# Patient Record
Sex: FEMALE | Race: WHITE | Employment: OTHER | URBAN - METROPOLITAN AREA
[De-identification: names, ages, dates, MRNs, and addresses within clinical notes are randomized per-mention and may not be internally consistent; named-entity substitution may affect disease eponyms.]

---

## 2017-01-30 RX ORDER — TRAMADOL HYDROCHLORIDE 50 MG/1
TABLET ORAL
Qty: 50 TABLET | Refills: 0 | Status: SHIPPED | OUTPATIENT
Start: 2017-01-30 | End: 2017-02-27 | Stop reason: SDUPTHER

## 2017-03-09 RX ORDER — TRAMADOL HYDROCHLORIDE 50 MG/1
TABLET ORAL
Qty: 50 TABLET | Refills: 0 | Status: SHIPPED | OUTPATIENT
Start: 2017-03-09 | End: 2017-03-28 | Stop reason: SDUPTHER

## 2017-04-03 DIAGNOSIS — T40.2X5A CONSTIPATION DUE TO OPIOID THERAPY: ICD-10-CM

## 2017-04-03 DIAGNOSIS — K59.03 CONSTIPATION DUE TO OPIOID THERAPY: ICD-10-CM

## 2017-04-03 RX ORDER — LUBIPROSTONE 8 UG/1
8 CAPSULE, GELATIN COATED ORAL DAILY
Qty: 30 CAPSULE | Refills: 3 | Status: SHIPPED | OUTPATIENT
Start: 2017-04-03 | End: 2017-08-23 | Stop reason: SDUPTHER

## 2017-04-21 ENCOUNTER — HOSPITAL ENCOUNTER (OUTPATIENT)
Dept: LAB | Age: 82
Discharge: HOME | End: 2017-04-21
Payer: COMMERCIAL

## 2017-04-21 DIAGNOSIS — E78.5 DYSLIPIDEMIA: ICD-10-CM

## 2017-04-21 DIAGNOSIS — I10 ESSENTIAL HYPERTENSION: ICD-10-CM

## 2017-04-21 DIAGNOSIS — E55.9 VITAMIN D DEFICIENCY: ICD-10-CM

## 2017-04-21 LAB
ALBUMIN SERPL-MCNC: 4.4 G/DL (ref 3.9–4.9)
ALP BLD-CCNC: 94 U/L (ref 40–130)
ALT SERPL-CCNC: 14 U/L (ref 0–33)
ANION GAP SERPL CALCULATED.3IONS-SCNC: 13 MEQ/L (ref 7–13)
AST SERPL-CCNC: 23 U/L (ref 0–35)
BASOPHILS ABSOLUTE: 0 K/UL (ref 0–0.2)
BASOPHILS RELATIVE PERCENT: 0.6 %
BILIRUB SERPL-MCNC: 0.5 MG/DL (ref 0–1.2)
BUN BLDV-MCNC: 15 MG/DL (ref 8–23)
CALCIUM SERPL-MCNC: 10.3 MG/DL (ref 8.6–10.2)
CHLORIDE BLD-SCNC: 97 MEQ/L (ref 98–107)
CHOLESTEROL, TOTAL: 179 MG/DL (ref 0–199)
CO2: 23 MEQ/L (ref 22–29)
CREAT SERPL-MCNC: 0.63 MG/DL (ref 0.5–0.9)
EOSINOPHILS ABSOLUTE: 0.3 K/UL (ref 0–0.7)
EOSINOPHILS RELATIVE PERCENT: 3.7 %
GFR AFRICAN AMERICAN: >60
GFR NON-AFRICAN AMERICAN: >60
GLOBULIN: 2.8 G/DL (ref 2.3–3.5)
GLUCOSE BLD-MCNC: 92 MG/DL (ref 74–109)
HCT VFR BLD CALC: 41.9 % (ref 37–47)
HDLC SERPL-MCNC: 70 MG/DL (ref 40–59)
HEMOGLOBIN: 14.2 G/DL (ref 12–16)
LDL CHOLESTEROL CALCULATED: 84 MG/DL (ref 0–129)
LYMPHOCYTES ABSOLUTE: 2 K/UL (ref 1–4.8)
LYMPHOCYTES RELATIVE PERCENT: 25.7 %
MCH RBC QN AUTO: 32 PG (ref 27–31.3)
MCHC RBC AUTO-ENTMCNC: 33.8 % (ref 33–37)
MCV RBC AUTO: 94.7 FL (ref 82–100)
MONOCYTES ABSOLUTE: 0.8 K/UL (ref 0.2–0.8)
MONOCYTES RELATIVE PERCENT: 10.4 %
NEUTROPHILS ABSOLUTE: 4.7 K/UL (ref 1.4–6.5)
NEUTROPHILS RELATIVE PERCENT: 59.6 %
PDW BLD-RTO: 13.7 % (ref 11.5–14.5)
PLATELET # BLD: 209 K/UL (ref 130–400)
POTASSIUM SERPL-SCNC: 4.5 MEQ/L (ref 3.5–5.1)
RBC # BLD: 4.43 M/UL (ref 4.2–5.4)
SODIUM BLD-SCNC: 133 MEQ/L (ref 132–144)
TOTAL PROTEIN: 7.2 G/DL (ref 6.4–8.1)
TRIGL SERPL-MCNC: 126 MG/DL (ref 0–200)
VITAMIN D 25-HYDROXY: 40.4 NG/ML (ref 30–100)
WBC # BLD: 7.9 K/UL (ref 4.8–10.8)

## 2017-04-21 PROCEDURE — 80061 LIPID PANEL: CPT

## 2017-04-21 PROCEDURE — 80053 COMPREHEN METABOLIC PANEL: CPT

## 2017-04-21 PROCEDURE — 82306 VITAMIN D 25 HYDROXY: CPT

## 2017-04-21 PROCEDURE — 85025 COMPLETE CBC W/AUTO DIFF WBC: CPT

## 2017-04-21 PROCEDURE — 36415 COLL VENOUS BLD VENIPUNCTURE: CPT

## 2017-05-11 RX ORDER — TRAMADOL HYDROCHLORIDE 50 MG/1
TABLET ORAL
Qty: 50 TABLET | Refills: 0 | OUTPATIENT
Start: 2017-05-11

## 2017-06-12 ENCOUNTER — TELEPHONE (OUTPATIENT)
Dept: FAMILY MEDICINE CLINIC | Age: 82
End: 2017-06-12

## 2017-06-12 ENCOUNTER — OFFICE VISIT (OUTPATIENT)
Dept: FAMILY MEDICINE CLINIC | Age: 82
End: 2017-06-12

## 2017-06-12 VITALS
TEMPERATURE: 98.6 F | BODY MASS INDEX: 24.03 KG/M2 | RESPIRATION RATE: 16 BRPM | HEIGHT: 57 IN | SYSTOLIC BLOOD PRESSURE: 176 MMHG | HEART RATE: 86 BPM | DIASTOLIC BLOOD PRESSURE: 82 MMHG | OXYGEN SATURATION: 97 % | WEIGHT: 111.4 LBS

## 2017-06-12 DIAGNOSIS — E55.9 VITAMIN D DEFICIENCY: Chronic | ICD-10-CM

## 2017-06-12 DIAGNOSIS — E78.5 DYSLIPIDEMIA: Chronic | ICD-10-CM

## 2017-06-12 DIAGNOSIS — K59.09 CHRONIC CONSTIPATION: Chronic | ICD-10-CM

## 2017-06-12 DIAGNOSIS — R09.89 BRUIT OF LEFT CAROTID ARTERY: ICD-10-CM

## 2017-06-12 DIAGNOSIS — Z96.641 STATUS POST TOTAL REPLACEMENT OF RIGHT HIP: Chronic | ICD-10-CM

## 2017-06-12 DIAGNOSIS — I83.93 VARICOSE VEINS OF BOTH LOWER EXTREMITIES: ICD-10-CM

## 2017-06-12 DIAGNOSIS — M06.00 SERONEGATIVE RHEUMATOID ARTHRITIS (HCC): Primary | ICD-10-CM

## 2017-06-12 DIAGNOSIS — I10 ESSENTIAL HYPERTENSION: Chronic | ICD-10-CM

## 2017-06-12 DIAGNOSIS — I48.91 ATRIAL FIBRILLATION, UNSPECIFIED TYPE (HCC): ICD-10-CM

## 2017-06-12 PROCEDURE — 99214 OFFICE O/P EST MOD 30 MIN: CPT | Performed by: FAMILY MEDICINE

## 2017-06-12 ASSESSMENT — PATIENT HEALTH QUESTIONNAIRE - PHQ9
2. FEELING DOWN, DEPRESSED OR HOPELESS: 0
SUM OF ALL RESPONSES TO PHQ9 QUESTIONS 1 & 2: 0
SUM OF ALL RESPONSES TO PHQ QUESTIONS 1-9: 0
1. LITTLE INTEREST OR PLEASURE IN DOING THINGS: 0

## 2017-06-12 ASSESSMENT — ENCOUNTER SYMPTOMS
SHORTNESS OF BREATH: 0
CONSTIPATION: 1
NAUSEA: 0
COUGH: 0
VOMITING: 0
BLOOD IN STOOL: 0
WHEEZING: 0
CHEST TIGHTNESS: 0
ABDOMINAL PAIN: 0
ANAL BLEEDING: 0
BACK PAIN: 0
DIARRHEA: 0

## 2017-06-27 ENCOUNTER — TELEPHONE (OUTPATIENT)
Dept: FAMILY MEDICINE CLINIC | Age: 82
End: 2017-06-27

## 2017-07-10 ENCOUNTER — NURSE ONLY (OUTPATIENT)
Dept: FAMILY MEDICINE CLINIC | Age: 82
End: 2017-07-10

## 2017-07-10 ENCOUNTER — TELEPHONE (OUTPATIENT)
Dept: FAMILY MEDICINE CLINIC | Age: 82
End: 2017-07-10

## 2017-07-10 VITALS — DIASTOLIC BLOOD PRESSURE: 64 MMHG | HEART RATE: 70 BPM | SYSTOLIC BLOOD PRESSURE: 100 MMHG

## 2017-07-10 DIAGNOSIS — Z01.30 BLOOD PRESSURE CHECK: Primary | ICD-10-CM

## 2017-07-26 RX ORDER — DILTIAZEM HYDROCHLORIDE 60 MG/1
TABLET, FILM COATED ORAL
Qty: 90 TABLET | Refills: 5 | Status: ON HOLD | OUTPATIENT
Start: 2017-07-26 | End: 2018-01-14 | Stop reason: HOSPADM

## 2017-07-26 RX ORDER — METOPROLOL SUCCINATE 25 MG/1
TABLET, EXTENDED RELEASE ORAL
Qty: 30 TABLET | Refills: 5 | Status: SHIPPED | OUTPATIENT
Start: 2017-07-26 | End: 2018-01-24 | Stop reason: SDUPTHER

## 2017-07-26 RX ORDER — ATORVASTATIN CALCIUM 20 MG/1
TABLET, FILM COATED ORAL
Qty: 30 TABLET | Refills: 5 | Status: SHIPPED | OUTPATIENT
Start: 2017-07-26 | End: 2017-10-27 | Stop reason: ALTCHOICE

## 2017-08-23 DIAGNOSIS — K59.03 CONSTIPATION DUE TO OPIOID THERAPY: ICD-10-CM

## 2017-08-23 DIAGNOSIS — T40.2X5A CONSTIPATION DUE TO OPIOID THERAPY: ICD-10-CM

## 2017-08-24 RX ORDER — LUBIPROSTONE 8 UG/1
8 CAPSULE, GELATIN COATED ORAL DAILY
Qty: 30 CAPSULE | Refills: 3 | Status: SHIPPED | OUTPATIENT
Start: 2017-08-24 | End: 2017-11-28 | Stop reason: SDUPTHER

## 2017-10-11 DIAGNOSIS — B36.9 FUNGAL DERMATITIS: ICD-10-CM

## 2017-10-11 RX ORDER — NYSTATIN 100000 U/G
CREAM TOPICAL
Qty: 30 G | OUTPATIENT
Start: 2017-10-11

## 2017-10-11 RX ORDER — NYSTATIN 100000 U/G
CREAM TOPICAL
Qty: 30 G | Refills: 0 | Status: ON HOLD | OUTPATIENT
Start: 2017-10-11 | End: 2018-01-14 | Stop reason: HOSPADM

## 2017-10-11 NOTE — TELEPHONE ENCOUNTER
lst seen 6/12/2017. Has follow up scheduled 12/12/2017. Medication is pending if okay. Please advise.

## 2017-10-17 ENCOUNTER — APPOINTMENT (OUTPATIENT)
Dept: CT IMAGING | Age: 82
End: 2017-10-17
Payer: COMMERCIAL

## 2017-10-17 ENCOUNTER — APPOINTMENT (OUTPATIENT)
Dept: GENERAL RADIOLOGY | Age: 82
End: 2017-10-17
Payer: COMMERCIAL

## 2017-10-17 ENCOUNTER — HOSPITAL ENCOUNTER (EMERGENCY)
Age: 82
Discharge: ANOTHER HOSPITAL | End: 2017-10-17
Attending: EMERGENCY MEDICINE
Payer: COMMERCIAL

## 2017-10-17 VITALS
RESPIRATION RATE: 18 BRPM | HEART RATE: 63 BPM | SYSTOLIC BLOOD PRESSURE: 160 MMHG | DIASTOLIC BLOOD PRESSURE: 69 MMHG | TEMPERATURE: 97.9 F | WEIGHT: 110 LBS | BODY MASS INDEX: 21.6 KG/M2 | OXYGEN SATURATION: 97 % | HEIGHT: 60 IN

## 2017-10-17 DIAGNOSIS — H34.9 RETINAL ARTERY OCCLUSION: Primary | ICD-10-CM

## 2017-10-17 LAB
ALBUMIN SERPL-MCNC: 4.4 G/DL (ref 3.9–4.9)
ALP BLD-CCNC: 93 U/L (ref 40–130)
ALT SERPL-CCNC: 13 U/L (ref 0–33)
ANION GAP SERPL CALCULATED.3IONS-SCNC: 14 MEQ/L (ref 7–13)
APTT: 21 SEC (ref 21.6–35.4)
AST SERPL-CCNC: 21 U/L (ref 0–35)
BASOPHILS ABSOLUTE: 0 K/UL (ref 0–0.2)
BASOPHILS RELATIVE PERCENT: 0.6 %
BILIRUB SERPL-MCNC: 0.5 MG/DL (ref 0–1.2)
BUN BLDV-MCNC: 16 MG/DL (ref 8–23)
C-REACTIVE PROTEIN: <0.3 MG/L (ref 0–5)
CALCIUM SERPL-MCNC: 10.1 MG/DL (ref 8.6–10.2)
CHLORIDE BLD-SCNC: 99 MEQ/L (ref 98–107)
CO2: 26 MEQ/L (ref 22–29)
CREAT SERPL-MCNC: 0.62 MG/DL (ref 0.5–0.9)
EKG ATRIAL RATE: 58 BPM
EKG P AXIS: 86 DEGREES
EKG P-R INTERVAL: 182 MS
EKG Q-T INTERVAL: 454 MS
EKG QRS DURATION: 108 MS
EKG QTC CALCULATION (BAZETT): 445 MS
EKG R AXIS: -50 DEGREES
EKG T AXIS: -12 DEGREES
EKG VENTRICULAR RATE: 58 BPM
EOSINOPHILS ABSOLUTE: 0.4 K/UL (ref 0–0.7)
EOSINOPHILS RELATIVE PERCENT: 4.4 %
GFR AFRICAN AMERICAN: >60
GFR NON-AFRICAN AMERICAN: >60
GLOBULIN: 3 G/DL (ref 2.3–3.5)
GLUCOSE BLD-MCNC: 113 MG/DL (ref 74–109)
HCT VFR BLD CALC: 43.3 % (ref 37–47)
HEMOGLOBIN: 14.7 G/DL (ref 12–16)
INR BLD: 1
LYMPHOCYTES ABSOLUTE: 1.5 K/UL (ref 1–4.8)
LYMPHOCYTES RELATIVE PERCENT: 19.4 %
MCH RBC QN AUTO: 32.4 PG (ref 27–31.3)
MCHC RBC AUTO-ENTMCNC: 33.9 % (ref 33–37)
MCV RBC AUTO: 95.5 FL (ref 82–100)
MONOCYTES ABSOLUTE: 0.7 K/UL (ref 0.2–0.8)
MONOCYTES RELATIVE PERCENT: 8.8 %
NEUTROPHILS ABSOLUTE: 5.3 K/UL (ref 1.4–6.5)
NEUTROPHILS RELATIVE PERCENT: 66.8 %
PDW BLD-RTO: 13.4 % (ref 11.5–14.5)
PLATELET # BLD: 221 K/UL (ref 130–400)
POTASSIUM SERPL-SCNC: 4.3 MEQ/L (ref 3.5–5.1)
PROTHROMBIN TIME: 9.8 SEC (ref 9.6–12.3)
RBC # BLD: 4.53 M/UL (ref 4.2–5.4)
SEDIMENTATION RATE, ERYTHROCYTE: 12 MM (ref 0–30)
SODIUM BLD-SCNC: 139 MEQ/L (ref 132–144)
TOTAL PROTEIN: 7.4 G/DL (ref 6.4–8.1)
TROPONIN: <0.01 NG/ML (ref 0–0.01)
WBC # BLD: 8 K/UL (ref 4.8–10.8)

## 2017-10-17 PROCEDURE — 93005 ELECTROCARDIOGRAM TRACING: CPT

## 2017-10-17 PROCEDURE — 85025 COMPLETE CBC W/AUTO DIFF WBC: CPT

## 2017-10-17 PROCEDURE — 70450 CT HEAD/BRAIN W/O DYE: CPT

## 2017-10-17 PROCEDURE — 96374 THER/PROPH/DIAG INJ IV PUSH: CPT

## 2017-10-17 PROCEDURE — 99285 EMERGENCY DEPT VISIT HI MDM: CPT

## 2017-10-17 PROCEDURE — 85610 PROTHROMBIN TIME: CPT

## 2017-10-17 PROCEDURE — 71010 XR CHEST PORTABLE: CPT

## 2017-10-17 PROCEDURE — 80053 COMPREHEN METABOLIC PANEL: CPT

## 2017-10-17 PROCEDURE — 85730 THROMBOPLASTIN TIME PARTIAL: CPT

## 2017-10-17 PROCEDURE — 86140 C-REACTIVE PROTEIN: CPT

## 2017-10-17 PROCEDURE — 84484 ASSAY OF TROPONIN QUANT: CPT

## 2017-10-17 PROCEDURE — 6370000000 HC RX 637 (ALT 250 FOR IP): Performed by: EMERGENCY MEDICINE

## 2017-10-17 PROCEDURE — 85652 RBC SED RATE AUTOMATED: CPT

## 2017-10-17 PROCEDURE — 6360000002 HC RX W HCPCS: Performed by: EMERGENCY MEDICINE

## 2017-10-17 RX ORDER — HYDRALAZINE HYDROCHLORIDE 20 MG/ML
10 INJECTION INTRAMUSCULAR; INTRAVENOUS ONCE
Status: COMPLETED | OUTPATIENT
Start: 2017-10-17 | End: 2017-10-17

## 2017-10-17 RX ORDER — CYCLOPENTOLATE HYDROCHLORIDE 10 MG/ML
1 SOLUTION/ DROPS OPHTHALMIC ONCE
Status: COMPLETED | OUTPATIENT
Start: 2017-10-17 | End: 2017-10-17

## 2017-10-17 RX ADMIN — HYDRALAZINE HYDROCHLORIDE 10 MG: 20 INJECTION INTRAMUSCULAR; INTRAVENOUS at 02:43

## 2017-10-17 RX ADMIN — CYCLOPENTOLATE HYDROCHLORIDE 1 DROP: 10 SOLUTION/ DROPS OPHTHALMIC at 00:57

## 2017-10-17 ASSESSMENT — ENCOUNTER SYMPTOMS
SHORTNESS OF BREATH: 0
ABDOMINAL PAIN: 0
COLOR CHANGE: 0
BLOOD IN STOOL: 0
EYE REDNESS: 0
RHINORRHEA: 0
EYE PAIN: 0
VOMITING: 0
COUGH: 0

## 2017-10-17 NOTE — ED NOTES
Dr. Gonzalez Falldominik from [facility] on phone with Dr. Octavia Reyes at this time.      Zuri Grider RN  10/17/17 [id number]

## 2017-10-17 NOTE — ED NOTES
Neuro check remains the same as earlier with no deficits except the vision in her right eye ( unable to see).      Shannon Westbrook RN  10/17/17 8548

## 2017-10-17 NOTE — ED PROVIDER NOTES
SOCIAL HISTORY       Social History     Social History    Marital status:      Spouse name: N/A    Number of children: N/A    Years of education: N/A     Social History Main Topics    Smoking status: Never Smoker    Smokeless tobacco: Never Used    Alcohol use Yes      Comment: rarely    Drug use: No    Sexual activity: No     Other Topics Concern    None     Social History Narrative    None       SCREENINGS    [place] Coma Scale  Eye Opening: Spontaneous  Best Verbal Response: Oriented  Best Motor Response: Obeys commands  [place] Coma Scale Score: 15        PHYSICAL EXAM    (up to 7 for level 4, 8 or more for level 5)     ED Triage Vitals   BP Temp Temp Source Pulse Resp SpO2 Height Weight   10/17/17 0032 10/17/17 0032 10/17/17 0032 10/17/17 0032 10/17/17 0029 10/17/17 0032 10/17/17 0032 10/17/17 0032   (!) 210/85 97.9 °F (36.6 °C) Oral 70 18 97 % 5' (1.524 m) 110 lb (49.9 kg)       Physical Exam   Constitutional: She is oriented to person, place, and time. She appears well-developed and well-nourished. HENT:   Head: Normocephalic and atraumatic. Eyes: Conjunctivae, EOM and lids are normal. Right eye exhibits no chemosis, no discharge and no exudate. No foreign body present in the right eye. Left eye exhibits no chemosis, no discharge and no exudate. No foreign body present in the left eye. Right conjunctiva is not injected. Left conjunctiva is not injected. Right eye exhibits normal extraocular motion and no nystagmus. Left eye exhibits normal extraocular motion and no nystagmus. Right pupil is not reactive. Right pupil is round. Left pupil is round. Pupils are equal.       Neck: Normal range of motion. Neck supple. Pulmonary/Chest: Effort normal. No respiratory distress. Musculoskeletal: Normal range of motion. She exhibits no deformity. Neurological: She is alert and oriented to person, place, and time. Psychiatric: She has a normal mood and affect.  Her behavior is normal. DIAGNOSTIC RESULTS     EKG: All EKG's are interpreted by the Emergency Department Physician who either signs or Co-signs this chart in the absence of a cardiologist.    EKG shows sinus bradycardia with a rate of 58 there are 2 premature atrial contractions. QRS duration is 108 ms. There is LVH and nonspecific ST-T segment changes. RADIOLOGY:   Non-plain film images such as CT, Ultrasound and MRI are read by the radiologist. Plain radiographic images are visualized and preliminarily interpreted by the emergency physician with the below findings:    CAT scan of the brain is read as no acute disease. Chest x-ray shows no acute disease. Interpretation per the Radiologist below, if available at the time of this note:    CT Head WO Contrast    (Results Pending)   XR Chest Portable    (Results Pending)         ED BEDSIDE ULTRASOUND:   Performed by ED Physician - none    LABS:  Labs Reviewed   CBC WITH AUTO DIFFERENTIAL - Abnormal; Notable for the following:        Result Value    MCH 32.4 (*)     All other components within normal limits   COMPREHENSIVE METABOLIC PANEL - Abnormal; Notable for the following: Anion Gap 14 (*)     Glucose 113 (*)     All other components within normal limits   APTT - Abnormal; Notable for the following:     aPTT 21.0 (*)     All other components within normal limits   TROPONIN   PROTIME-INR   SEDIMENTATION RATE   C-REACTIVE PROTEIN       All other labs were within normal range or not returned as of this dictation. EMERGENCY DEPARTMENT COURSE and DIFFERENTIAL DIAGNOSIS/MDM:   Vitals:    Vitals:    10/17/17 0029 10/17/17 0032 10/17/17 0152 10/17/17 0245   BP:  (!) 210/85 (!) 193/85 (!) 185/80   Pulse:  70 63 65   Resp: 18  18 20   Temp:  97.9 °F (36.6 °C)     TempSrc:  Oral     SpO2:  97% 98% 96%   Weight:  110 lb (49.9 kg)     Height:  5' (1.524 m)         I have concerned this patient has a retinal artery occlusion on the right side.   His such she will need to be

## 2017-10-17 NOTE — ED NOTES
Swallowing evaluation done and pt passed the test. No gurgling or coughing noted throughout and pt's voice was clear     Gwen Guevara RN  10/17/17 9382

## 2017-10-17 NOTE — ED NOTES
Dr. Eddie Wong accepts patient to [facility]. Awaiting bed assignment at this time.      Denise Sher RN  10/17/17 8857

## 2017-10-20 ENCOUNTER — TELEPHONE (OUTPATIENT)
Dept: FAMILY MEDICINE CLINIC | Age: 82
End: 2017-10-20

## 2017-10-26 ENCOUNTER — TELEPHONE (OUTPATIENT)
Dept: FAMILY MEDICINE CLINIC | Age: 82
End: 2017-10-26

## 2017-10-27 ENCOUNTER — OFFICE VISIT (OUTPATIENT)
Dept: FAMILY MEDICINE CLINIC | Age: 82
End: 2017-10-27

## 2017-10-27 VITALS
BODY MASS INDEX: 23.73 KG/M2 | DIASTOLIC BLOOD PRESSURE: 64 MMHG | SYSTOLIC BLOOD PRESSURE: 126 MMHG | WEIGHT: 110 LBS | RESPIRATION RATE: 12 BRPM | HEART RATE: 82 BPM | TEMPERATURE: 97.7 F | HEIGHT: 57 IN | OXYGEN SATURATION: 98 %

## 2017-10-27 DIAGNOSIS — E78.5 DYSLIPIDEMIA: ICD-10-CM

## 2017-10-27 DIAGNOSIS — H34.11 CENTRAL RETINAL ARTERY OCCLUSION, RIGHT EYE: Primary | ICD-10-CM

## 2017-10-27 DIAGNOSIS — H54.61 VISION LOSS OF RIGHT EYE: ICD-10-CM

## 2017-10-27 DIAGNOSIS — Z23 NEED FOR VACCINATION: ICD-10-CM

## 2017-10-27 DIAGNOSIS — M06.00 SERONEGATIVE RHEUMATOID ARTHRITIS (HCC): Chronic | ICD-10-CM

## 2017-10-27 DIAGNOSIS — I10 ESSENTIAL HYPERTENSION: ICD-10-CM

## 2017-10-27 DIAGNOSIS — H91.91 HEARING LOSS OF RIGHT EAR, UNSPECIFIED HEARING LOSS TYPE: ICD-10-CM

## 2017-10-27 DIAGNOSIS — I65.23 BILATERAL CAROTID ARTERY STENOSIS: Chronic | ICD-10-CM

## 2017-10-27 DIAGNOSIS — I48.0 PAROXYSMAL ATRIAL FIBRILLATION (HCC): ICD-10-CM

## 2017-10-27 PROCEDURE — G8484 FLU IMMUNIZE NO ADMIN: HCPCS | Performed by: FAMILY MEDICINE

## 2017-10-27 PROCEDURE — 1123F ACP DISCUSS/DSCN MKR DOCD: CPT | Performed by: FAMILY MEDICINE

## 2017-10-27 PROCEDURE — 90662 IIV NO PRSV INCREASED AG IM: CPT | Performed by: FAMILY MEDICINE

## 2017-10-27 PROCEDURE — 1090F PRES/ABSN URINE INCON ASSESS: CPT | Performed by: FAMILY MEDICINE

## 2017-10-27 PROCEDURE — G8420 CALC BMI NORM PARAMETERS: HCPCS | Performed by: FAMILY MEDICINE

## 2017-10-27 PROCEDURE — 99214 OFFICE O/P EST MOD 30 MIN: CPT | Performed by: FAMILY MEDICINE

## 2017-10-27 PROCEDURE — G8598 ASA/ANTIPLAT THER USED: HCPCS | Performed by: FAMILY MEDICINE

## 2017-10-27 PROCEDURE — G0008 ADMIN INFLUENZA VIRUS VAC: HCPCS | Performed by: FAMILY MEDICINE

## 2017-10-27 PROCEDURE — G8427 DOCREV CUR MEDS BY ELIG CLIN: HCPCS | Performed by: FAMILY MEDICINE

## 2017-10-27 PROCEDURE — 90670 PCV13 VACCINE IM: CPT | Performed by: FAMILY MEDICINE

## 2017-10-27 PROCEDURE — 4040F PNEUMOC VAC/ADMIN/RCVD: CPT | Performed by: FAMILY MEDICINE

## 2017-10-27 PROCEDURE — 1036F TOBACCO NON-USER: CPT | Performed by: FAMILY MEDICINE

## 2017-10-27 PROCEDURE — G0009 ADMIN PNEUMOCOCCAL VACCINE: HCPCS | Performed by: FAMILY MEDICINE

## 2017-10-27 RX ORDER — LOSARTAN POTASSIUM 50 MG/1
50 TABLET ORAL DAILY
COMMUNITY
Start: 2017-10-20 | End: 2017-12-12 | Stop reason: SDUPTHER

## 2017-10-27 RX ORDER — ASPIRIN 81 MG/1
81 TABLET, CHEWABLE ORAL DAILY
COMMUNITY
Start: 2017-10-20 | End: 2017-11-08 | Stop reason: ALTCHOICE

## 2017-10-27 RX ORDER — ATORVASTATIN CALCIUM 40 MG/1
40 TABLET, FILM COATED ORAL
COMMUNITY
Start: 2017-10-20 | End: 2017-12-12 | Stop reason: SDUPTHER

## 2017-10-27 ASSESSMENT — PATIENT HEALTH QUESTIONNAIRE - PHQ9
SUM OF ALL RESPONSES TO PHQ9 QUESTIONS 1 & 2: 0
1. LITTLE INTEREST OR PLEASURE IN DOING THINGS: 0
SUM OF ALL RESPONSES TO PHQ QUESTIONS 1-9: 0
2. FEELING DOWN, DEPRESSED OR HOPELESS: 0

## 2017-10-27 ASSESSMENT — ENCOUNTER SYMPTOMS
PHOTOPHOBIA: 0
EYE REDNESS: 0
CHEST TIGHTNESS: 0
NAUSEA: 0
ABDOMINAL PAIN: 0
BLOOD IN STOOL: 0
WHEEZING: 0
EYE ITCHING: 0
SHORTNESS OF BREATH: 0
EYE DISCHARGE: 0
CONSTIPATION: 0
DIARRHEA: 0
VOMITING: 0
COUGH: 0
EYE PAIN: 0

## 2017-10-27 NOTE — PROGRESS NOTES
Subjective:      Patient ID: Masha Ramires is a 80 y.o. female who presents today for:  Chief Complaint   Patient presents with    Follow-Up from Hospital     pt is following up from [facility] and [address]       HPI     Patient here for hospital F/U visit. She presented initially to [facility] on 10/17/2017 for sudden loss of vision to the right eye. After examination she was transferred to [facility] for further acute management. 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 Aspirin was added to her regimen and her dose of atorvastatin was increased to 40 mg. Losartan 50 mg was added for more aggressive blood pressure control. She underwent CTA of the head which showed decreased flow in the V3 segment of right vertebral artery suggesting stenosis vs. diminutive caliber - otherwise normal intracranial CTA. Carotid ultrasound was done which showed 20-39% stenosis to bilateral carotids. She was found stable for discharge home on 10/20/2017 to follow up with ophthalmology as an outpatient. Pt reports being seen by PT/OT since hospital discharge for an initial evaluation. Per son no further recommendations were made by PT, OT did potentially recommend continued help with upper extremity mobility, however, son states he declined any further intervention - Darien Potter is now what she is going to be and she gets around fine. \"     She reports continued complete loss of vision from the right eye without any reported pain or drainage. She denies any change to left eye vision, denies left eye pain. No reported H/A or dizziness. No N/V. No reported falls. She has visit scheduled with optho scheduled in the next week for hospital F/U. Son requests referral to ENT for noted worsening hearing loss over time.      Past Medical History:   Diagnosis Date    Anxiety     Atrial fibrillation ([address]     Bilateral carotid Social History Main Topics    Smoking status: Never Smoker    Smokeless tobacco: Never Used    Alcohol use Yes      Comment: rarely    Drug use: No    Sexual activity: No     Other Topics Concern    Not on file     Social History Narrative    No narrative on file     Current Outpatient Prescriptions on File Prior to Visit   Medication Sig Dispense Refill    nystatin (MYCOSTATIN) 855259 UNIT/GM cream Apply topically 2 times daily. 30 g 0    fluocinonide (LIDEX) 0.05 % cream Apply topically 2 times daily as needed (itching) 15 g 3    lubiprostone (AMITIZA) 8 MCG CAPS capsule Take 1 capsule by mouth daily 30 capsule 3    diltiazem (CARDIZEM) 60 MG tablet TAKE ONE (1) TABLET BY MOUTH THREE TIMES A DAY 90 tablet 5    metoprolol succinate (TOPROL XL) 25 MG extended release tablet TAKE 1 TABLET BY MOUTH ONCE DAILY 30 tablet 5    omeprazole (PRILOSEC) 20 MG delayed release capsule TAKE ONE (1) CAPSULE BY MOUTH ONCE DAILY 30 capsule 10    fluticasone (FLONASE) 50 MCG/ACT nasal spray USE 1 SPRAY IN EACH NOSTRIL ONCE DAILY 16 g 10    traMADol (ULTRAM) 50 MG tablet TAKE 1/2 TABLET BY MOUTH EVERY 4 TO 6 HOURS AS NEEDED FOR PAIN 50 tablet 0    Lactobacillus (PROBIOTIC ACIDOPHILUS) TABS Take 1 tablet by mouth 2 times daily 60 tablet 3    Calcium Carbonate-Vit D-Min (GNP CALCIUM PLUS 600 +D PO) Take  by mouth daily.  Multiple Vitamin (MULTIVITAMIN PO) Take  by mouth daily.  [DISCONTINUED] diltiazem (CARDIZEM CD) 180 MG ER capsule Take 1 capsule by mouth daily. 30 capsule 1     No current facility-administered medications on file prior to visit. Allergies:  Cephalexin; Cipro xr; Doxycycline; Meprobamate; Penicillins; Relafen [nabumetone]; and Nsaids    Review of Systems   Constitutional: Negative for appetite change, chills, diaphoresis, fatigue and fever. Eyes: Positive for visual disturbance (right eye vision loss). Negative for photophobia, pain, discharge, redness and itching. glucose, lipid, and blood pressure control long term. 7. Seronegative rheumatoid arthritis ([address]  Reviewed again with patient and son at length the risk of future handicap and disability due to degeneration of the joints, particularly of the hands which would make it difficult for her to use her normal crit and ambulate with her walker. Patient and son finally agree to establish care with rheumatology, and new referral to rheumatologist to Coumadin clinic was given per their request to have something closer to the [facility] area  - Amb External Referral To Rheumatology    8. Hearing loss of right ear, unspecified hearing loss type  Patient referred to ENT for further evaluation of hearing loss and formal hearing testing.    - Amb External Referral To ENT    9. Need for vaccination  Influenza vaccination given today in the office and Prevnar vaccination given today in the office. Printed order for Tdap vaccination also given for this to be done at the pharmacy. - Pneumococcal conjugate vaccine 13-valent  - INFLUENZA, HIGH DOSE, 65 YRS +, IM, PF, PREFILL SYR, 0.5ML (FLUZONE HD)  - Tetanus-Diphth-Acell Pertussis (BOOSTRIX) 5-2.5-18.5 LF-MCG/0.5 injection; Inject 0.5 mLs into the muscle once for 1 dose  Dispense: 0.5 mL; Refill: 0      Modified Medications    No medications on file       New Prescriptions    TETANUS-DIPHTH-ACELL PERTUSSIS ([address]) 5-2.5-18.5 LF-MCG/0.5 INJECTION    Inject 0.5 mLs into the muscle once for 1 dose       Medications Discontinued During This Encounter   Medication Reason    atorvastatin (LIPITOR) 20 MG tablet Therapy completed    nystatin-triamcinolone (MYCOLOG II) 310910-6.1 UNIT/GM-% cream Duplicate Order       Return for Chronic Disease Check in 3-4 months.     Phoenix Carias MD

## 2017-11-06 ENCOUNTER — TELEPHONE (OUTPATIENT)
Dept: FAMILY MEDICINE CLINIC | Age: 82
End: 2017-11-06

## 2017-11-06 NOTE — TELEPHONE ENCOUNTER
We received a fax from [address] [place] [address] about the Rheumatology referral for patient that she is requesting she be referred to a provider that is more local to her because she is unable to travel. I spoke with Dr. Gustavo Wall about this and he said that they had a specific conversation with her and her son at her last visit that Dr. Galdino Kinney was too far away and that they were agreeing to go to [facility] because they would possibly be able to refer them closer. After speaking to patients son today he was not aware of the locations offered by  so I called them and they told me based off of her DX the only referring Rheumatologist is at the main campus. I then searched for a Rheumatologist near [facility] area but, there are none. The only one close is Dr. Galdino Kinney in [facility]. Per Dr. Gustavo Wall call son and patient back and let them know this and re iterate with them that the patient will eventually not be able to do her day to day activity because of her Rheumatoid arthritis and will require 24 hour care. Son is aware and information to Dr. Galdino Kinney given to him so he can set up appointment. See scan in fax to this encounter.

## 2017-11-08 ENCOUNTER — OFFICE VISIT (OUTPATIENT)
Dept: CARDIOLOGY | Age: 82
End: 2017-11-08

## 2017-11-08 VITALS
OXYGEN SATURATION: 97 % | HEART RATE: 105 BPM | SYSTOLIC BLOOD PRESSURE: 124 MMHG | RESPIRATION RATE: 14 BRPM | BODY MASS INDEX: 24.32 KG/M2 | WEIGHT: 112.4 LBS | DIASTOLIC BLOOD PRESSURE: 78 MMHG

## 2017-11-08 DIAGNOSIS — I48.0 PAROXYSMAL ATRIAL FIBRILLATION (HCC): ICD-10-CM

## 2017-11-08 DIAGNOSIS — I65.23 BILATERAL CAROTID ARTERY STENOSIS: Primary | ICD-10-CM

## 2017-11-08 DIAGNOSIS — R60.9 PERIPHERAL EDEMA: ICD-10-CM

## 2017-11-08 DIAGNOSIS — R09.89 BRUIT OF LEFT CAROTID ARTERY: ICD-10-CM

## 2017-11-08 DIAGNOSIS — I10 ESSENTIAL HYPERTENSION: ICD-10-CM

## 2017-11-08 PROCEDURE — G8484 FLU IMMUNIZE NO ADMIN: HCPCS | Performed by: INTERNAL MEDICINE

## 2017-11-08 PROCEDURE — G8420 CALC BMI NORM PARAMETERS: HCPCS | Performed by: INTERNAL MEDICINE

## 2017-11-08 PROCEDURE — 99204 OFFICE O/P NEW MOD 45 MIN: CPT | Performed by: INTERNAL MEDICINE

## 2017-11-08 PROCEDURE — 4040F PNEUMOC VAC/ADMIN/RCVD: CPT | Performed by: INTERNAL MEDICINE

## 2017-11-08 PROCEDURE — 1090F PRES/ABSN URINE INCON ASSESS: CPT | Performed by: INTERNAL MEDICINE

## 2017-11-08 PROCEDURE — G8427 DOCREV CUR MEDS BY ELIG CLIN: HCPCS | Performed by: INTERNAL MEDICINE

## 2017-11-08 ASSESSMENT — ENCOUNTER SYMPTOMS
SHORTNESS OF BREATH: 0
CHEST TIGHTNESS: 0

## 2017-11-09 ENCOUNTER — TELEPHONE (OUTPATIENT)
Dept: FAMILY MEDICINE CLINIC | Age: 82
End: 2017-11-09

## 2017-11-09 ENCOUNTER — TELEPHONE (OUTPATIENT)
Dept: CARDIOLOGY | Age: 82
End: 2017-11-09

## 2017-11-09 NOTE — TELEPHONE ENCOUNTER
Patient is being seen for Skilled Nursing for Medication Education, Physical & Occupational Therapy and possibly with health aid for Hygiene.  Patient is on Cardizem 60MG and patient was prescribed Xarelto 15MG by cardiologist yesterday, please advise of interaction

## 2017-11-09 NOTE — TELEPHONE ENCOUNTER
PT'S [facility] RN, KAREN, [address] STARTED PT ON XARELTO 15 MG DAILY. PT STATES DR. Jorge Luis Stafford TOLD HER TO CONTINUE TAKING THE ASA WITH THE Teresa Schwartz. IS THIS CORRECT? Eben Lott -[phone].

## 2017-11-09 NOTE — TELEPHONE ENCOUNTER
What is being requested of me? I don't understand the message.   There is no specific interaction between Xarelto and  Cardizem and it was prescribed by the specialist.

## 2017-11-10 NOTE — TELEPHONE ENCOUNTER
Called and left detailed message. Asked for Robin Martins to give office a call back to let us know she did receive the message.

## 2017-11-10 NOTE — TELEPHONE ENCOUNTER
Spoke with Tammi Castaneda for clarification. She was calling to let you know two things:    1. She is set up for [address]  2. Their system pops up a warning with the patient being on both Cardizem and Xarelto. One can intensify the effects of the other. Its low warning that comes through  So they need to get a verbal ok from PCP saying you are aware and ok to continue to give both of these medications. Please advise. TO MA:  Tammi Castaneda says if she does not answer it is ok to leave a VM with information as the VM is secure.

## 2017-11-11 ASSESSMENT — ENCOUNTER SYMPTOMS
COLOR CHANGE: 0
APNEA: 0

## 2017-11-13 ENCOUNTER — TELEPHONE (OUTPATIENT)
Dept: FAMILY MEDICINE CLINIC | Age: 82
End: 2017-11-13

## 2017-11-14 ENCOUNTER — HOSPITAL ENCOUNTER (OUTPATIENT)
Dept: NON INVASIVE DIAGNOSTICS | Age: 82
Discharge: HOME | End: 2017-11-14
Payer: COMMERCIAL

## 2017-11-14 DIAGNOSIS — I48.0 PAROXYSMAL ATRIAL FIBRILLATION (HCC): ICD-10-CM

## 2017-11-14 PROCEDURE — 93226 XTRNL ECG REC<48 HR SCAN A/R: CPT

## 2017-11-17 NOTE — PROCEDURES
[address]                       [address], [address] [facility]                                  HOLTER MONITOR    PATIENT NAME: Sherley Toledo                   :        10/09/1926  MED REC NO:   [id number]                            ROOM:  ACCOUNT NO:   [de-identified]                           ADMIT DATE: 2017  PROVIDER:     Cm Savage MD    HOLTER MONITOR ___-HOURS    DATE OF STUDY:  2017    REFERRING PROVIDER:  Dr. Lynnette Galo. INDICATION FOR PROCEDURE:  Palpitation, irregular heart beat. The patient was monitored for 24-hours. Underlying electrocardiogram is  sinus rhythm. Average heart rate is 64 beats per minute. Minimum heart  rate is 40 beats occurring at 12:15 a.m. Maximum heart rate is 100 beats  per minute occurring at 8:31 a.m. representing sinus tachycardia. There  were rare ventricular ectopy and isolation. There were frequent atrial  ectopy. There were two short runs of atrial tachycardia, lasting 7 beats. There was no evidence of atrial fibrillation. No flutter. IMPRESSION:  1. Overall benign Holter monitoring. 2.  Frequent atrial ectopy.         Moises iRvera MD    D: 2017 17:15:11       T: 2017 23:44:10     [email or web address]  Job#: [id number]     Doc#: [id number]

## 2017-11-28 DIAGNOSIS — T40.2X5A CONSTIPATION DUE TO OPIOID THERAPY: ICD-10-CM

## 2017-11-28 DIAGNOSIS — K59.03 CONSTIPATION DUE TO OPIOID THERAPY: ICD-10-CM

## 2017-11-28 RX ORDER — LUBIPROSTONE 8 UG/1
CAPSULE, GELATIN COATED ORAL
Qty: 30 CAPSULE | Refills: 3 | Status: SHIPPED | OUTPATIENT
Start: 2017-11-28

## 2017-12-06 ENCOUNTER — OFFICE VISIT (OUTPATIENT)
Dept: CARDIOLOGY | Age: 82
End: 2017-12-06

## 2017-12-06 VITALS
SYSTOLIC BLOOD PRESSURE: 110 MMHG | WEIGHT: 115.6 LBS | OXYGEN SATURATION: 98 % | RESPIRATION RATE: 16 BRPM | BODY MASS INDEX: 25.02 KG/M2 | HEART RATE: 71 BPM | DIASTOLIC BLOOD PRESSURE: 70 MMHG

## 2017-12-06 DIAGNOSIS — I10 ESSENTIAL HYPERTENSION: ICD-10-CM

## 2017-12-06 DIAGNOSIS — R09.89 BRUIT OF LEFT CAROTID ARTERY: ICD-10-CM

## 2017-12-06 DIAGNOSIS — I48.0 PAROXYSMAL ATRIAL FIBRILLATION (HCC): Primary | ICD-10-CM

## 2017-12-06 DIAGNOSIS — I83.93 VARICOSE VEINS OF BOTH LOWER EXTREMITIES: ICD-10-CM

## 2017-12-06 DIAGNOSIS — R60.9 PERIPHERAL EDEMA: ICD-10-CM

## 2017-12-06 PROCEDURE — 1090F PRES/ABSN URINE INCON ASSESS: CPT | Performed by: INTERNAL MEDICINE

## 2017-12-06 PROCEDURE — G8427 DOCREV CUR MEDS BY ELIG CLIN: HCPCS | Performed by: INTERNAL MEDICINE

## 2017-12-06 PROCEDURE — G8598 ASA/ANTIPLAT THER USED: HCPCS | Performed by: INTERNAL MEDICINE

## 2017-12-06 PROCEDURE — 4040F PNEUMOC VAC/ADMIN/RCVD: CPT | Performed by: INTERNAL MEDICINE

## 2017-12-06 PROCEDURE — 1036F TOBACCO NON-USER: CPT | Performed by: INTERNAL MEDICINE

## 2017-12-06 PROCEDURE — 99213 OFFICE O/P EST LOW 20 MIN: CPT | Performed by: INTERNAL MEDICINE

## 2017-12-06 PROCEDURE — 1123F ACP DISCUSS/DSCN MKR DOCD: CPT | Performed by: INTERNAL MEDICINE

## 2017-12-06 PROCEDURE — G8419 CALC BMI OUT NRM PARAM NOF/U: HCPCS | Performed by: INTERNAL MEDICINE

## 2017-12-06 PROCEDURE — G8484 FLU IMMUNIZE NO ADMIN: HCPCS | Performed by: INTERNAL MEDICINE

## 2017-12-06 ASSESSMENT — ENCOUNTER SYMPTOMS
SHORTNESS OF BREATH: 0
CHEST TIGHTNESS: 0
APNEA: 0

## 2017-12-06 NOTE — PROGRESS NOTES
of right eye    Bilateral carotid artery stenosis       Allergies: Allergies   Allergen Reactions    Cephalexin     Cipro Xr     Doxycycline     Meprobamate     Penicillins     Relafen [Nabumetone]     Nsaids      Upset stomach at times. Personal History:   has a past medical history of Anxiety; Atrial fibrillation ([facility] [address] Bilateral carotid artery stenosis; Central retinal artery occlusion, right eye; Chronic constipation; Chronic venous insufficiency; Dyslipidemia; Essential hypertension; HTN (hypertension); Neurodermatitis; Osteoporosis; Pap test, as part of routine gynecological examination; Screening mammogram; Seronegative rheumatoid arthritis ([facility] [address] Status post total replacement of right hip; Vision loss of right eye; and Vitamin D deficiency. Social History:   reports that she has never smoked. She has never used smokeless tobacco. She reports that she drinks alcohol. She reports that she does not use drugs. Surgical History:  Past Surgical History:   Procedure Laterality Date    TOTAL HIP ARTHROPLASTY Right 2009       Family History:  family history includes Cancer in her daughter. Current Medications:    Current Outpatient Prescriptions:     rivaroxaban (XARELTO) 15 MG TABS tablet, Take 1 tablet by mouth daily (with breakfast), Disp: 90 tablet, Rfl: 3    AMITIZA 8 MCG CAPS capsule, TAKE 1 CAPSULE BY MOUTH ONCE DAILY, Disp: 30 capsule, Rfl: 3    atorvastatin (LIPITOR) 40 MG tablet, Take 40 mg by mouth, Disp: , Rfl:     losartan (COZAAR) 50 MG tablet, Take 50 mg by mouth daily, Disp: , Rfl:     nystatin (MYCOSTATIN) 795396 UNIT/GM cream, Apply topically 2 times daily. , Disp: 30 g, Rfl: 0    fluocinonide (LIDEX) 0.05 % cream, Apply topically 2 times daily as needed (itching), Disp: 15 g, Rfl: 3    diltiazem (CARDIZEM) 60 MG tablet, TAKE ONE (1) TABLET BY MOUTH THREE TIMES A DAY, Disp: 90 tablet, Rfl: 5    metoprolol succinate (TOPROL XL) 25 MG extended release tablet, TAKE 1 regular rhythm, normal heart sounds and intact distal pulses. Pulmonary/Chest: Effort normal and breath sounds normal.   Abdominal: Soft. Bowel sounds are normal.   Musculoskeletal: Normal range of motion. Neurological: She is alert and oriented to person, place, and time. She has normal reflexes. Skin: Skin is warm and dry. Psychiatric: She has a normal mood and affect. Her behavior is normal. Judgment and thought content normal.           Assessment/Orders:     ICD-10-CM ICD-9-CM    1. Paroxysmal atrial fibrillation (HCC) I48.0 427.31    2. Essential hypertension I10 401.9    3. Peripheral edema R60.9 782.3    4. Bruit of left carotid artery R09.89 785.9    5. Varicose veins of both lower extremities I83.93 454.9        Orders Placed This Encounter   Medications    rivaroxaban (XARELTO) 15 MG TABS tablet     Sig: Take 1 tablet by mouth daily (with breakfast)     Dispense:  90 tablet     Refill:  3       Medications Discontinued During This Encounter   Medication Reason    rivaroxaban (XARELTO) 15 MG TABS tablet Reorder       No orders of the defined types were placed in this encounter. Plan:  1. Patient to see me in 1 month.  > I will continue to monitor patient clinically, if symptoms develop or worsen, they are to let me know ASAP or head to the nearest emergeny room. > Follow up as discussed or call office sooner if needed.  > If refills are needed after appointment contact pharmacy.       Electronically signed by: Wilmer Victoria MD  12/6/2017 12:41 PM

## 2017-12-08 ENCOUNTER — TELEPHONE (OUTPATIENT)
Dept: FAMILY MEDICINE CLINIC | Age: 82
End: 2017-12-08

## 2017-12-08 NOTE — TELEPHONE ENCOUNTER
Patients son (personal rep) is aware. States she did not hit her head or anything and will take her to the ER if she has any of these. He has been watching her and says she seems to be fine. This is her first fall in 3 years because she was rushing to get to the phone and had lost her balance.

## 2017-12-08 NOTE — TELEPHONE ENCOUNTER
Dante Earing from home healthcare called to report a fall this morning, nickel size bruise on rt arm. No cuts and pt said she is ok.

## 2017-12-08 NOTE — TELEPHONE ENCOUNTER
Noted.  If there was any loss of consciousness, head injury with persistent headache, nausea/vomiting, vision changes, worsening hip or back pain, or change in mental status then patient needs evaluated in the ER.

## 2017-12-12 RX ORDER — LOSARTAN POTASSIUM 50 MG/1
50 TABLET ORAL DAILY
Qty: 90 TABLET | Refills: 3 | Status: SHIPPED | OUTPATIENT
Start: 2017-12-12 | End: 2017-12-26 | Stop reason: SDUPTHER

## 2017-12-12 RX ORDER — ATORVASTATIN CALCIUM 40 MG/1
40 TABLET, FILM COATED ORAL DAILY
Qty: 90 TABLET | Refills: 3 | Status: SHIPPED | OUTPATIENT
Start: 2017-12-12 | End: 2017-12-26 | Stop reason: SDUPTHER

## 2017-12-26 RX ORDER — ATORVASTATIN CALCIUM 40 MG/1
40 TABLET, FILM COATED ORAL DAILY
Qty: 30 TABLET | Refills: 1 | Status: SHIPPED | OUTPATIENT
Start: 2017-12-26 | End: 2017-12-26 | Stop reason: SDUPTHER

## 2017-12-26 RX ORDER — LOSARTAN POTASSIUM 50 MG/1
50 TABLET ORAL DAILY
Qty: 30 TABLET | Refills: 1 | Status: SHIPPED | OUTPATIENT
Start: 2017-12-26 | End: 2017-12-26 | Stop reason: SDUPTHER

## 2017-12-26 RX ORDER — ATORVASTATIN CALCIUM 40 MG/1
40 TABLET, FILM COATED ORAL DAILY
Qty: 90 TABLET | Refills: 1 | Status: SHIPPED | OUTPATIENT
Start: 2017-12-26

## 2017-12-26 RX ORDER — LOSARTAN POTASSIUM 50 MG/1
50 TABLET ORAL DAILY
Qty: 90 TABLET | Refills: 1 | Status: ON HOLD | OUTPATIENT
Start: 2017-12-26 | End: 2018-02-14 | Stop reason: HOSPADM

## 2018-01-04 ENCOUNTER — HOSPITAL ENCOUNTER (EMERGENCY)
Dept: ULTRASOUND IMAGING | Age: 83
Discharge: HOME | DRG: 603 | End: 2018-01-04
Payer: COMMERCIAL

## 2018-01-04 ENCOUNTER — HOSPITAL ENCOUNTER (INPATIENT)
Age: 83
LOS: 2 days | Discharge: NURSING, REHAB OR LONG-TERM CARE | DRG: 603 | End: 2018-01-06
Attending: EMERGENCY MEDICINE | Admitting: INTERNAL MEDICINE
Payer: COMMERCIAL

## 2018-01-04 ENCOUNTER — TELEPHONE (OUTPATIENT)
Dept: FAMILY MEDICINE CLINIC | Age: 83
End: 2018-01-04

## 2018-01-04 DIAGNOSIS — L03.115 CELLULITIS OF RIGHT LOWER EXTREMITY: Primary | ICD-10-CM

## 2018-01-04 PROBLEM — L02.415 CELLULITIS AND ABSCESS OF RIGHT LOWER EXTREMITY: Status: ACTIVE | Noted: 2018-01-04

## 2018-01-04 LAB
ALBUMIN SERPL-MCNC: 4 G/DL (ref 3.9–4.9)
ALP BLD-CCNC: 93 U/L (ref 40–130)
ALT SERPL-CCNC: 17 U/L (ref 0–33)
ANION GAP SERPL CALCULATED.3IONS-SCNC: 16 MEQ/L (ref 7–13)
APTT: 39.2 SEC (ref 21.6–35.4)
AST SERPL-CCNC: 22 U/L (ref 0–35)
BASOPHILS ABSOLUTE: 0.1 K/UL (ref 0–0.2)
BASOPHILS RELATIVE PERCENT: 0.5 %
BILIRUB SERPL-MCNC: 0.7 MG/DL (ref 0–1.2)
BUN BLDV-MCNC: 12 MG/DL (ref 8–23)
CALCIUM SERPL-MCNC: 10.1 MG/DL (ref 8.6–10.2)
CHLORIDE BLD-SCNC: 99 MEQ/L (ref 98–107)
CO2: 21 MEQ/L (ref 22–29)
CREAT SERPL-MCNC: 0.44 MG/DL (ref 0.5–0.9)
EOSINOPHILS ABSOLUTE: 0.2 K/UL (ref 0–0.7)
EOSINOPHILS RELATIVE PERCENT: 2 %
GFR AFRICAN AMERICAN: >60
GFR NON-AFRICAN AMERICAN: >60
GLOBULIN: 2.8 G/DL (ref 2.3–3.5)
GLUCOSE BLD-MCNC: 79 MG/DL (ref 74–109)
HCT VFR BLD CALC: 38.1 % (ref 37–47)
HEMOGLOBIN: 12.9 G/DL (ref 12–16)
INR BLD: 1.4
LYMPHOCYTES ABSOLUTE: 1.2 K/UL (ref 1–4.8)
LYMPHOCYTES RELATIVE PERCENT: 10.6 %
MCH RBC QN AUTO: 32.9 PG (ref 27–31.3)
MCHC RBC AUTO-ENTMCNC: 33.8 % (ref 33–37)
MCV RBC AUTO: 97.3 FL (ref 82–100)
MONOCYTES ABSOLUTE: 1 K/UL (ref 0.2–0.8)
MONOCYTES RELATIVE PERCENT: 8.8 %
NEUTROPHILS ABSOLUTE: 8.7 K/UL (ref 1.4–6.5)
NEUTROPHILS RELATIVE PERCENT: 78.1 %
PDW BLD-RTO: 13.7 % (ref 11.5–14.5)
PLATELET # BLD: 216 K/UL (ref 130–400)
POTASSIUM SERPL-SCNC: 4.1 MEQ/L (ref 3.5–5.1)
PROTHROMBIN TIME: 14 SEC (ref 9.6–12.3)
RBC # BLD: 3.91 M/UL (ref 4.2–5.4)
SODIUM BLD-SCNC: 136 MEQ/L (ref 132–144)
TOTAL PROTEIN: 6.8 G/DL (ref 6.4–8.1)
WBC # BLD: 11.1 K/UL (ref 4.8–10.8)

## 2018-01-04 PROCEDURE — 36415 COLL VENOUS BLD VENIPUNCTURE: CPT

## 2018-01-04 PROCEDURE — 96375 TX/PRO/DX INJ NEW DRUG ADDON: CPT

## 2018-01-04 PROCEDURE — 1210000000 HC MED SURG R&B

## 2018-01-04 PROCEDURE — 6360000002 HC RX W HCPCS: Performed by: EMERGENCY MEDICINE

## 2018-01-04 PROCEDURE — 96374 THER/PROPH/DIAG INJ IV PUSH: CPT

## 2018-01-04 PROCEDURE — 85025 COMPLETE CBC W/AUTO DIFF WBC: CPT

## 2018-01-04 PROCEDURE — 80053 COMPREHEN METABOLIC PANEL: CPT

## 2018-01-04 PROCEDURE — 85610 PROTHROMBIN TIME: CPT

## 2018-01-04 PROCEDURE — 96365 THER/PROPH/DIAG IV INF INIT: CPT

## 2018-01-04 PROCEDURE — 87077 CULTURE AEROBIC IDENTIFY: CPT

## 2018-01-04 PROCEDURE — 85730 THROMBOPLASTIN TIME PARTIAL: CPT

## 2018-01-04 PROCEDURE — 2580000003 HC RX 258: Performed by: INTERNAL MEDICINE

## 2018-01-04 PROCEDURE — 87040 BLOOD CULTURE FOR BACTERIA: CPT

## 2018-01-04 PROCEDURE — 96366 THER/PROPH/DIAG IV INF ADDON: CPT

## 2018-01-04 PROCEDURE — 93971 EXTREMITY STUDY: CPT

## 2018-01-04 PROCEDURE — 6360000002 HC RX W HCPCS: Performed by: INTERNAL MEDICINE

## 2018-01-04 PROCEDURE — 99284 EMERGENCY DEPT VISIT MOD MDM: CPT

## 2018-01-04 PROCEDURE — 6370000000 HC RX 637 (ALT 250 FOR IP): Performed by: INTERNAL MEDICINE

## 2018-01-04 PROCEDURE — 2580000003 HC RX 258: Performed by: EMERGENCY MEDICINE

## 2018-01-04 RX ORDER — SODIUM CHLORIDE 0.9 % (FLUSH) 0.9 %
10 SYRINGE (ML) INJECTION PRN
Status: DISCONTINUED | OUTPATIENT
Start: 2018-01-04 | End: 2018-01-06 | Stop reason: HOSPADM

## 2018-01-04 RX ORDER — POTASSIUM CHLORIDE 750 MG/1
10 TABLET, FILM COATED, EXTENDED RELEASE ORAL 2 TIMES DAILY
Status: DISCONTINUED | OUTPATIENT
Start: 2018-01-04 | End: 2018-01-06 | Stop reason: HOSPADM

## 2018-01-04 RX ORDER — SODIUM CHLORIDE 0.9 % (FLUSH) 0.9 %
10 SYRINGE (ML) INJECTION EVERY 12 HOURS SCHEDULED
Status: DISCONTINUED | OUTPATIENT
Start: 2018-01-04 | End: 2018-01-06 | Stop reason: HOSPADM

## 2018-01-04 RX ORDER — CLONIDINE HYDROCHLORIDE 0.1 MG/1
0.1 TABLET ORAL EVERY 4 HOURS PRN
Status: DISCONTINUED | OUTPATIENT
Start: 2018-01-04 | End: 2018-01-06 | Stop reason: HOSPADM

## 2018-01-04 RX ORDER — ACETAMINOPHEN 325 MG/1
650 TABLET ORAL EVERY 4 HOURS PRN
Status: DISCONTINUED | OUTPATIENT
Start: 2018-01-04 | End: 2018-01-06 | Stop reason: HOSPADM

## 2018-01-04 RX ORDER — FUROSEMIDE 10 MG/ML
20 INJECTION INTRAMUSCULAR; INTRAVENOUS 2 TIMES DAILY
Status: DISCONTINUED | OUTPATIENT
Start: 2018-01-04 | End: 2018-01-06

## 2018-01-04 RX ORDER — LUBIPROSTONE 8 UG/1
8 CAPSULE, GELATIN COATED ORAL 2 TIMES DAILY WITH MEALS
Status: DISCONTINUED | OUTPATIENT
Start: 2018-01-04 | End: 2018-01-06 | Stop reason: HOSPADM

## 2018-01-04 RX ORDER — LOSARTAN POTASSIUM 50 MG/1
50 TABLET ORAL 2 TIMES DAILY
Status: DISCONTINUED | OUTPATIENT
Start: 2018-01-04 | End: 2018-01-06 | Stop reason: HOSPADM

## 2018-01-04 RX ORDER — ATORVASTATIN CALCIUM 40 MG/1
40 TABLET, FILM COATED ORAL DAILY
Status: DISCONTINUED | OUTPATIENT
Start: 2018-01-04 | End: 2018-01-06 | Stop reason: HOSPADM

## 2018-01-04 RX ORDER — TRAMADOL HYDROCHLORIDE 50 MG/1
25 TABLET ORAL EVERY 6 HOURS PRN
Status: DISCONTINUED | OUTPATIENT
Start: 2018-01-04 | End: 2018-01-06 | Stop reason: HOSPADM

## 2018-01-04 RX ORDER — ONDANSETRON 2 MG/ML
4 INJECTION INTRAMUSCULAR; INTRAVENOUS EVERY 6 HOURS PRN
Status: DISCONTINUED | OUTPATIENT
Start: 2018-01-04 | End: 2018-01-06 | Stop reason: HOSPADM

## 2018-01-04 RX ORDER — METOPROLOL SUCCINATE 25 MG/1
25 TABLET, EXTENDED RELEASE ORAL DAILY
Status: DISCONTINUED | OUTPATIENT
Start: 2018-01-04 | End: 2018-01-06 | Stop reason: HOSPADM

## 2018-01-04 RX ORDER — PANTOPRAZOLE SODIUM 40 MG/1
40 TABLET, DELAYED RELEASE ORAL
Status: DISCONTINUED | OUTPATIENT
Start: 2018-01-05 | End: 2018-01-06 | Stop reason: HOSPADM

## 2018-01-04 RX ADMIN — FUROSEMIDE 20 MG: 10 INJECTION, SOLUTION INTRAMUSCULAR; INTRAVENOUS at 21:24

## 2018-01-04 RX ADMIN — WATER 1 G: 1 INJECTION INTRAMUSCULAR; INTRAVENOUS; SUBCUTANEOUS at 18:58

## 2018-01-04 RX ADMIN — Medication 10 ML: at 22:05

## 2018-01-04 RX ADMIN — METOPROLOL SUCCINATE 25 MG: 25 TABLET, FILM COATED, EXTENDED RELEASE ORAL at 21:24

## 2018-01-04 RX ADMIN — ATORVASTATIN CALCIUM 40 MG: 40 TABLET, FILM COATED ORAL at 21:24

## 2018-01-04 RX ADMIN — POTASSIUM CHLORIDE 10 MEQ: 10 TABLET, EXTENDED RELEASE ORAL at 21:24

## 2018-01-04 RX ADMIN — LOSARTAN POTASSIUM 50 MG: 50 TABLET ORAL at 21:24

## 2018-01-04 RX ADMIN — VANCOMYCIN HYDROCHLORIDE 750 MG: 750 INJECTION, POWDER, LYOPHILIZED, FOR SOLUTION INTRAVENOUS at 21:24

## 2018-01-04 ASSESSMENT — ENCOUNTER SYMPTOMS
ABDOMINAL PAIN: 0
SHORTNESS OF BREATH: 0
VOMITING: 0
ALLERGIC/IMMUNOLOGIC NEGATIVE: 1
WHEEZING: 0
EYES NEGATIVE: 1
NAUSEA: 0

## 2018-01-04 NOTE — ED PROVIDER NOTES
artery occlusion, right eye 10/17/2017    Chronic constipation     Chronic venous insufficiency     Dyslipidemia     Essential hypertension     HTN (hypertension) 6/29/2013    Neurodermatitis     chronic, of bilateral lower extremities    Osteoporosis     Pap test, as part of routine gynecological examination     years ago   [facility] Screening mammogram     about 5 yrs ago    Seronegative rheumatoid arthritis ([address]     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 Painless. - Had CT head without contrast at OSH ED that shows: No acute intracranial abnormality. 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 - EKG shows sinus bradycardia with PAC(R-58) - Concern for retinal artery occlusion?> 12 hours have passed. - Afib related? Carotid bruit? - BP was 210/85 initially and then came down to 160/69 -Normal CRP - No other acute neuro deficit seen. -Spoke to Optho on [facility]([id number])- alon [place] and he will arrange for pt to be seen today at [facility]. No acute intervention needed as pt well out of time frame(as per optho). Plan: - Admit to [facility] consult. Await optho input and consider further rx options after etiology is confirmed. - Fall precautions - Neuro checks - ECHO - Carotid doppler  Will monitor.  Vitamin D deficiency          SURGICAL HISTORY       Past Surgical History:   Procedure Laterality Date    TOTAL HIP ARTHROPLASTY Right 2009         CURRENT MEDICATIONS       Previous Medications    AMITIZA 8 MCG CAPS CAPSULE    TAKE 1 CAPSULE BY MOUTH ONCE DAILY    ATORVASTATIN (LIPITOR) 40 MG TABLET    Take 1 tablet by mouth daily    CALCIUM CARBONATE-VIT D-MIN (GNP CALCIUM PLUS 600 +D PO)    Take  by mouth daily.       DILTIAZEM

## 2018-01-05 LAB
BASOPHILS ABSOLUTE: 0.1 K/UL (ref 0–0.2)
BASOPHILS RELATIVE PERCENT: 0.6 %
EOSINOPHILS ABSOLUTE: 0.4 K/UL (ref 0–0.7)
EOSINOPHILS RELATIVE PERCENT: 4 %
HCT VFR BLD CALC: 36.9 % (ref 37–47)
HEMOGLOBIN: 12.6 G/DL (ref 12–16)
LYMPHOCYTES ABSOLUTE: 1 K/UL (ref 1–4.8)
LYMPHOCYTES RELATIVE PERCENT: 11.6 %
MCH RBC QN AUTO: 32.7 PG (ref 27–31.3)
MCHC RBC AUTO-ENTMCNC: 34.1 % (ref 33–37)
MCV RBC AUTO: 96.1 FL (ref 82–100)
MONOCYTES ABSOLUTE: 0.8 K/UL (ref 0.2–0.8)
MONOCYTES RELATIVE PERCENT: 9.2 %
NEUTROPHILS ABSOLUTE: 6.6 K/UL (ref 1.4–6.5)
NEUTROPHILS RELATIVE PERCENT: 74.6 %
PDW BLD-RTO: 13.6 % (ref 11.5–14.5)
PLATELET # BLD: 212 K/UL (ref 130–400)
RBC # BLD: 3.84 M/UL (ref 4.2–5.4)
WBC # BLD: 8.8 K/UL (ref 4.8–10.8)

## 2018-01-05 PROCEDURE — G8987 SELF CARE CURRENT STATUS: HCPCS

## 2018-01-05 PROCEDURE — 36415 COLL VENOUS BLD VENIPUNCTURE: CPT

## 2018-01-05 PROCEDURE — G8988 SELF CARE GOAL STATUS: HCPCS

## 2018-01-05 PROCEDURE — 97162 PT EVAL MOD COMPLEX 30 MIN: CPT

## 2018-01-05 PROCEDURE — 85025 COMPLETE CBC W/AUTO DIFF WBC: CPT

## 2018-01-05 PROCEDURE — 96376 TX/PRO/DX INJ SAME DRUG ADON: CPT

## 2018-01-05 PROCEDURE — G8978 MOBILITY CURRENT STATUS: HCPCS

## 2018-01-05 PROCEDURE — 2580000003 HC RX 258: Performed by: INTERNAL MEDICINE

## 2018-01-05 PROCEDURE — 96366 THER/PROPH/DIAG IV INF ADDON: CPT

## 2018-01-05 PROCEDURE — 6360000002 HC RX W HCPCS: Performed by: INTERNAL MEDICINE

## 2018-01-05 PROCEDURE — 97116 GAIT TRAINING THERAPY: CPT

## 2018-01-05 PROCEDURE — 97530 THERAPEUTIC ACTIVITIES: CPT

## 2018-01-05 PROCEDURE — 1210000000 HC MED SURG R&B

## 2018-01-05 PROCEDURE — 6370000000 HC RX 637 (ALT 250 FOR IP): Performed by: INTERNAL MEDICINE

## 2018-01-05 PROCEDURE — G8979 MOBILITY GOAL STATUS: HCPCS

## 2018-01-05 RX ADMIN — LOSARTAN POTASSIUM 50 MG: 50 TABLET ORAL at 22:29

## 2018-01-05 RX ADMIN — FUROSEMIDE 20 MG: 10 INJECTION, SOLUTION INTRAMUSCULAR; INTRAVENOUS at 17:47

## 2018-01-05 RX ADMIN — ATORVASTATIN CALCIUM 40 MG: 40 TABLET, FILM COATED ORAL at 22:29

## 2018-01-05 RX ADMIN — POTASSIUM CHLORIDE 10 MEQ: 10 TABLET, EXTENDED RELEASE ORAL at 22:30

## 2018-01-05 RX ADMIN — TRAMADOL HYDROCHLORIDE 25 MG: 50 TABLET, FILM COATED ORAL at 08:31

## 2018-01-05 RX ADMIN — POTASSIUM CHLORIDE 10 MEQ: 10 TABLET, EXTENDED RELEASE ORAL at 08:31

## 2018-01-05 RX ADMIN — FUROSEMIDE 20 MG: 10 INJECTION, SOLUTION INTRAMUSCULAR; INTRAVENOUS at 08:31

## 2018-01-05 RX ADMIN — VANCOMYCIN HYDROCHLORIDE 750 MG: 750 INJECTION, POWDER, LYOPHILIZED, FOR SOLUTION INTRAVENOUS at 22:30

## 2018-01-05 RX ADMIN — PANTOPRAZOLE SODIUM 40 MG: 40 TABLET, DELAYED RELEASE ORAL at 06:02

## 2018-01-05 RX ADMIN — RIVAROXABAN 15 MG: 15 TABLET, FILM COATED ORAL at 08:31

## 2018-01-05 RX ADMIN — LOSARTAN POTASSIUM 50 MG: 50 TABLET ORAL at 08:31

## 2018-01-05 RX ADMIN — METOPROLOL SUCCINATE 25 MG: 25 TABLET, FILM COATED, EXTENDED RELEASE ORAL at 08:31

## 2018-01-05 RX ADMIN — Medication 10 ML: at 08:32

## 2018-01-05 RX ADMIN — Medication 10 ML: at 22:31

## 2018-01-05 ASSESSMENT — PAIN SCALES - GENERAL
PAINLEVEL_OUTOF10: 0
PAINLEVEL_OUTOF10: 4
PAINLEVEL_OUTOF10: 6
PAINLEVEL_OUTOF10: 0
PAINLEVEL_OUTOF10: 4

## 2018-01-05 ASSESSMENT — PAIN DESCRIPTION - LOCATION
LOCATION: SHOULDER;LEG
LOCATION: SHOULDER

## 2018-01-05 ASSESSMENT — PAIN DESCRIPTION - ORIENTATION: ORIENTATION: LEFT

## 2018-01-05 ASSESSMENT — PAIN DESCRIPTION - PAIN TYPE
TYPE: CHRONIC PAIN
TYPE: CHRONIC PAIN;ACUTE PAIN

## 2018-01-05 ASSESSMENT — PAIN DESCRIPTION - DESCRIPTORS: DESCRIPTORS: ACHING

## 2018-01-05 ASSESSMENT — PAIN DESCRIPTION - FREQUENCY: FREQUENCY: CONTINUOUS

## 2018-01-05 NOTE — H&P
options after etiology is confirmed. - Fall precautions - Neuro checks - ECHO - Carotid doppler  Will monitor.  Vitamin D deficiency        Past Surgical History:          Procedure Laterality Date    TOTAL HIP ARTHROPLASTY Right 2009       Medications Prior to Admission:      Prior to Admission medications    Medication Sig Start Date End Date Taking? Authorizing Provider   atorvastatin (LIPITOR) 40 MG tablet Take 1 tablet by mouth daily 12/26/17  Yes Sonido Barakat CNP   losartan (COZAAR) 50 MG tablet Take 1 tablet by mouth daily 12/26/17  Yes Sonido Barakat CNP   rivaroxaban (XARELTO) 15 MG TABS tablet Take 1 tablet by mouth daily (with breakfast) 12/6/17  Yes Elzbieta Leone MD   AMITIZA 8 MCG CAPS capsule TAKE 1 CAPSULE BY MOUTH ONCE DAILY 11/28/17  Yes Concepcion Mendez MD   nystatin (MYCOSTATIN) 319688 UNIT/GM cream Apply topically 2 times daily. 10/11/17  Yes Concepcion Mendez MD   fluocinonide (LIDEX) 0.05 % cream Apply topically 2 times daily as needed (itching) 9/22/17  Yes Concepcion Mendez MD   diltiazem (CARDIZEM) 60 MG tablet TAKE ONE (1) TABLET BY MOUTH THREE TIMES A DAY 7/26/17  Yes Moncho Justice NP   metoprolol succinate (TOPROL XL) 25 MG extended release tablet TAKE 1 TABLET BY MOUTH ONCE DAILY 7/26/17  Yes Moncho Justice NP   omeprazole (PRILOSEC) 20 MG delayed release capsule TAKE ONE (1) CAPSULE BY MOUTH ONCE DAILY 2/27/17  Yes Sonido Barakat CNP   fluticasone (FLONASE) 50 MCG/ACT nasal spray USE 1 SPRAY IN EACH NOSTRIL ONCE DAILY 2/27/17  Yes Sonido Barakat CNP   traMADol (ULTRAM) 50 MG tablet TAKE 1/2 TABLET BY MOUTH EVERY 4 TO 6 HOURS AS NEEDED FOR PAIN 2/27/17  Yes Sonido aBrakat CNP   Lactobacillus (PROBIOTIC ACIDOPHILUS) TABS Take 1 tablet by mouth 2 times daily 6/24/16  Yes Sonido Barakat CNP   Calcium Carbonate-Vit D-Min (GNP CALCIUM PLUS 600 +D PO) Take  by mouth daily.      Yes Historical Provider, MD   Multiple Vitamin (MULTIVITAMIN PO) Take  by mouth

## 2018-01-05 NOTE — PLAN OF CARE
Problem: Nutritional:  Goal: Nutritional status will improve  Nutritional status will improve  Outcome: Ongoing

## 2018-01-06 ENCOUNTER — HOSPITAL ENCOUNTER (INPATIENT)
Age: 83
LOS: 8 days | Discharge: HOME | DRG: 603 | End: 2018-01-14
Attending: INTERNAL MEDICINE | Admitting: INTERNAL MEDICINE
Payer: COMMERCIAL

## 2018-01-06 VITALS
SYSTOLIC BLOOD PRESSURE: 158 MMHG | BODY MASS INDEX: 22.71 KG/M2 | RESPIRATION RATE: 18 BRPM | TEMPERATURE: 97.7 F | HEIGHT: 59 IN | DIASTOLIC BLOOD PRESSURE: 62 MMHG | WEIGHT: 112.66 LBS | OXYGEN SATURATION: 97 % | HEART RATE: 67 BPM

## 2018-01-06 DIAGNOSIS — R60.9 PERIPHERAL EDEMA: Primary | ICD-10-CM

## 2018-01-06 PROCEDURE — 97116 GAIT TRAINING THERAPY: CPT

## 2018-01-06 PROCEDURE — 96375 TX/PRO/DX INJ NEW DRUG ADDON: CPT

## 2018-01-06 PROCEDURE — 6370000000 HC RX 637 (ALT 250 FOR IP): Performed by: INTERNAL MEDICINE

## 2018-01-06 PROCEDURE — 6360000002 HC RX W HCPCS: Performed by: INTERNAL MEDICINE

## 2018-01-06 PROCEDURE — 97110 THERAPEUTIC EXERCISES: CPT

## 2018-01-06 PROCEDURE — 96376 TX/PRO/DX INJ SAME DRUG ADON: CPT

## 2018-01-06 PROCEDURE — 1200000000 HC SEMI PRIVATE

## 2018-01-06 RX ORDER — POTASSIUM CHLORIDE 750 MG/1
10 TABLET, FILM COATED, EXTENDED RELEASE ORAL 2 TIMES DAILY
Status: CANCELLED | OUTPATIENT
Start: 2018-01-06

## 2018-01-06 RX ORDER — LUBIPROSTONE 8 UG/1
8 CAPSULE, GELATIN COATED ORAL 2 TIMES DAILY WITH MEALS
Status: DISCONTINUED | OUTPATIENT
Start: 2018-01-06 | End: 2018-01-07

## 2018-01-06 RX ORDER — POTASSIUM CHLORIDE 750 MG/1
10 TABLET, FILM COATED, EXTENDED RELEASE ORAL 2 TIMES DAILY
Status: DISCONTINUED | OUTPATIENT
Start: 2018-01-06 | End: 2018-01-14 | Stop reason: HOSPADM

## 2018-01-06 RX ORDER — CEPHALEXIN 500 MG/1
500 CAPSULE ORAL EVERY 8 HOURS SCHEDULED
Status: DISCONTINUED | OUTPATIENT
Start: 2018-01-06 | End: 2018-01-06 | Stop reason: HOSPADM

## 2018-01-06 RX ORDER — PANTOPRAZOLE SODIUM 40 MG/1
40 TABLET, DELAYED RELEASE ORAL
Status: CANCELLED | OUTPATIENT
Start: 2018-01-07

## 2018-01-06 RX ORDER — CEPHALEXIN 500 MG/1
500 CAPSULE ORAL EVERY 8 HOURS SCHEDULED
Status: DISCONTINUED | OUTPATIENT
Start: 2018-01-06 | End: 2018-01-14 | Stop reason: HOSPADM

## 2018-01-06 RX ORDER — LOSARTAN POTASSIUM 50 MG/1
50 TABLET ORAL 2 TIMES DAILY
Status: DISCONTINUED | OUTPATIENT
Start: 2018-01-06 | End: 2018-01-14 | Stop reason: HOSPADM

## 2018-01-06 RX ORDER — PANTOPRAZOLE SODIUM 40 MG/1
40 TABLET, DELAYED RELEASE ORAL
Status: DISCONTINUED | OUTPATIENT
Start: 2018-01-07 | End: 2018-01-14 | Stop reason: HOSPADM

## 2018-01-06 RX ORDER — FUROSEMIDE 20 MG/1
20 TABLET ORAL DAILY
Status: DISCONTINUED | OUTPATIENT
Start: 2018-01-06 | End: 2018-01-06 | Stop reason: HOSPADM

## 2018-01-06 RX ORDER — LOSARTAN POTASSIUM 50 MG/1
50 TABLET ORAL 2 TIMES DAILY
Status: CANCELLED | OUTPATIENT
Start: 2018-01-06

## 2018-01-06 RX ORDER — TRAMADOL HYDROCHLORIDE 50 MG/1
25 TABLET ORAL EVERY 6 HOURS PRN
Status: CANCELLED | OUTPATIENT
Start: 2018-01-06

## 2018-01-06 RX ORDER — ACETAMINOPHEN 325 MG/1
650 TABLET ORAL EVERY 4 HOURS PRN
Status: DISCONTINUED | OUTPATIENT
Start: 2018-01-06 | End: 2018-01-14 | Stop reason: HOSPADM

## 2018-01-06 RX ORDER — ACETAMINOPHEN 325 MG/1
650 TABLET ORAL EVERY 4 HOURS PRN
Status: CANCELLED | OUTPATIENT
Start: 2018-01-06

## 2018-01-06 RX ORDER — ONDANSETRON 2 MG/ML
4 INJECTION INTRAMUSCULAR; INTRAVENOUS EVERY 6 HOURS PRN
Status: CANCELLED | OUTPATIENT
Start: 2018-01-06

## 2018-01-06 RX ORDER — CEPHALEXIN 500 MG/1
500 CAPSULE ORAL EVERY 8 HOURS SCHEDULED
Status: CANCELLED | OUTPATIENT
Start: 2018-01-06

## 2018-01-06 RX ORDER — CLONIDINE HYDROCHLORIDE 0.1 MG/1
0.1 TABLET ORAL EVERY 4 HOURS PRN
Status: DISCONTINUED | OUTPATIENT
Start: 2018-01-06 | End: 2018-01-14 | Stop reason: HOSPADM

## 2018-01-06 RX ORDER — FUROSEMIDE 20 MG/1
20 TABLET ORAL DAILY
Status: CANCELLED | OUTPATIENT
Start: 2018-01-06

## 2018-01-06 RX ORDER — CLONIDINE HYDROCHLORIDE 0.1 MG/1
0.1 TABLET ORAL EVERY 4 HOURS PRN
Status: CANCELLED | OUTPATIENT
Start: 2018-01-06

## 2018-01-06 RX ORDER — SODIUM CHLORIDE 0.9 % (FLUSH) 0.9 %
10 SYRINGE (ML) INJECTION EVERY 12 HOURS SCHEDULED
Status: CANCELLED | OUTPATIENT
Start: 2018-01-06

## 2018-01-06 RX ORDER — ONDANSETRON 2 MG/ML
4 INJECTION INTRAMUSCULAR; INTRAVENOUS EVERY 6 HOURS PRN
Status: DISCONTINUED | OUTPATIENT
Start: 2018-01-06 | End: 2018-01-14 | Stop reason: HOSPADM

## 2018-01-06 RX ORDER — SODIUM CHLORIDE 0.9 % (FLUSH) 0.9 %
10 SYRINGE (ML) INJECTION PRN
Status: DISCONTINUED | OUTPATIENT
Start: 2018-01-06 | End: 2018-01-14 | Stop reason: HOSPADM

## 2018-01-06 RX ORDER — FUROSEMIDE 20 MG/1
20 TABLET ORAL DAILY
Status: DISCONTINUED | OUTPATIENT
Start: 2018-01-07 | End: 2018-01-14 | Stop reason: HOSPADM

## 2018-01-06 RX ORDER — SODIUM CHLORIDE 0.9 % (FLUSH) 0.9 %
10 SYRINGE (ML) INJECTION PRN
Status: CANCELLED | OUTPATIENT
Start: 2018-01-06

## 2018-01-06 RX ORDER — LUBIPROSTONE 8 UG/1
8 CAPSULE, GELATIN COATED ORAL 2 TIMES DAILY WITH MEALS
Status: CANCELLED | OUTPATIENT
Start: 2018-01-06

## 2018-01-06 RX ORDER — METOPROLOL SUCCINATE 25 MG/1
25 TABLET, EXTENDED RELEASE ORAL DAILY
Status: CANCELLED | OUTPATIENT
Start: 2018-01-07

## 2018-01-06 RX ORDER — SODIUM CHLORIDE 0.9 % (FLUSH) 0.9 %
10 SYRINGE (ML) INJECTION EVERY 12 HOURS SCHEDULED
Status: DISCONTINUED | OUTPATIENT
Start: 2018-01-07 | End: 2018-01-09

## 2018-01-06 RX ORDER — ATORVASTATIN CALCIUM 40 MG/1
40 TABLET, FILM COATED ORAL NIGHTLY
Status: DISCONTINUED | OUTPATIENT
Start: 2018-01-06 | End: 2018-01-14 | Stop reason: HOSPADM

## 2018-01-06 RX ORDER — METOPROLOL SUCCINATE 25 MG/1
25 TABLET, EXTENDED RELEASE ORAL DAILY
Status: DISCONTINUED | OUTPATIENT
Start: 2018-01-07 | End: 2018-01-14 | Stop reason: HOSPADM

## 2018-01-06 RX ORDER — ATORVASTATIN CALCIUM 40 MG/1
40 TABLET, FILM COATED ORAL DAILY
Status: DISCONTINUED | OUTPATIENT
Start: 2018-01-07 | End: 2018-01-06

## 2018-01-06 RX ORDER — TRAMADOL HYDROCHLORIDE 50 MG/1
25 TABLET ORAL EVERY 6 HOURS PRN
Status: DISCONTINUED | OUTPATIENT
Start: 2018-01-06 | End: 2018-01-14 | Stop reason: HOSPADM

## 2018-01-06 RX ORDER — ATORVASTATIN CALCIUM 40 MG/1
40 TABLET, FILM COATED ORAL DAILY
Status: CANCELLED | OUTPATIENT
Start: 2018-01-07

## 2018-01-06 RX ADMIN — CEPHALEXIN 500 MG: 500 CAPSULE ORAL at 14:50

## 2018-01-06 RX ADMIN — POTASSIUM CHLORIDE 10 MEQ: 750 TABLET, FILM COATED, EXTENDED RELEASE ORAL at 20:19

## 2018-01-06 RX ADMIN — METOPROLOL SUCCINATE 25 MG: 25 TABLET, FILM COATED, EXTENDED RELEASE ORAL at 09:04

## 2018-01-06 RX ADMIN — CEPHALEXIN 500 MG: 500 CAPSULE ORAL at 20:21

## 2018-01-06 RX ADMIN — LOSARTAN POTASSIUM 50 MG: 50 TABLET ORAL at 20:19

## 2018-01-06 RX ADMIN — FUROSEMIDE 20 MG: 10 INJECTION, SOLUTION INTRAMUSCULAR; INTRAVENOUS at 09:05

## 2018-01-06 RX ADMIN — TRAMADOL HYDROCHLORIDE 25 MG: 50 TABLET, FILM COATED ORAL at 09:04

## 2018-01-06 RX ADMIN — POTASSIUM CHLORIDE 10 MEQ: 10 TABLET, EXTENDED RELEASE ORAL at 09:04

## 2018-01-06 RX ADMIN — PANTOPRAZOLE SODIUM 40 MG: 40 TABLET, DELAYED RELEASE ORAL at 06:16

## 2018-01-06 RX ADMIN — ATORVASTATIN CALCIUM 40 MG: 40 TABLET, FILM COATED ORAL at 20:19

## 2018-01-06 RX ADMIN — RIVAROXABAN 15 MG: 15 TABLET, FILM COATED ORAL at 09:04

## 2018-01-06 RX ADMIN — LOSARTAN POTASSIUM 50 MG: 50 TABLET ORAL at 09:04

## 2018-01-06 ASSESSMENT — PAIN SCALES - GENERAL
PAINLEVEL_OUTOF10: 0
PAINLEVEL_OUTOF10: 5
PAINLEVEL_OUTOF10: 0

## 2018-01-06 ASSESSMENT — PAIN SCALES - WONG BAKER: WONGBAKER_NUMERICALRESPONSE: 0

## 2018-01-06 NOTE — DISCHARGE SUMMARY
tablet  Commonly known as:  ULTRAM  TAKE 1/2 TABLET BY MOUTH EVERY 4 TO 6 HOURS AS NEEDED FOR PAIN            Physical Exam:    Vitals:  Vitals:    01/06/18 0000 01/06/18 0014 01/06/18 0021 01/06/18 0600   BP: (!) 158/62  (!) 158/62    Pulse: 64  67    Resp: 18  18    Temp: 97.8 °F (36.6 °C)  97.7 °F (36.5 °C)    TempSrc: Oral  Oral    SpO2: 98% 97% 97%    Weight:    112 lb 10.5 oz (51.1 kg)   Height:         Weight: Weight: 112 lb 10.5 oz (51.1 kg)     24 hour intake/output:  Intake/Output Summary (Last 24 hours) at 01/06/18 0934  Last data filed at 01/06/18 0679   Gross per 24 hour   Intake              550 ml   Output              650 ml   Net             -100 ml       General appearance - alert, well appearing, and in no distress  Chest - clear to auscultation, no wheezes, rales or rhonchi, symmetric air entry  Heart - normal rate, regular rhythm, normal S1, S2, no murmurs, rubs, clicks or gallops  Abdomen - soft, nontender, nondistended, no masses or organomegaly  Obese: No; Protuberant: No   Neurological - alert, oriented, normal speech, no focal findings or movement disorder noted  Extremities - R lower leg redness  Skin - normal coloration and turgor, no rashes, no suspicious skin lesions noted      Radiology reports as per the Radiologist  Radiology: Us Dup Lower Extremity Right Clare    Result Date: 1/4/2018  No deep venous thrombosis seen on this examination in the right common femoral, femoral and popliteal veins. The calf veins are not well seen due to edema. . COMPARISON: No prior studies available for comparison. HISTORY:  swelling/edema TECHNIQUE: US DUP LOWER EXTREMITY RIGHT CLARE FINDINGS: Ultrasound was performed from the groin to the knee. The right common femoral, femoral and popliteal veins are augmentable and compressible. The Doppler images show blood flow. The calf veins are not well seen due to the edema. .  . Diet:  DIET GENERAL;     Activity:  Activity as tolerated (Patient may move about with assist as indicated or with supervision.)      Disposition: subacute status    Condition: Stable    Time Spent: 50 minutes    Electronically signed by Oval Blizzard, MD on 1/6/2018 at 9:34 AM    Discharging Hospitalist

## 2018-01-06 NOTE — PROGRESS NOTES
Pt. Transferred to sub acute care today, will stay in room 206. Pt. Is alert and oriented x 3, denies pain. Right leg and foot are warm to touch, pedal pulse palp. Dry skin on bilat legs shown to podiatrist, see new orders.

## 2018-01-06 NOTE — PROGRESS NOTES
Chart reviewed and patient discussed in daily quality rounds. Patient is from home with her son and is normally pretty independent with ambulation (uses a walker) and ADLs. She has had [facility] ([phone]) for PT/OT in the past (11 & 12/17) per her son. Spoke with patient and son regarding her PT evaluation and the benefits of inpatient skilled rehab prior to discharge home. Son is in agreement with the plan and wants her to stay here at [facility] for it. Patient would prefer to go home and use [facility] again but agrees she is not at her baseline and since her son has pneumonia she agrees to stay here. Informed House Supervisor that patient would need a precert from her insurance in order to admit to the skilled unit. SS to follow.
Physical Therapy  Facility/Department: [facility] MED SURG UNIT  Daily Treatment Note  NAME: Purvi Del Rio  : 10/9/1926  MRN: [id number]    Date of Service: 2018    Patient Diagnosis(es):   Patient Active Problem List    Diagnosis Date Noted    Cellulitis and abscess of right lower extremity 2018    Bilateral carotid artery stenosis 10/27/2017    Central retinal artery occlusion, right eye 10/17/2017    Vision loss of right eye 10/17/2017    Status post total replacement of right hip 2017    Varicose veins of both lower extremities 2017    Bruit of left carotid artery 2017    Atrial fibrillation ([address] 2014    HTN (hypertension) 2013    Peripheral edema 2013    Vitamin D deficiency     Dyslipidemia     Osteoporosis     Chronic constipation     Neurodermatitis     Chronic venous insufficiency     Anxiety     Seronegative rheumatoid arthritis ([address]        Past Medical History:   Diagnosis Date    Anxiety     Atrial fibrillation ([address]     Bilateral carotid artery stenosis 10/27/2017    Central retinal artery occlusion, right eye 10/17/2017    Chronic constipation     Chronic venous insufficiency     Dyslipidemia     Essential hypertension     HTN (hypertension) 2013    Neurodermatitis     chronic, of bilateral lower extremities    Osteoporosis     Pap test, as part of routine gynecological examination     years [facility]     about 5 yrs [facility]    Seronegative rheumatoid arthritis ([address]     Status post total replacement of right hip 2017    Vision loss of right eye 10/17/2017    Overview:  - 80year old female who presents with PMH of HTN,AFIB(declined AC/aspirin),HLP presented to OSH yesterday evening with C/O right eye vision loss around 5 pm yesterday. Painless. - Had CT head without contrast at OSH ED that shows: No acute intracranial abnormality. 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  -
bedroom/bathroom  Home Access: Stairs to enter with rails  Entrance Stairs - Number of Steps: 3  Entrance Stairs - Rails: Both (wide rails- both hands used on 1 rail)  Bathroom Shower/Tub: Shower chair without back, Walk-in shower  Bathroom Toilet: Standard  Bathroom Equipment: Shower chair, Grab bars in shower, Hand-held shower  Bathroom Accessibility: Walker accessible  Home Equipment: Rolling walker, Cane (basket with walker)  Receives Help From: Family, Home health ([facility] nurse who comes \"about every 2 weeks\")  ADL Assistance: Needs assistance  Homemaking Assistance:  (son does cleaning, only duty is dishes)  Homemaking Responsibilities:  (dishes only)  Ambulation Assistance: Independent (ww )  Transfer Assistance: Independent  Active : No  Occupation: Retired  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 pt has scratches on BLE R > L distal area     ADL  Grooming: Minimal assistance  LE Dressing:  Moderate assistance  Toileting: Contact guard assistance (use of grab bars, RW)  Additional Comments: demo'd good safety awareness with toilet xfer backing up to toilet, reaching for grab bar prior to sitting                                   LUE AROM (degrees)  LUE AROM : Exceptions  L Shoulder Flexion 0-180: ~ 0-80* (\"been that way for a long time\")  RUE AROM (degrees)  RUE AROM : Exceptions  R Shoulder Flexion 0-180: ~ 0-70* (\"been that way for a long time\")  LUE Strength  Gross LUE Strength: Exceptions to [facility]  LUE Strength
vc)  Ambulation  Ambulation?: Yes  More Ambulation?: Yes  Ambulation 1  Surface: level tile  Device: Rolling Walker  Other Apparatus:  (brace not availale for L ankle, at home, pt defers ace wrap at this time)  Assistance: Contact guard assistance;Minimal assistance  Quality of Gait: small step leneght walks on toes of left foot  without brace/ inverted position from old injury  Distance: 10ft   Toileting CGA and min vc for AD  Ambulation 2  Surface - 2: level tile  Device 2: Rolling Walker  Other Apparatus 2:  (L ankle/ foot brace a home, son cant bring has pneumonia)  Assistance 2: Contact guard assistance;Minimal assistance  Quality of Gait 2: small WILFRIDO, better pace with practice, walks on toes with inverted foot left from old injury  Distance: 70ft  Comments: note left on wipeboard for family to bring brace and shoes in if possible     Balance  Sitting - Static: Good  Sitting - Dynamic: Good  Standing - Static: Good;-  Standing - Dynamic: Fair;+  Exercises  Ankle Pumps: verbally reviewed AP for circulation and healing     Assessment   Body structures, Functions, Activity limitations: Decreased functional mobility ; Decreased strength;Decreased endurance;Decreased balance  Assessment: 89yof with RLE cellulitis and noted decrease in functional mobiliy who will benefit from PT to address appropriate LTG  Treatment Diagnosis: difficulty walking and LE weakness with RLE cellulitis  Prognosis: Good  Clinical Presentation: old left ankle/ foot injury with bandage plantar foot where bone out of place, wears brace to walk at home  Patient Education: educated therapy and goals, call for assist to walk, and frequent ankle pumps for circulation  REQUIRES PT FOLLOW UP: Yes  Activity Tolerance  Activity Tolerance: Patient Tolerated treatment well;Patient limited by fatigue     Discharge Recommendations:  Home with Home health PT, [address] (subacute or SNF if IV needs, or PRN assist with [place] [facility] )      Plan

## 2018-01-07 PROBLEM — L03.115 CELLULITIS OF RIGHT LEG: Status: ACTIVE | Noted: 2018-01-07

## 2018-01-07 PROCEDURE — 6370000000 HC RX 637 (ALT 250 FOR IP): Performed by: PODIATRIST

## 2018-01-07 PROCEDURE — 97162 PT EVAL MOD COMPLEX 30 MIN: CPT

## 2018-01-07 PROCEDURE — 97530 THERAPEUTIC ACTIVITIES: CPT

## 2018-01-07 PROCEDURE — 6370000000 HC RX 637 (ALT 250 FOR IP): Performed by: INTERNAL MEDICINE

## 2018-01-07 PROCEDURE — 1200000000 HC SEMI PRIVATE

## 2018-01-07 PROCEDURE — 97116 GAIT TRAINING THERAPY: CPT

## 2018-01-07 PROCEDURE — 2500000003 HC RX 250 WO HCPCS: Performed by: PODIATRIST

## 2018-01-07 RX ORDER — LUBIPROSTONE 8 UG/1
8 CAPSULE, GELATIN COATED ORAL 2 TIMES DAILY WITH MEALS
Status: DISCONTINUED | OUTPATIENT
Start: 2018-01-07 | End: 2018-01-09

## 2018-01-07 RX ADMIN — RIVAROXABAN 15 MG: 15 TABLET, FILM COATED ORAL at 08:28

## 2018-01-07 RX ADMIN — CEPHALEXIN 500 MG: 500 CAPSULE ORAL at 14:54

## 2018-01-07 RX ADMIN — SILVER SULFADIAZINE: 10 CREAM TOPICAL at 21:22

## 2018-01-07 RX ADMIN — NYSTATIN AND TRIAMCINOLONE ACETONIDE: 100000; 1 CREAM TOPICAL at 21:22

## 2018-01-07 RX ADMIN — METOPROLOL SUCCINATE 25 MG: 25 TABLET, FILM COATED, EXTENDED RELEASE ORAL at 08:28

## 2018-01-07 RX ADMIN — LOSARTAN POTASSIUM 50 MG: 50 TABLET ORAL at 08:28

## 2018-01-07 RX ADMIN — CEPHALEXIN 500 MG: 500 CAPSULE ORAL at 06:42

## 2018-01-07 RX ADMIN — PANTOPRAZOLE SODIUM 40 MG: 40 TABLET, DELAYED RELEASE ORAL at 06:42

## 2018-01-07 RX ADMIN — FUROSEMIDE 20 MG: 20 TABLET ORAL at 08:28

## 2018-01-07 RX ADMIN — POTASSIUM CHLORIDE 10 MEQ: 750 TABLET, FILM COATED, EXTENDED RELEASE ORAL at 08:28

## 2018-01-07 RX ADMIN — CEPHALEXIN 500 MG: 500 CAPSULE ORAL at 21:19

## 2018-01-07 RX ADMIN — ATORVASTATIN CALCIUM 40 MG: 40 TABLET, FILM COATED ORAL at 21:18

## 2018-01-07 RX ADMIN — POTASSIUM CHLORIDE 10 MEQ: 750 TABLET, FILM COATED, EXTENDED RELEASE ORAL at 21:18

## 2018-01-07 ASSESSMENT — PAIN SCALES - GENERAL
PAINLEVEL_OUTOF10: 0

## 2018-01-07 NOTE — PROGRESS NOTES
Orientation Status: Within Normal Limits (to person, place and time)    Social/Functional History  Social/Functional History  Lives With: Son  Type of Home: House  Home Layout: Two level  Home Access: Stairs to enter with rails  Entrance Stairs - Number of Steps:  (3)  Entrance Stairs - Rails: Both  Bathroom Shower/Tub: Walk-in shower  Bathroom Toilet: Standard  Bathroom Equipment: Grab bars in shower, Grab bars around toilet  Bathroom Accessibility: Walker accessible  Home Equipment: Rolling walker  Receives Help From: Family  ADL Assistance: Needs assistance  Homemaking Assistance: Needs assistance  Active : No  Leisure & Hobbies:  (baseball games)  Objective          AROM RLE (degrees)  RLE General AROM: WFL  AROM LLE (degrees)  LLE General AROM: WFL  Strength RLE  Comment: generally 4/5  Strength LLE  Comment: Generally 4/5  Strength RUE  Comment:  (See OT, severe arthritic changes in right hand)  Strength LUE  Comment:  (See OT)  Tone RLE  RLE Tone: Normotonic  Tone LLE  LLE Tone: Normotonic  Coordination  Movements Are Fluid And Coordinated: Yes  Motor Control  Gross Motor?: WFL  Sensation  Overall Sensation Status: WFL (intact to light touch)  Bed mobility  Bridging: Modified independent   Transfers  Sit to Stand: Independent  Stand to sit: Modified independent (Does not use left hand sitting due to shoulder arthritis)  Ambulation  Ambulation?: Yes  WB Status: Left ankle brace would make WB more comfortable (no restrictions)  Ambulation 1  Surface: level tile  Device: Rolling Walker  Assistance: Stand by assistance  Quality of Gait: Appears to have scolosis (Good pace, appears to have one leg  longer than the other)  Distance: 145'x2  Comments: ankle braewould make ambulation more comfortable  Stairs/Curb  Stairs?: No (but needds 3 with rails)     Balance  Posture: Fair  Sitting - Static: Good  Sitting - Dynamic: Good  Standing - Static: Good  Standing - Dynamic: Fair;+ (right leg kicked left during amb x1)        Assessment   Body structures, Functions, Activity limitations: Decreased functional mobility   Assessment:  (Pt highly motivated, should o well)  Treatment Diagnosis:  (decreased mobility)  Prognosis: Good  Decision Making: Medium Complexity  Patient Education: talked about POC  REQUIRES PT FOLLOW UP: Yes  Activity Tolerance  Activity Tolerance: Patient Tolerated treatment well  PT Equipment Recommendations  Equipment Needed: No  Other Comments  Comments: Howie Andres will have casey chairez l ankle brace in     Discharge Recommendations:  Home with assist PRN      Plan   Plan  Times per week:  (5-7 days per week)  Times per day:  (1-2x/day)  Plan weeks:  (1 week)  Current Treatment Recommendations: Strengthening, Functional Mobility Training, Transfer Training  Safety Devices  Type of devices: All fall risk precautions in place  Restraints  Initially in place: Yes    G-Code     OutComes Score                                           AM-PAC Score             Goals  Short term goals  Time Frame for Short term goals: 2-4 days  Short term goal 1: Indep transitions  Short term goal 2: Ambulate with ww with kicking own feet together with SBA  Short term goal 3: Perfrom steps with HR with SBA  Long term goals  Time Frame for Long term goals : 4-7 days  Long term goal 1: I transtions  Long term goal 2:  Indep amb with ww  Long term goal 3: Steps with HR and SBA  Long term goal 4: Establish HEP  Patient Goals   Patient goals : Go home soon       Therapy Time   Individual Concurrent Group Co-treatment   Time In  9:30         Time Out  10:30         Minutes  60 min                 Lefty Partida, PT  License and Documentation Cosign  Therapy License Number: [id number]

## 2018-01-07 NOTE — PROGRESS NOTES
Saw patient today while she was a medical patient. Notified her that the consult is carrying over to [facility].

## 2018-01-08 LAB
ANION GAP SERPL CALCULATED.3IONS-SCNC: 17 MEQ/L (ref 7–13)
BASOPHILS ABSOLUTE: 0.1 K/UL (ref 0–0.2)
BASOPHILS RELATIVE PERCENT: 0.8 %
BUN BLDV-MCNC: 21 MG/DL (ref 8–23)
CALCIUM SERPL-MCNC: 9.5 MG/DL (ref 8.6–10.2)
CHLORIDE BLD-SCNC: 96 MEQ/L (ref 98–107)
CO2: 22 MEQ/L (ref 22–29)
CREAT SERPL-MCNC: 0.63 MG/DL (ref 0.5–0.9)
EOSINOPHILS ABSOLUTE: 0.4 K/UL (ref 0–0.7)
EOSINOPHILS RELATIVE PERCENT: 5 %
GFR AFRICAN AMERICAN: >60
GFR NON-AFRICAN AMERICAN: >60
GLUCOSE BLD-MCNC: 92 MG/DL (ref 74–109)
HCT VFR BLD CALC: 42.2 % (ref 37–47)
HEMOGLOBIN: 14.1 G/DL (ref 12–16)
LYMPHOCYTES ABSOLUTE: 1.7 K/UL (ref 1–4.8)
LYMPHOCYTES RELATIVE PERCENT: 21.9 %
MCH RBC QN AUTO: 32.1 PG (ref 27–31.3)
MCHC RBC AUTO-ENTMCNC: 33.4 % (ref 33–37)
MCV RBC AUTO: 96.2 FL (ref 82–100)
MONOCYTES ABSOLUTE: 0.7 K/UL (ref 0.2–0.8)
MONOCYTES RELATIVE PERCENT: 9.4 %
NEUTROPHILS ABSOLUTE: 5 K/UL (ref 1.4–6.5)
NEUTROPHILS RELATIVE PERCENT: 62.9 %
PDW BLD-RTO: 13.5 % (ref 11.5–14.5)
PLATELET # BLD: 252 K/UL (ref 130–400)
POTASSIUM SERPL-SCNC: 4.9 MEQ/L (ref 3.5–5.1)
RBC # BLD: 4.38 M/UL (ref 4.2–5.4)
SODIUM BLD-SCNC: 135 MEQ/L (ref 132–144)
WBC # BLD: 7.9 K/UL (ref 4.8–10.8)

## 2018-01-08 PROCEDURE — G8987 SELF CARE CURRENT STATUS: HCPCS

## 2018-01-08 PROCEDURE — 97530 THERAPEUTIC ACTIVITIES: CPT

## 2018-01-08 PROCEDURE — 85025 COMPLETE CBC W/AUTO DIFF WBC: CPT

## 2018-01-08 PROCEDURE — 97165 OT EVAL LOW COMPLEX 30 MIN: CPT

## 2018-01-08 PROCEDURE — 6370000000 HC RX 637 (ALT 250 FOR IP): Performed by: INTERNAL MEDICINE

## 2018-01-08 PROCEDURE — 97110 THERAPEUTIC EXERCISES: CPT

## 2018-01-08 PROCEDURE — 97116 GAIT TRAINING THERAPY: CPT

## 2018-01-08 PROCEDURE — 1200000000 HC SEMI PRIVATE

## 2018-01-08 PROCEDURE — G8988 SELF CARE GOAL STATUS: HCPCS

## 2018-01-08 PROCEDURE — 80048 BASIC METABOLIC PNL TOTAL CA: CPT

## 2018-01-08 PROCEDURE — 36415 COLL VENOUS BLD VENIPUNCTURE: CPT

## 2018-01-08 PROCEDURE — 97535 SELF CARE MNGMENT TRAINING: CPT

## 2018-01-08 RX ORDER — AMLODIPINE BESYLATE 5 MG/1
5 TABLET ORAL DAILY
Status: DISCONTINUED | OUTPATIENT
Start: 2018-01-08 | End: 2018-01-14 | Stop reason: HOSPADM

## 2018-01-08 RX ORDER — DOCUSATE SODIUM 100 MG/1
100 CAPSULE, LIQUID FILLED ORAL 2 TIMES DAILY
Status: DISCONTINUED | OUTPATIENT
Start: 2018-01-08 | End: 2018-01-14 | Stop reason: HOSPADM

## 2018-01-08 RX ADMIN — SILVER SULFADIAZINE: 10 CREAM TOPICAL at 20:09

## 2018-01-08 RX ADMIN — NYSTATIN AND TRIAMCINOLONE ACETONIDE: 100000; 1 CREAM TOPICAL at 20:09

## 2018-01-08 RX ADMIN — ATORVASTATIN CALCIUM 40 MG: 40 TABLET, FILM COATED ORAL at 20:08

## 2018-01-08 RX ADMIN — LOSARTAN POTASSIUM 50 MG: 50 TABLET ORAL at 08:17

## 2018-01-08 RX ADMIN — FUROSEMIDE 20 MG: 20 TABLET ORAL at 08:17

## 2018-01-08 RX ADMIN — POTASSIUM CHLORIDE 10 MEQ: 750 TABLET, FILM COATED, EXTENDED RELEASE ORAL at 08:17

## 2018-01-08 RX ADMIN — CEPHALEXIN 500 MG: 500 CAPSULE ORAL at 07:00

## 2018-01-08 RX ADMIN — AMLODIPINE BESYLATE 5 MG: 5 TABLET ORAL at 13:14

## 2018-01-08 RX ADMIN — DOCUSATE SODIUM 100 MG: 100 CAPSULE, LIQUID FILLED ORAL at 20:08

## 2018-01-08 RX ADMIN — CEPHALEXIN 500 MG: 500 CAPSULE ORAL at 13:14

## 2018-01-08 RX ADMIN — DOCUSATE SODIUM 100 MG: 100 CAPSULE, LIQUID FILLED ORAL at 15:53

## 2018-01-08 RX ADMIN — POTASSIUM CHLORIDE 10 MEQ: 750 TABLET, FILM COATED, EXTENDED RELEASE ORAL at 20:08

## 2018-01-08 RX ADMIN — RIVAROXABAN 15 MG: 15 TABLET, FILM COATED ORAL at 08:17

## 2018-01-08 RX ADMIN — LOSARTAN POTASSIUM 50 MG: 50 TABLET ORAL at 20:08

## 2018-01-08 RX ADMIN — NYSTATIN AND TRIAMCINOLONE ACETONIDE: 100000; 1 CREAM TOPICAL at 08:18

## 2018-01-08 RX ADMIN — CEPHALEXIN 500 MG: 500 CAPSULE ORAL at 21:47

## 2018-01-08 RX ADMIN — PANTOPRAZOLE SODIUM 40 MG: 40 TABLET, DELAYED RELEASE ORAL at 07:01

## 2018-01-08 RX ADMIN — METOPROLOL SUCCINATE 25 MG: 25 TABLET, FILM COATED, EXTENDED RELEASE ORAL at 08:17

## 2018-01-08 ASSESSMENT — PAIN SCALES - GENERAL
PAINLEVEL_OUTOF10: 0

## 2018-01-08 NOTE — PROGRESS NOTES
Patient's son Leah Taylor and daughter-in-law Neela Huggins requested to meet with . Leah Taylor and Neela Huggins inform this  that patient will need more help at home upon DC. This  provided family with resources on non-skilled home health agencies, along with reviewing PASSPORT services. Family desires for SS to discuss PASSPORT services with patient. It is worth noting that this  did leave voice mail with patient's area office on aging  to inquire about more help at home that patient is eligable for. SS to continue to follow.

## 2018-01-08 NOTE — PROGRESS NOTES
Nutrition Assessment    Type and Reason for Visit: Initial, Consult (subacute admit with dx of R leg cellulitis for weakness.)    Nutrition Recommendations: Continue current diet    Malnutrition Assessment:  · Malnutrition Status: No malnutrition  · Context: Acute illness or injury  · Findings of the 6 clinical characteristics of malnutrition (Minimum of 2 out of 6 clinical characteristics is required to make the diagnosis of moderate or severe Protein Calorie Malnutrition based on AND/ASPEN Guidelines):  1. Energy Intake-51% to 75%, greater than or equal to 5 days    2. Weight Loss-No significant weight loss,    3. Fat Loss-No significant subcutaneous fat loss,    4. Muscle Loss-Severe muscle mass loss, Clavicles (pectoralis and deltoids), Interosseous, Scapula (trapezius) (Pt states she has always been thin and denies be thinner now. Anticipate some loss is age related.)  5. Fluid Accumulation-Mild fluid accumulation, Extremities    Nutrition Diagnosis:   · Problem: No nutrition diagnosis at this time    Nutrition Assessment:  · Subjective Assessment: Pt visited in her room during lunch. She reports her appetite is good and she ate > 75% of her meal.  She reports her weight is stable and she has always been thin. She has no nutrition concerns at this time. · Nutrition-Focused Physical Findings: RLE edema charted. Last BM 1/7. Skin intact, but appears thin and dry.   · Wound Type: None  · Current Nutrition Therapies:  · Oral Diet Orders: General   · Oral Diet intake: %, 51-75%  · Oral Nutrition Supplement (ONS) Orders: None  · Anthropometric Measures:  · Ht: 4' 11\" (149.9 cm)   · Current Body Wt: 109 lb 9.1 oz (49.7 kg)  · Admission Body Wt: 109 lb 9.1 oz (49.7 kg)  · Usual Body Wt: 112 lb (50.8 kg) (1/4/18, 113# 11/16 charted)  · Ideal Body Wt: 100 lb (45.4 kg), % Ideal Body > 100%   · BMI Classification: BMI 18.5 - 24.9 Normal Weight     · Comparative Standards (Estimated Nutrition Needs):  · Estimated Daily Total Kcal: 8521-0417  · Estimated Daily Protein (g): 50-60    Estimated Intake vs Estimated Needs: Intake Meets Needs    Nutrition Risk Level: Low    Nutrition Interventions:   Continue current diet  Continued Inpatient Monitoring, Education not appropriate at this time    Nutrition Evaluation:   · Evaluation: Goals set   · Goals: po intake > 75% of meals. Maintain weight in UBW range. · Monitoring: Meal Intake, Skin Integrity, Ascites/Edema, Weight, Pertinent Labs, Constipation    See Adult Nutrition Doc Flowsheet for more detail.      Electronically signed by Whitney Ibrahim RD, LD on 1/8/18 at 1:40 PM

## 2018-01-08 NOTE — PLAN OF CARE
Problem: Nutrition  Goal: Optimal nutrition therapy  Nutrition Problem: No nutrition diagnosis at this time  Intervention: Food and/or Nutrient Delivery: Continue current diet  Nutritional Goals: po intake > 75% of meals. Maintain weight in UBW range.    Outcome: Ongoing

## 2018-01-08 NOTE — PROGRESS NOTES
Chart reviewed. Patient's care discussed in daily quality rounds. Patient was admitted to [facility] with right leg cellulitis and then transferred to subacute for skilled therapies. This  met with patient to complete psychosocial assessment and DC planning. Upon visit patient is alert and sitting in recliner chair. Patient appears well groomed and dressed in clean, weather appropriate clothing from home. Patient is alert and oriented to person, place, and situation. Patient's thought process appears intact. Patient's mood is calm and cooperative. Patient reports residing at home with son and grand-son. Patient reports that son assists with patient's grocery shopping, transportation to doctor appointments, etc.  Patient reports receiving home delivered meals. Patient reports no history or current mental health/substance abuse issues. Patient reports no current thoughts of suicide and no current thoughts to harm others. Patient reports feeling safe and well supported at home. Patient reports desire to return home with family, and \" all I need is someone to help me with bathing. \"  Patient reports that she has a  through [facility] office on aging however reports not being able to recall 's first name, \" I think her last name is Brittni Marcus. \"  This  contacted [facility] Office on Aging with patient's verbal permission. This  was informed that Avanell Channel #[phone] is patient's assigned . This  left voice mail for Rafita Castro to contact  to discuss patient's services available at home. This  also invited Rafita Castro to patient's care conference this Wednesday 1/10/2018 at 1pm.  SS to continue to follow.

## 2018-01-08 NOTE — PROGRESS NOTES
Individual Concurrent Group Co-treatment   Time In  415         Time Out  500         Minutes [address] [facility]  License and [address] Number: [id number]

## 2018-01-08 NOTE — PROGRESS NOTES
grab bars proprerly        Bed mobility  Supine to Sit: Supervision (HOB elevated at 45*)  Scooting: Stand by assistance  Transfers  Stand Step Transfers: Contact guard assistance                       LUE AROM (degrees)  LUE AROM : Exceptions  L Shoulder Flexion 0-180: ~ 0-80* (\"been that way for a long time\")  RUE AROM (degrees)  RUE AROM : Exceptions  R Shoulder Flexion 0-180: ~ 0-70* (\"been that way for a long time\")  LUE Strength  Gross LUE Strength: Exceptions to [facility]  RUE Strength  Gross RUE Strength: Exceptions to [facility]                  Assessment   Performance deficits / Impairments: Decreased functional mobility ; Decreased ADL status; Decreased ROM; Decreased strength;Decreased endurance;Decreased balance;Decreased high-level IADLs  Prognosis: Good  Decision Making: Low Complexity  Discharge Recommendations: Home with assist PRN (supportive son)  REQUIRES OT FOLLOW UP: Yes  OT Equipment Recommendations  Equipment Needed: No  Other: has necessary AE        Discharge Recommendations:  Home with assist PRN (supportive son)     Plan   Plan  Times per week: 3-6  Plan weeks: 1  Current Treatment Recommendations: Strengthening, ROM, Balance Training, Functional Mobility Training, Endurance Training, Safety Education & Training, Patient/Caregiver Education & Training, Neuromuscular Re-education, Self-Care / ADL    G-Code  OT G-codes  Functional Limitation: Self care  Self Care Current Status (): At least 60 percent but less than 80 percent impaired, limited or restricted  Self Care Goal Status (): At least 20 percent but less than 40 percent impaired, limited or restricted  OutComes Score                                           AM-PAC Score             Goals  Short term goals  Time Frame for Short term goals: 1 wk  Short term goal 1: Increase to MI UB dressing, CGA LB dressing (aside from L ankle brace- needs assist to don)  Short term goal 2:  Increase to MI toileting tasks  Short term goal 3: Tolerate BUE

## 2018-01-09 PROCEDURE — 1200000000 HC SEMI PRIVATE

## 2018-01-09 PROCEDURE — 97116 GAIT TRAINING THERAPY: CPT

## 2018-01-09 PROCEDURE — 97535 SELF CARE MNGMENT TRAINING: CPT

## 2018-01-09 PROCEDURE — 97530 THERAPEUTIC ACTIVITIES: CPT

## 2018-01-09 PROCEDURE — 97110 THERAPEUTIC EXERCISES: CPT

## 2018-01-09 PROCEDURE — 6370000000 HC RX 637 (ALT 250 FOR IP): Performed by: INTERNAL MEDICINE

## 2018-01-09 RX ADMIN — SILVER SULFADIAZINE: 10 CREAM TOPICAL at 20:47

## 2018-01-09 RX ADMIN — PANTOPRAZOLE SODIUM 40 MG: 40 TABLET, DELAYED RELEASE ORAL at 05:30

## 2018-01-09 RX ADMIN — FUROSEMIDE 20 MG: 20 TABLET ORAL at 08:26

## 2018-01-09 RX ADMIN — LOSARTAN POTASSIUM 50 MG: 50 TABLET ORAL at 08:26

## 2018-01-09 RX ADMIN — NYSTATIN AND TRIAMCINOLONE ACETONIDE: 100000; 1 CREAM TOPICAL at 08:27

## 2018-01-09 RX ADMIN — METOPROLOL SUCCINATE 25 MG: 25 TABLET, FILM COATED, EXTENDED RELEASE ORAL at 08:26

## 2018-01-09 RX ADMIN — POTASSIUM CHLORIDE 10 MEQ: 750 TABLET, FILM COATED, EXTENDED RELEASE ORAL at 20:47

## 2018-01-09 RX ADMIN — LOSARTAN POTASSIUM 50 MG: 50 TABLET ORAL at 20:47

## 2018-01-09 RX ADMIN — CEPHALEXIN 500 MG: 500 CAPSULE ORAL at 20:47

## 2018-01-09 RX ADMIN — CEPHALEXIN 500 MG: 500 CAPSULE ORAL at 14:24

## 2018-01-09 RX ADMIN — AMLODIPINE BESYLATE 5 MG: 5 TABLET ORAL at 08:26

## 2018-01-09 RX ADMIN — RIVAROXABAN 15 MG: 15 TABLET, FILM COATED ORAL at 08:26

## 2018-01-09 RX ADMIN — NYSTATIN AND TRIAMCINOLONE ACETONIDE: 100000; 1 CREAM TOPICAL at 20:48

## 2018-01-09 RX ADMIN — DOCUSATE SODIUM 100 MG: 100 CAPSULE, LIQUID FILLED ORAL at 20:47

## 2018-01-09 RX ADMIN — CEPHALEXIN 500 MG: 500 CAPSULE ORAL at 05:30

## 2018-01-09 RX ADMIN — POTASSIUM CHLORIDE 10 MEQ: 750 TABLET, FILM COATED, EXTENDED RELEASE ORAL at 08:26

## 2018-01-09 RX ADMIN — ATORVASTATIN CALCIUM 40 MG: 40 TABLET, FILM COATED ORAL at 20:47

## 2018-01-09 ASSESSMENT — PAIN SCALES - GENERAL
PAINLEVEL_OUTOF10: 0

## 2018-01-09 NOTE — PROGRESS NOTES
Rolling Walker  Activity: To/from bathroom  Assist Level: Stand by assistance  Toilet Transfers  Toilet Transfer: Stand by assistance  Bed mobility  Rolling to Right: Modified independent (using bed rail, HOB elevated)  Supine to Sit: Supervision (bed rail, HOB elevated)  Scooting: Stand by assistance  Transfers  Stand Step Transfers: Contact guard assistance  Transfer Comments: w/c -> room chair                          Functional Activity Tolerance  Functional Activity Tolerance: Tolerates 10 - 20 min exercise with multiple rests  Additional Comments: Dyn seated ther act balloon volley with wand- alternating BUE, to increase dyn seated анна/bal and coordination/endurance for ADL                 Type of ROM/Therapeutic Exercise  Type of ROM/Therapeutic Exercise: AROM  Comment: BUE tabletop peg ther act to increase FM skills, ROM, and coordination for ADL                    Assessment   Performance deficits / Impairments: Decreased functional mobility ; Decreased ADL status; Decreased ROM; Decreased strength;Decreased endurance;Decreased balance;Decreased high-level IADLs  Prognosis: Good  Discharge Recommendations: Home with assist PRN (supportive son)  REQUIRES OT FOLLOW UP: Yes       Discharge Recommendations:  Home with assist PRN (supportive son)     Plan   Plan  Times per week: 3-6  Plan weeks: 1  Current Treatment Recommendations: Strengthening, ROM, Balance Training, Functional Mobility Training, Endurance Training, Safety Education & Training, Patient/Caregiver Education & Training, Neuromuscular Re-education, Self-Care / ADL  G-Code     OutComes Score                                           AM-PAC Score             Goals  Short term goals  Time Frame for Short term goals: 1 wk  Short term goal 1: Increase to MI UB dressing, CGA LB dressing (aside from L ankle brace- needs assist to don)  Short term goal 2:  Increase to MI toileting tasks  Short term goal 3: Tolerate BUE ther ex/ther act [id number] prior to

## 2018-01-09 NOTE — PROGRESS NOTES
Physical Therapy  Facility/Department: [facility] MED SURG UNIT  Daily Treatment Note  NAME: Sommer Chance  : 10/9/1926  MRN: [id number]    Date of Service: 2018    Patient Diagnosis(es):   Patient Active Problem List    Diagnosis Date Noted    Cellulitis of right leg 2018    Cellulitis and abscess of right lower extremity 2018    Bilateral carotid artery stenosis 10/27/2017    Central retinal artery occlusion, right eye 10/17/2017    Vision loss of right eye 10/17/2017    Status post total replacement of right hip 2017    Varicose veins of both lower extremities 2017    Bruit of left carotid artery 2017    Atrial fibrillation ([address] 2014    HTN (hypertension) 2013    Peripheral edema 2013    Vitamin D deficiency     Dyslipidemia     Osteoporosis     Chronic constipation     Neurodermatitis     Chronic venous insufficiency     Anxiety     Seronegative rheumatoid arthritis ([address]        Past Medical History:   Diagnosis Date    Anxiety     Atrial fibrillation ([address]     Bilateral carotid artery stenosis 10/27/2017    Central retinal artery occlusion, right eye 10/17/2017    Chronic constipation     Chronic venous insufficiency     Dyslipidemia     Essential hypertension     HTN (hypertension) 2013    Neurodermatitis     chronic, of bilateral lower extremities    Osteoporosis     Pap test, as part of routine gynecological examination     years ago   Chelsea Pisano Screening mammogram     about 5 yrs ago    Seronegative rheumatoid arthritis ([address]     Status post total replacement of right hip 2017    Vision loss of right eye 10/17/2017    Overview:  - 80year old female who presents with PMH of HTN,AFIB(declined AC/aspirin),HLP presented to OSH yesterday evening with C/O right eye vision loss around 5 pm yesterday. Painless. - Had CT head without contrast at OSH ED that shows: No acute intracranial abnormality.  A few scattered punctate Functional Mobility Training, Transfer Training  Safety Devices  Type of devices:  All fall risk precautions in place  Restraints  Initially in place: Yes     Therapy Time   Individual Concurrent Group Co-treatment   Time In  330         Time Out  415         Minutes  [facility], [facility] and [facility] 33 Number: [id number]

## 2018-01-10 LAB
BLOOD CULTURE, ROUTINE: ABNORMAL
BLOOD CULTURE, ROUTINE: ABNORMAL
CULTURE, BLOOD 2: NORMAL
ORGANISM: ABNORMAL

## 2018-01-10 PROCEDURE — 97110 THERAPEUTIC EXERCISES: CPT

## 2018-01-10 PROCEDURE — 97530 THERAPEUTIC ACTIVITIES: CPT

## 2018-01-10 PROCEDURE — 1200000000 HC SEMI PRIVATE

## 2018-01-10 PROCEDURE — 6370000000 HC RX 637 (ALT 250 FOR IP): Performed by: INTERNAL MEDICINE

## 2018-01-10 PROCEDURE — 97535 SELF CARE MNGMENT TRAINING: CPT

## 2018-01-10 PROCEDURE — 97116 GAIT TRAINING THERAPY: CPT

## 2018-01-10 RX ORDER — DIPHENHYDRAMINE HCL 25 MG
25 TABLET ORAL EVERY 6 HOURS PRN
Status: DISCONTINUED | OUTPATIENT
Start: 2018-01-10 | End: 2018-01-14 | Stop reason: HOSPADM

## 2018-01-10 RX ORDER — DIPHENHYDRAMINE HCL 25 MG
25 TABLET ORAL ONCE
Status: COMPLETED | OUTPATIENT
Start: 2018-01-10 | End: 2018-01-10

## 2018-01-10 RX ADMIN — POTASSIUM CHLORIDE 10 MEQ: 750 TABLET, FILM COATED, EXTENDED RELEASE ORAL at 20:50

## 2018-01-10 RX ADMIN — RIVAROXABAN 15 MG: 15 TABLET, FILM COATED ORAL at 09:25

## 2018-01-10 RX ADMIN — DIPHENHYDRAMINE HCL 25 MG: 25 TABLET ORAL at 20:50

## 2018-01-10 RX ADMIN — POTASSIUM CHLORIDE 10 MEQ: 750 TABLET, FILM COATED, EXTENDED RELEASE ORAL at 09:25

## 2018-01-10 RX ADMIN — SILVER SULFADIAZINE: 10 CREAM TOPICAL at 20:51

## 2018-01-10 RX ADMIN — NYSTATIN AND TRIAMCINOLONE ACETONIDE: 100000; 1 CREAM TOPICAL at 20:51

## 2018-01-10 RX ADMIN — CEPHALEXIN 500 MG: 500 CAPSULE ORAL at 05:52

## 2018-01-10 RX ADMIN — ATORVASTATIN CALCIUM 40 MG: 40 TABLET, FILM COATED ORAL at 20:49

## 2018-01-10 RX ADMIN — CEPHALEXIN 500 MG: 500 CAPSULE ORAL at 20:49

## 2018-01-10 RX ADMIN — DIPHENHYDRAMINE HCL 25 MG: 25 TABLET ORAL at 11:43

## 2018-01-10 RX ADMIN — CEPHALEXIN 500 MG: 500 CAPSULE ORAL at 15:09

## 2018-01-10 RX ADMIN — DOCUSATE SODIUM 100 MG: 100 CAPSULE, LIQUID FILLED ORAL at 20:49

## 2018-01-10 RX ADMIN — PANTOPRAZOLE SODIUM 40 MG: 40 TABLET, DELAYED RELEASE ORAL at 05:52

## 2018-01-10 RX ADMIN — FUROSEMIDE 20 MG: 20 TABLET ORAL at 09:24

## 2018-01-10 ASSESSMENT — PAIN SCALES - GENERAL
PAINLEVEL_OUTOF10: 0

## 2018-01-10 NOTE — PROGRESS NOTES
appropriate, normal insight  Capillary Refill: Brisk,< 3 seconds   Peripheral Pulses: +2 palpable, equal bilaterally       Labs:   Recent Labs      01/08/18   0546   WBC  7.9   HGB  14.1   HCT  42.2   PLT  252     Recent Labs      01/08/18   0546   NA  135   K  4.9   CL  96*   CO2  22   BUN  21   CREATININE  0.63   CALCIUM  9.5     No results for input(s): AST, ALT, BILIDIR, BILITOT, ALKPHOS in the last 72 hours. No results for input(s): INR in the last 72 hours. No results for input(s): Radha Martinet in the last 72 hours.     Urinalysis:      Lab Results   Component Value Date    NITRU Negative 11/19/2013    WBCUA 0-2 11/19/2013    BACTERIA Rare 11/19/2013    RBCUA 0-2 11/19/2013    BLOODU SMALL 11/19/2013    SPECGRAV 1.015 11/19/2013    GLUCOSEU Negative 11/19/2013       Radiology:  No orders to display           Assessment/Plan:    Active Hospital Problems    Diagnosis Date Noted    Cellulitis of right leg [L03.115] 01/07/2018         DVT Prophylaxis: xarelto  Diet: DIET GENERAL;  Code Status: Full Code    PT/OT Eval Status: done    Dispo - R lower leg cellulitis/dermatitis- better, continue with Po atbs and local Tx per podiatry   HTN- better controlled, added norvasc 5 mg   Leg itchiness- add benadryl PRN  A fib- chronic, rate controlled, full anticoagulation  Weakness- rehab orders initiated  One episode of diarrhea on admission- resolved   Stable for admission at [facility] for subacute status       Electronically signed by Mariella Eagle MD on 1/10/2018 at 8:50 AM

## 2018-01-10 NOTE — PROGRESS NOTES
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 - EKG shows sinus bradycardia with PAC(R-58) - Concern for retinal artery occlusion?> 12 hours have passed. - Afib related? Carotid bruit? - BP was 210/85 initially and then came down to 160/69 -Normal CRP - No other acute neuro deficit seen. -Spoke to Optho on BPRO([id number])- alon Villalta and he will arrange for pt to be seen today at [facility]. No acute intervention needed as pt well out of time frame(as per optho). Plan: - Admit to [facility] consult. Await optho input and consider further rx options after etiology is confirmed. - Fall precautions - Neuro checks - ECHO - Carotid doppler  Will monitor.  Vitamin D deficiency      Past Surgical History:   Procedure Laterality Date    TOTAL HIP ARTHROPLASTY Right 2009       Restrictions  Restrictions/Precautions  Restrictions/Precautions: Fall Risk (lost vision R eye Oct 2017)  Required Braces or Orthoses?: Yes (L AFO prn for comfort)  Implants present? : Metal implants (R THR)  Required Braces or Orthoses  Left Lower Extremity Brace: Loli [employer]  Position Activity Restriction  Other position/activity restrictions: Pt's son has brought in the L AFO  Subjective    Objective    Interdisciplinary Care Conference this date at bedside due to patient fatigue. See separate note for full report. Assessment        Steady progress in mobiity and endurance noted. REcommend increased aide assist if possible 2-4 hours 3x per week.       Discharge Recommendations:  Home with assist PRN    G-Code     OutComes Score                                                    AM-PAC Score             Goals  Short term goals  Time Frame for Short term goals: 2-4 days  Short term goal 1: Indep transitions  Short term goal 2: Ambulate with ww with kicking own feet together with SBA  Short term goal 3: Perfrom steps with HR with SBA  Short term goal 4: tolerte 2x10 reps LE seated LUDIN  Long term goals  Time Frame

## 2018-01-10 NOTE — PROGRESS NOTES
Chart reviewed. Multidisciplinary team met with patient and patient's Waiver  Karel Duckworth to discuss patient's care and DC planning. Upon visit patient is alert and laying in hospital bed. Patient reported feeling fatigue after participating in therapy. Patient is reported to be progressing in therapy and has a tentative DC date of 1/14/18. Patient plans to DC home with son and [facility] along with waiver services. [facility] reports that nursing and aid services can be increased upon patient's DC home. Patient reports being agreeable with DC home with [facility] and increased  Waiver services. [facility] also reports that patient will continue to have home delivered meals along with a medical alert system. Patient's daughter-in-law Dimple Share requested to be called after patient's care conference with update. Patient gives this  verbal permission to contact S-I-KATEY Licea and son Maria Ines Peters via phone. This  contacted [facility] and provided update of above. Maria Ines Peters and Bernice Licea thank this  for update and report feeling comfortable with patient returning home with extra supports. [facility] is agency identified. Freedom of choice provided to patient and family. This  collaborated with [address] [facility] liaison with new referral [facility]. ASHLEE completed. SS to continue to follow and assist in coordinating DC.

## 2018-01-10 NOTE — PROGRESS NOTES
Needs):  · Estimated Daily Total Kcal: 2012-8678  · Estimated Daily Protein (g): 50-60    Estimated Intake vs Estimated Needs: Intake Less Than Needs    Nutrition Risk Level: Moderate    Nutrition Interventions:   Modify current diet, Start ONS  Continued Inpatient Monitoring    Nutrition Evaluation:   · Evaluation: No progress toward goals   · Goals: po intake > 75% meals & supplements. Maintain weight in UBW range. · Monitoring: Meal Intake, Supplement Intake, Diet Tolerance, Weight, Ascites/Edema, Pertinent Labs, Constipation    See Adult Nutrition Doc Flowsheet for more detail.      Electronically signed by Robert Swann RD, LD on 1/10/18 at 3:03 PM

## 2018-01-10 NOTE — PROGRESS NOTES
of Gait: good pace  Distance: 90' x 2 with standing RB between gait trials  Comments: wears L ankle brace  Ambulation 2  Surface - 2: level tile  Device 2: Rolling Walker  Assistance 2: Contact guard assistance;Minimal assistance  Quality of Gait 2: small WILFRIDO, better pace with practice, walks on toes with inverted foot left from old injury  Distance: 80' and 40'  standing RB between trials        Exercises  Hamstring Sets: YTB H3 2 x 10   Hip Flexion: x 20 alternating  Knee Long Arc Quad: x 10 H3   Ankle Pumps: 20x                        Assessment   Body structures, Functions, Activity limitations: Decreased functional mobility   Assessment: Pt tolerated inc hold times and resistance with ther ex with no c/o pain, just fatigue. Pt returned to room to sit up in recliner with call light and chair alarm in place. Continue to progress as tolerated. Treatment Diagnosis: difficulty walking and LE weakness with RLE cellulitis  REQUIRES PT FOLLOW UP: Yes  Activity Tolerance  Activity Tolerance: Patient Tolerated treatment well  PT Equipment Recommendations  Equipment Needed: No       Discharge Recommendations:  Home with assist PRN    G-Code     OutComes Score                                                    AM-PAC Score             Goals  Short term goals  Time Frame for Short term goals: 2-4 days  Short term goal 1: Indep transitions  Short term goal 2: Ambulate with ww with kicking own feet together with SBA  Short term goal 3: Perfrom steps with HR with SBA  Short term goal 4: tolerte 2x10 reps LE seated LUDIN  Long term goals  Time Frame for Long term goals : 4-7 days  Long term goal 1: I transtions  Long term goal 2:  Indep amb with ww  Long term goal 3: Steps with HR and SBA  Long term goal 4: Establish HEP  Patient Goals   Patient goals : Go home soon    Plan    Plan  Times per week:  (5-7)  Times per day:  (1-2)  Plan weeks: 1 week  Current Treatment Recommendations: Strengthening, Functional Mobility Training,

## 2018-01-11 LAB
ANION GAP SERPL CALCULATED.3IONS-SCNC: 12 MEQ/L (ref 7–13)
BASOPHILS ABSOLUTE: 0.1 K/UL (ref 0–0.2)
BASOPHILS RELATIVE PERCENT: 0.9 %
BUN BLDV-MCNC: 33 MG/DL (ref 8–23)
CALCIUM SERPL-MCNC: 9.6 MG/DL (ref 8.6–10.2)
CHLORIDE BLD-SCNC: 97 MEQ/L (ref 98–107)
CO2: 24 MEQ/L (ref 22–29)
CREAT SERPL-MCNC: 0.68 MG/DL (ref 0.5–0.9)
EOSINOPHILS ABSOLUTE: 0.4 K/UL (ref 0–0.7)
EOSINOPHILS RELATIVE PERCENT: 6 %
GFR AFRICAN AMERICAN: >60
GFR NON-AFRICAN AMERICAN: >60
GLUCOSE BLD-MCNC: 88 MG/DL (ref 74–109)
HCT VFR BLD CALC: 40 % (ref 37–47)
HEMOGLOBIN: 13.4 G/DL (ref 12–16)
LYMPHOCYTES ABSOLUTE: 1.4 K/UL (ref 1–4.8)
LYMPHOCYTES RELATIVE PERCENT: 20.4 %
MCH RBC QN AUTO: 32.6 PG (ref 27–31.3)
MCHC RBC AUTO-ENTMCNC: 33.6 % (ref 33–37)
MCV RBC AUTO: 97.2 FL (ref 82–100)
MONOCYTES ABSOLUTE: 1.1 K/UL (ref 0.2–0.8)
MONOCYTES RELATIVE PERCENT: 15.7 %
NEUTROPHILS ABSOLUTE: 4 K/UL (ref 1.4–6.5)
NEUTROPHILS RELATIVE PERCENT: 57 %
PDW BLD-RTO: 13.5 % (ref 11.5–14.5)
PLATELET # BLD: 224 K/UL (ref 130–400)
POTASSIUM SERPL-SCNC: 5.1 MEQ/L (ref 3.5–5.1)
RBC # BLD: 4.12 M/UL (ref 4.2–5.4)
SODIUM BLD-SCNC: 133 MEQ/L (ref 132–144)
WBC # BLD: 7 K/UL (ref 4.8–10.8)

## 2018-01-11 PROCEDURE — 6370000000 HC RX 637 (ALT 250 FOR IP): Performed by: INTERNAL MEDICINE

## 2018-01-11 PROCEDURE — 97530 THERAPEUTIC ACTIVITIES: CPT

## 2018-01-11 PROCEDURE — 97116 GAIT TRAINING THERAPY: CPT

## 2018-01-11 PROCEDURE — 36415 COLL VENOUS BLD VENIPUNCTURE: CPT

## 2018-01-11 PROCEDURE — 85025 COMPLETE CBC W/AUTO DIFF WBC: CPT

## 2018-01-11 PROCEDURE — 80048 BASIC METABOLIC PNL TOTAL CA: CPT

## 2018-01-11 PROCEDURE — 97535 SELF CARE MNGMENT TRAINING: CPT

## 2018-01-11 PROCEDURE — 1200000000 HC SEMI PRIVATE

## 2018-01-11 PROCEDURE — 97110 THERAPEUTIC EXERCISES: CPT

## 2018-01-11 RX ADMIN — DOCUSATE SODIUM 100 MG: 100 CAPSULE, LIQUID FILLED ORAL at 08:05

## 2018-01-11 RX ADMIN — METOPROLOL SUCCINATE 25 MG: 25 TABLET, FILM COATED, EXTENDED RELEASE ORAL at 08:01

## 2018-01-11 RX ADMIN — ATORVASTATIN CALCIUM 40 MG: 40 TABLET, FILM COATED ORAL at 19:54

## 2018-01-11 RX ADMIN — LOSARTAN POTASSIUM 50 MG: 50 TABLET ORAL at 19:54

## 2018-01-11 RX ADMIN — DOCUSATE SODIUM 100 MG: 100 CAPSULE, LIQUID FILLED ORAL at 19:54

## 2018-01-11 RX ADMIN — AMLODIPINE BESYLATE 5 MG: 5 TABLET ORAL at 08:03

## 2018-01-11 RX ADMIN — CEPHALEXIN 500 MG: 500 CAPSULE ORAL at 06:50

## 2018-01-11 RX ADMIN — SILVER SULFADIAZINE: 10 CREAM TOPICAL at 19:54

## 2018-01-11 RX ADMIN — POTASSIUM CHLORIDE 10 MEQ: 750 TABLET, FILM COATED, EXTENDED RELEASE ORAL at 08:05

## 2018-01-11 RX ADMIN — NYSTATIN AND TRIAMCINOLONE ACETONIDE: 100000; 1 CREAM TOPICAL at 20:33

## 2018-01-11 RX ADMIN — CEPHALEXIN 500 MG: 500 CAPSULE ORAL at 13:53

## 2018-01-11 RX ADMIN — POTASSIUM CHLORIDE 10 MEQ: 750 TABLET, FILM COATED, EXTENDED RELEASE ORAL at 19:54

## 2018-01-11 RX ADMIN — PANTOPRAZOLE SODIUM 40 MG: 40 TABLET, DELAYED RELEASE ORAL at 06:50

## 2018-01-11 RX ADMIN — NYSTATIN AND TRIAMCINOLONE ACETONIDE: 100000; 1 CREAM TOPICAL at 08:06

## 2018-01-11 RX ADMIN — ACETAMINOPHEN 650 MG: 325 TABLET ORAL at 15:01

## 2018-01-11 RX ADMIN — CEPHALEXIN 500 MG: 500 CAPSULE ORAL at 20:33

## 2018-01-11 RX ADMIN — RIVAROXABAN 15 MG: 15 TABLET, FILM COATED ORAL at 08:01

## 2018-01-11 RX ADMIN — LOSARTAN POTASSIUM 50 MG: 50 TABLET ORAL at 08:01

## 2018-01-11 RX ADMIN — FUROSEMIDE 20 MG: 20 TABLET ORAL at 08:05

## 2018-01-11 ASSESSMENT — PAIN SCALES - GENERAL
PAINLEVEL_OUTOF10: 0
PAINLEVEL_OUTOF10: 3
PAINLEVEL_OUTOF10: 4
PAINLEVEL_OUTOF10: 0
PAINLEVEL_OUTOF10: 0
PAINLEVEL_OUTOF10: 5
PAINLEVEL_OUTOF10: 0
PAINLEVEL_OUTOF10: 2
PAINLEVEL_OUTOF10: 0

## 2018-01-11 ASSESSMENT — PAIN DESCRIPTION - ORIENTATION
ORIENTATION: RIGHT
ORIENTATION: LEFT

## 2018-01-11 ASSESSMENT — PAIN DESCRIPTION - DESCRIPTORS
DESCRIPTORS: ACHING

## 2018-01-11 ASSESSMENT — PAIN DESCRIPTION - LOCATION
LOCATION: SHOULDER
LOCATION: WRIST
LOCATION: SHOULDER;HAND

## 2018-01-11 ASSESSMENT — PAIN DESCRIPTION - FREQUENCY: FREQUENCY: INTERMITTENT

## 2018-01-11 ASSESSMENT — PAIN DESCRIPTION - PAIN TYPE
TYPE: ACUTE PAIN;OTHER (COMMENT)
TYPE: ACUTE PAIN

## 2018-01-11 NOTE — PROGRESS NOTES
(hand and arm squeezes with BUE with therapy balls 15x)                    Assessment      REQUIRES OT FOLLOW UP: Yes       Discharge Recommendations:  Home with assist PRN (supportive son)     Plan   Plan  Times per week: 3-6  Plan weeks: 1  Current Treatment Recommendations: Strengthening, ROM, Balance Training, Functional Mobility Training, Endurance Training, Safety Education & Training, Patient/Caregiver Education & Training, Neuromuscular Re-education, Self-Care / ADL  G-Code     OutComes Score                                           AM-PAC Score             Goals  Short term goals  Time Frame for Short term goals: 1 wk  Short term goal 1: Increase to MI UB dressing, CGA LB dressing (aside from L ankle brace- needs assist to don)  Short term goal 2: Increase to MI toileting tasks  Short term goal 3: Tolerate BUE ther ex/ther act [id number] prior to fatigue to increase strength/endurance for dressing tasks  Short term goal 4: Increase to 5-7min standing анна/endurance to increase safety and I with ADL  Short term goal 5: Increase to MI G/H tasks  Long term goals  Time Frame for Long term goals : 1 wk  Long term goal 1: Same as STG  Long term goal 2: Same as STG   Long term goal 3:  Increase to MI BUE HEP for cont'd strengthening for ADL  Long term goal 4: Same as STG  Long term goal 5: Same as STG  Patient Goals   Patient goals : Return home       Therapy Time   Individual Concurrent Group Co-treatment   Time In  11:00am         Time Out  11:40         Minutes  [address], [address] Number: [id number]

## 2018-01-11 NOTE — PROGRESS NOTES
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 - EKG shows sinus bradycardia with PAC(R-58) - Concern for retinal artery occlusion?> 12 hours have passed. - Afib related? Carotid bruit? - BP was 210/85 initially and then came down to 160/69 -Normal CRP - No other acute neuro deficit seen. -Spoke to Optho on MICHAEL([id number])- alon Villalta and he will arrange for pt to be seen today at [facility]. No acute intervention needed as pt well out of time frame(as per optho). Plan: - Admit to [facility] consult. Await optho input and consider further rx options after etiology is confirmed. - Fall precautions - Neuro checks - ECHO - Carotid doppler  Will monitor.  Vitamin D deficiency      Past Surgical History:   Procedure Laterality Date    TOTAL HIP ARTHROPLASTY Right 2009       Restrictions  Restrictions/Precautions  Restrictions/Precautions: Fall Risk  Required Braces or Orthoses?: Yes  Implants present? : Metal implants (R THR)  Required Braces or Orthoses  Left Lower Extremity Brace: Loli Foot [employer]otics  Position Activity Restriction  Other position/activity restrictions: Pt's son has brought in the L AFO  Subjective   General  Chart Reviewed: Yes  Additional Pertinent Hx: lost right eye sight in Oct 2017, skin reddended/ scrateched distl LE right worse than left, IV R wrist, no falls greater than a year ago  Family / Caregiver Present: No  Referring Practitioner: Dr Joshua Yi: Pt lying in bed and agreeable to therapy session. Pt states her R hand feels sore and B shoulders. She thinks it may be from some of the arm exercises done yesterday.    Pain Screening  Patient Currently in Pain: Yes  Pain Assessment  Pain Assessment: 0-10  Pain Level: 2  Pain Location: Shoulder;Hand (B shoulders, R hand)  Pain Descriptors: Aching  Vital Signs  Patient Currently in Pain: Yes       Orientation     Objective      Transfers  Sit to Stand: Stand by assistance  Stand to sit: Stand by assistance  Ambulation  Ambulation?: Yes  Ambulation 1  Surface: level tile  Device: Rolling Walker  Assistance: Stand by assistance  Quality of Gait: good pace, narrow WILFRIDO, VC's to widen WILFRIDO to decrease feet kicking one another. Distance: 145'   Comments: wears L ankle brace     Balance  Standing - Dynamic: Fair;+  Comments: pt stood x 5 min in bathroom and displays F+ dynamic standing balance for ADL's this morning reaching OBOS, across mid-line and mid level w/ 0 LOB and SBAx1. Pt diplays safety with wider WILFRIDO. Exercises  Hamstring Sets: x 20 H3 RTB   Gluteal Sets: x 20 H3  Hip Flexion: x 20 alternating  Hip Abduction: x 20 H3 RTB   Knee Long Arc Quad: x 20   Ankle Pumps: x 20   Other exercises  Other exercises?: Yes  Other exercises 1: x 20 H3                         Assessment Pt progressing with strengthening, balance and endurance this date. Pt able to stand for longer durations and ambulate farther before seated RB. Pt tolerated inc reps and resistance with there ex. No c/o increased pain after session. Pt left to work with OT. Continue to progress as tolerated.    Body structures, Functions, Activity limitations: Decreased functional mobility   Treatment Diagnosis: difficulty walking and LE weakness with RLE cellulitis  REQUIRES PT FOLLOW UP: Yes  Activity Tolerance  Activity Tolerance: Patient Tolerated treatment well  PT Equipment Recommendations  Equipment Needed: No       Discharge Recommendations:  Home with assist PRN    G-Code     OutComes Score                                                    AM-PAC Score             Goals  Short term goals  Time Frame for Short term goals: 2-4 days  Short term goal 1: Indep transitions  Short term goal 2: Ambulate with ww without kicking own feet together (wider WILFRIDO) with SBA  Short term goal 3: Perfrom steps with HR with SBA  Short term goal 4: tolerate 2x10 reps LE seated LUDIN  Long term goals  Time Frame for Long term goals : 4-7 days  Long term goal 1: I

## 2018-01-11 NOTE — PROGRESS NOTES
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 - EKG shows sinus bradycardia with PAC(R-58) - Concern for retinal artery occlusion?> 12 hours have passed. - Afib related? Carotid bruit? - BP was 210/85 initially and then came down to 160/69 -Normal CRP - No other acute neuro deficit seen. -Spoke to Optho on REE([id number])- alon Villalta and he will arrange for pt to be seen today at [facility]. No acute intervention needed as pt well out of time frame(as per optho). Plan: - Admit to [facility] consult. Await optho input and consider further rx options after etiology is confirmed. - Fall precautions - Neuro checks - ECHO - Carotid doppler  Will monitor.  Vitamin D deficiency      Past Surgical History:   Procedure Laterality Date    TOTAL HIP ARTHROPLASTY Right 2009       Restrictions  Restrictions/Precautions  Restrictions/Precautions: Fall Risk  Required Braces or Orthoses?: Yes  Implants present? : Metal implants (R THR )  Required Braces or Orthoses  Left Lower Extremity Brace: Loli Foot [employer]otics  Position Activity Restriction  Other position/activity restrictions: Pt's son has brought in the L AFO  Subjective   General  Chart Reviewed: Yes  Additional Pertinent Hx: lost right eye sight in Oct 2017, skin reddended/ scrateched distl LE right worse than left, IV R wrist, no falls greater than a year ago  Family / Caregiver Present: No  Referring Practitioner: Dr Lennox Simmons: Pt lying in bed this afternoon and states, \"I'm tired. \" Pt agreeable to therapy session, \"As long as it's not too strenuous\".    Pain Screening  Patient Currently in Pain: Denies  Pain Assessment  Pain Assessment: 0-10  Pain Level: 2  Pain Location: Shoulder;Hand (B shoulders, R hand)  Pain Descriptors: Aching  Vital Signs  Patient Currently in Pain: Denies       Orientation     Objective      Transfers  Sit to Stand: Stand by assistance  Stand to sit: Stand by

## 2018-01-12 PROCEDURE — 6370000000 HC RX 637 (ALT 250 FOR IP): Performed by: INTERNAL MEDICINE

## 2018-01-12 PROCEDURE — 97110 THERAPEUTIC EXERCISES: CPT

## 2018-01-12 PROCEDURE — 97112 NEUROMUSCULAR REEDUCATION: CPT

## 2018-01-12 PROCEDURE — 97116 GAIT TRAINING THERAPY: CPT

## 2018-01-12 PROCEDURE — 97530 THERAPEUTIC ACTIVITIES: CPT

## 2018-01-12 PROCEDURE — 1200000000 HC SEMI PRIVATE

## 2018-01-12 RX ADMIN — POTASSIUM CHLORIDE 10 MEQ: 750 TABLET, FILM COATED, EXTENDED RELEASE ORAL at 22:34

## 2018-01-12 RX ADMIN — CEPHALEXIN 500 MG: 500 CAPSULE ORAL at 14:07

## 2018-01-12 RX ADMIN — CEPHALEXIN 500 MG: 500 CAPSULE ORAL at 22:33

## 2018-01-12 RX ADMIN — NYSTATIN AND TRIAMCINOLONE ACETONIDE: 100000; 1 CREAM TOPICAL at 07:57

## 2018-01-12 RX ADMIN — ACETAMINOPHEN 650 MG: 325 TABLET ORAL at 22:34

## 2018-01-12 RX ADMIN — AMLODIPINE BESYLATE 5 MG: 5 TABLET ORAL at 07:57

## 2018-01-12 RX ADMIN — METOPROLOL SUCCINATE 25 MG: 25 TABLET, FILM COATED, EXTENDED RELEASE ORAL at 07:56

## 2018-01-12 RX ADMIN — RIVAROXABAN 15 MG: 15 TABLET, FILM COATED ORAL at 07:56

## 2018-01-12 RX ADMIN — NYSTATIN AND TRIAMCINOLONE ACETONIDE: 100000; 1 CREAM TOPICAL at 22:40

## 2018-01-12 RX ADMIN — LOSARTAN POTASSIUM 50 MG: 50 TABLET ORAL at 22:33

## 2018-01-12 RX ADMIN — DOCUSATE SODIUM 100 MG: 100 CAPSULE, LIQUID FILLED ORAL at 07:56

## 2018-01-12 RX ADMIN — FUROSEMIDE 20 MG: 20 TABLET ORAL at 07:57

## 2018-01-12 RX ADMIN — DOCUSATE SODIUM 100 MG: 100 CAPSULE, LIQUID FILLED ORAL at 22:34

## 2018-01-12 RX ADMIN — PANTOPRAZOLE SODIUM 40 MG: 40 TABLET, DELAYED RELEASE ORAL at 05:22

## 2018-01-12 RX ADMIN — POTASSIUM CHLORIDE 10 MEQ: 750 TABLET, FILM COATED, EXTENDED RELEASE ORAL at 07:57

## 2018-01-12 RX ADMIN — LOSARTAN POTASSIUM 50 MG: 50 TABLET ORAL at 07:56

## 2018-01-12 RX ADMIN — SILVER SULFADIAZINE: 10 CREAM TOPICAL at 22:40

## 2018-01-12 RX ADMIN — ATORVASTATIN CALCIUM 40 MG: 40 TABLET, FILM COATED ORAL at 22:34

## 2018-01-12 RX ADMIN — CEPHALEXIN 500 MG: 500 CAPSULE ORAL at 05:22

## 2018-01-12 ASSESSMENT — PAIN SCALES - GENERAL
PAINLEVEL_OUTOF10: 4
PAINLEVEL_OUTOF10: 5
PAINLEVEL_OUTOF10: 4
PAINLEVEL_OUTOF10: 0

## 2018-01-12 ASSESSMENT — ENCOUNTER SYMPTOMS
SHORTNESS OF BREATH: 0
NAUSEA: 0
DIARRHEA: 0
VOMITING: 0
COUGH: 0

## 2018-01-12 ASSESSMENT — PAIN DESCRIPTION - PAIN TYPE
TYPE: CHRONIC PAIN
TYPE: CHRONIC PAIN

## 2018-01-12 ASSESSMENT — PAIN DESCRIPTION - LOCATION
LOCATION: FOOT
LOCATION: FOOT;SHOULDER

## 2018-01-12 ASSESSMENT — PAIN DESCRIPTION - ORIENTATION: ORIENTATION: LEFT

## 2018-01-12 NOTE — PROGRESS NOTES
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 - EKG shows sinus bradycardia with PAC(R-58) - Concern for retinal artery occlusion?> 12 hours have passed. - Afib related? Carotid bruit? - BP was 210/85 initially and then came down to 160/69 -Normal CRP - No other acute neuro deficit seen. -Spoke to Optho on NZZM([id number])- alon Villalta and he will arrange for pt to be seen today at [facility]. No acute intervention needed as pt well out of time frame(as per optho). Plan: - Admit to [facility] consult. Await optho input and consider further rx options after etiology is confirmed. - Fall precautions - Neuro checks - ECHO - Carotid doppler  Will monitor.  Vitamin D deficiency      Past Surgical History:   Procedure Laterality Date    TOTAL HIP ARTHROPLASTY Right 2009       Restrictions  Restrictions/Precautions  Restrictions/Precautions: Fall Risk  Required Braces or Orthoses?: Yes  Implants present? : Metal implants (R THR)  Required Braces or Orthoses  Left Lower Extremity Brace: Loli Foot [employer]otics  Position Activity Restriction  Other position/activity restrictions: Pt's son has brought in the L AFO  Subjective   General  Chart Reviewed: Yes  Additional Pertinent Hx: lost right eye sight in Oct 2017, skin reddended/ scrateched distl LE right worse than left, IV R wrist, no falls greater than a year ago  Family / Caregiver Present: No  Referring Practitioner: Dr Randye Fabry: Pt sitting up in recliner upon arrival for therapy. Pt states, \"I need to use the restroom\". Pain Screening  Patient Currently in Pain: Yes  Pain Assessment  Pain Assessment: 0-10  Pain Level: 4  Pain Type: Chronic pain  Pain Location: Foot; Shoulder (left foot, B shoulders)  Pain Orientation: Left  Vital Signs  Patient Currently in Pain: Yes       Orientation     Objective      Transfers  Sit to Stand: Stand by assistance;Supervision  Stand to sit: Stand by assistance;Supervision  Ambulation  Ambulation?: Yes  More Ambulation?: Yes  Ambulation 1  Surface: level tile  Device: Rolling Walker  Assistance: Stand by assistance  Quality of Gait: good pace, wider WILFRIDO  Distance: 15 and 10' with seated RB (to and from bathroom)  Comments: wears L ankle brace  Ambulation 2  Surface - 2: level tile  Device 2: Rolling Walker  Assistance 2: Stand by assistance  Quality of Gait 2: good pace, ext rot of feet, focused on wider WILFRIDO with better follow through this date. Distance: 145' x 2 with seated RB      Balance  Standing - Dynamic: Fair;+  Comments: pt performed dynamic standing balance in bathroom for ADL's and was able to stand 5 min x 2 with brief seated RB. Pt performed ADL's often w/ 0 UE assist with Sx1. Exercises  Straight Leg Raise: SLR w/ QS; x 10 H3   Quad Sets: x 20 H3   Gluteal Sets: x 20 H3   Hip Abduction: x 20 supine   Knee Short Arc Quad: x 20 H5  Ankle Pumps: x 30   Comments: pt performed supine LE ther ex secondary to fatigue. Assessment   Body structures, Functions, Activity limitations: Decreased functional mobility   Assessment: Pt performed small ambulation to/from bathroom as well as supine LE ther ex secondary to fatigue. Pt left to lay in bed for a nap with call light and bed alarm in place. Continue to progress as tolerated.    Treatment Diagnosis: difficulty walking and LE weakness with RLE cellulitis  REQUIRES PT FOLLOW UP: Yes  Activity Tolerance  Activity Tolerance: Patient Tolerated treatment well  PT Equipment Recommendations  Equipment Needed: No       Discharge Recommendations:  Home with assist PRN    G-Code     OutComes Score                                                    AM-PAC Score             Goals  Short term goals  Time Frame for Short term goals: 2-4 days  Short term goal 1: Indep transitions  Short term goal 2: Ambulate with ww without kicking own feet together (wider WILFRIDO) with SBA  Short term goal 3:

## 2018-01-12 NOTE — PROGRESS NOTES
HEP  Patient Goals   Patient goals : Go home soon    Plan    Plan  Times per week:  (5-7)  Times per day:  (1-2)  Plan weeks: 1 week  Current Treatment Recommendations: Strengthening, Functional Mobility Training, Transfer Training  Safety Devices  Type of devices:  All fall risk precautions in place  Restraints  Initially in place: Yes     Therapy Time   Individual Concurrent Group Co-treatment   Time In  1010         Time Out  1100         Minutes  [address] Del[address] Brunson, PTA  License and [facility] 33 Number: [id number]

## 2018-01-12 NOTE — PROGRESS NOTES
Intravenous Q6H PRN Braden Rivas MD        sodium chloride flush 0.9 % injection 10 mL  10 mL Intravenous PRN Braden Rivas MD         Allergies   Allergen Reactions    Cephalexin     Cipro Xr     Doxycycline     Meprobamate     Penicillins     Relafen [Nabumetone]     Nsaids      Upset stomach at times. Active Problems:    Cellulitis of right leg    Blood pressure (!) 145/57, pulse 61, temperature 97.5 °F (36.4 °C), resp. rate 18, height 4' 11\" (1.499 m), weight 107 lb 5.8 oz (48.7 kg), SpO2 98 %, not currently breastfeeding. Subjective:  Symptoms:  No shortness of breath, malaise, cough, chest pain, weakness, headache, chest pressure, anorexia, diarrhea or anxiety. Diet:  No nausea or vomiting. Objective:  General Appearance:  Comfortable, well-appearing and in no acute distress. Vital signs: (most recent): Blood pressure (!) 145/57, pulse 61, temperature 97.5 °F (36.4 °C), resp. rate 18, height 4' 11\" (1.499 m), weight 107 lb 5.8 oz (48.7 kg), SpO2 98 %, not currently breastfeeding. HEENT: Normal HEENT exam.    Lungs:  Normal effort and normal respiratory rate. Breath sounds clear to auscultation. Heart: Regular rhythm. S1 normal and S2 normal.    Abdomen: Abdomen is soft. Bowel sounds are normal.     Pulses: Distal pulses are intact. Neurological: Patient is alert. Pupils:  Pupils are equal, round, and reactive to light. Skin:  Warm and dry.       Lab Results   Component Value Date    WBC 7.0 01/11/2018    HGB 13.4 01/11/2018    HCT 40.0 01/11/2018    MCV 97.2 01/11/2018     01/11/2018     Lab Results   Component Value Date     01/11/2018    K 5.1 01/11/2018    CL 97 01/11/2018    CO2 24 01/11/2018    BUN 33 01/11/2018    CREATININE 0.68 01/11/2018    GLUCOSE 88 01/11/2018    CALCIUM 9.6 01/11/2018        Assessment & Plan  Right lower ext cellulitis  Resolving  Cc/w Po abx  2) HTN stable  3) afib rate controlled  4) weakness Pt/OT  5) DVT ppx  Juan Antonio Alonzo, MD  1/12/2018

## 2018-01-13 PROCEDURE — 6370000000 HC RX 637 (ALT 250 FOR IP): Performed by: INTERNAL MEDICINE

## 2018-01-13 PROCEDURE — 97530 THERAPEUTIC ACTIVITIES: CPT

## 2018-01-13 PROCEDURE — 97116 GAIT TRAINING THERAPY: CPT

## 2018-01-13 PROCEDURE — 1200000000 HC SEMI PRIVATE

## 2018-01-13 RX ADMIN — NYSTATIN AND TRIAMCINOLONE ACETONIDE: 100000; 1 CREAM TOPICAL at 09:27

## 2018-01-13 RX ADMIN — LOSARTAN POTASSIUM 50 MG: 50 TABLET ORAL at 09:24

## 2018-01-13 RX ADMIN — SILVER SULFADIAZINE: 10 CREAM TOPICAL at 21:26

## 2018-01-13 RX ADMIN — POTASSIUM CHLORIDE 10 MEQ: 750 TABLET, FILM COATED, EXTENDED RELEASE ORAL at 09:24

## 2018-01-13 RX ADMIN — AMLODIPINE BESYLATE 5 MG: 5 TABLET ORAL at 09:24

## 2018-01-13 RX ADMIN — METOPROLOL SUCCINATE 25 MG: 25 TABLET, FILM COATED, EXTENDED RELEASE ORAL at 09:24

## 2018-01-13 RX ADMIN — LOSARTAN POTASSIUM 50 MG: 50 TABLET ORAL at 21:20

## 2018-01-13 RX ADMIN — DOCUSATE SODIUM 100 MG: 100 CAPSULE, LIQUID FILLED ORAL at 09:24

## 2018-01-13 RX ADMIN — CEPHALEXIN 500 MG: 500 CAPSULE ORAL at 05:57

## 2018-01-13 RX ADMIN — FUROSEMIDE 20 MG: 20 TABLET ORAL at 09:24

## 2018-01-13 RX ADMIN — DIPHENHYDRAMINE HCL 25 MG: 25 TABLET ORAL at 21:20

## 2018-01-13 RX ADMIN — ATORVASTATIN CALCIUM 40 MG: 40 TABLET, FILM COATED ORAL at 21:32

## 2018-01-13 RX ADMIN — POTASSIUM CHLORIDE 10 MEQ: 750 TABLET, FILM COATED, EXTENDED RELEASE ORAL at 21:20

## 2018-01-13 RX ADMIN — CEPHALEXIN 500 MG: 500 CAPSULE ORAL at 21:20

## 2018-01-13 RX ADMIN — NYSTATIN AND TRIAMCINOLONE ACETONIDE: 100000; 1 CREAM TOPICAL at 21:25

## 2018-01-13 RX ADMIN — DOCUSATE SODIUM 100 MG: 100 CAPSULE, LIQUID FILLED ORAL at 21:27

## 2018-01-13 RX ADMIN — RIVAROXABAN 15 MG: 15 TABLET, FILM COATED ORAL at 09:24

## 2018-01-13 RX ADMIN — PANTOPRAZOLE SODIUM 40 MG: 40 TABLET, DELAYED RELEASE ORAL at 05:57

## 2018-01-13 RX ADMIN — ACETAMINOPHEN 650 MG: 325 TABLET ORAL at 21:20

## 2018-01-13 RX ADMIN — CEPHALEXIN 500 MG: 500 CAPSULE ORAL at 14:51

## 2018-01-13 ASSESSMENT — PAIN DESCRIPTION - ORIENTATION: ORIENTATION: LEFT

## 2018-01-13 ASSESSMENT — PAIN DESCRIPTION - PAIN TYPE: TYPE: CHRONIC PAIN

## 2018-01-13 ASSESSMENT — PAIN SCALES - GENERAL
PAINLEVEL_OUTOF10: 5
PAINLEVEL_OUTOF10: 5

## 2018-01-13 ASSESSMENT — PAIN DESCRIPTION - PROGRESSION: CLINICAL_PROGRESSION: NOT CHANGED

## 2018-01-13 ASSESSMENT — PAIN DESCRIPTION - DESCRIPTORS: DESCRIPTORS: ACHING

## 2018-01-13 ASSESSMENT — PAIN DESCRIPTION - FREQUENCY: FREQUENCY: INTERMITTENT

## 2018-01-13 NOTE — PROGRESS NOTES
of feet, focused on wider WILFRIDO with better follow through this date. Distance: (P) 120' x 1, 1 standing rest break     Balance  Posture: (P) Fair  Sitting - Static: (P) Good  Sitting - Dynamic: (P) Good  Standing - Static: (P) Good  Standing - Dynamic: (P) Fair;+  Comments: (P) pt performed ADLs in bathroom, SUP                           Assessment   Body structures, Functions, Activity limitations: (P) Decreased functional mobility   Assessment: (P) Focus on transfers and ambulation this morning; pt fatigued, up last night d/t itching/R LE.   Treatment Diagnosis: (P) difficulty walking and LE weakness with RLE cellulitis  REQUIRES PT FOLLOW UP: (P) Yes  Activity Tolerance  Activity Tolerance: (P) Patient Tolerated treatment well  PT Equipment Recommendations  Equipment Needed: (P) No       Discharge Recommendations:  (P) Home with assist PRN    G-Code     OutComes Score                                                    AM-PAC Score             Goals  Short term goals  Time Frame for Short term goals: (P) 2-4 days  Short term goal 1: (P) Indep transitions  Short term goal 2: (P) Ambulate with ww without kicking own feet together (wider WILFRIDO) with SBA  Short term goal 3: (P) Perfrom steps with HR with SBA  Short term goal 4: (P) tolerate 2x10 reps LE seated LUDIN  Long term goals  Time Frame for Long term goals : (P) 4-7 days  Long term goal 1: (P) I transitions  Long term goal 2: (P) Indep amb with ww  Long term goal 3: (P) Steps with HR and S  Long term goal 4: (P) Establish HEP  Patient Goals   Patient goals : (P) Go home soon    Plan    Plan  Times per week:  (5-7)  Times per day:  (1-2)  Plan weeks: (P) 1 week  Current Treatment Recommendations: (P) Strengthening, Functional Mobility Training, Transfer Training  Safety Devices  Type of devices: (P) All fall risk precautions in place, Call light within reach, Chair alarm in place  Restraints  Initially in place: (P) Yes     Therapy Time   Individual Concurrent

## 2018-01-14 VITALS
SYSTOLIC BLOOD PRESSURE: 153 MMHG | HEART RATE: 65 BPM | BODY MASS INDEX: 21.11 KG/M2 | TEMPERATURE: 98.6 F | HEIGHT: 59 IN | OXYGEN SATURATION: 98 % | WEIGHT: 104.72 LBS | DIASTOLIC BLOOD PRESSURE: 53 MMHG | RESPIRATION RATE: 18 BRPM

## 2018-01-14 PROCEDURE — 6370000000 HC RX 637 (ALT 250 FOR IP): Performed by: INTERNAL MEDICINE

## 2018-01-14 RX ORDER — AMLODIPINE BESYLATE 5 MG/1
5 TABLET ORAL DAILY
Qty: 30 TABLET | Refills: 3 | Status: SHIPPED | OUTPATIENT
Start: 2018-01-15 | End: 2018-01-15 | Stop reason: SDUPTHER

## 2018-01-14 RX ORDER — PSEUDOEPHEDRINE HCL 30 MG
100 TABLET ORAL 2 TIMES DAILY
Qty: 30 CAPSULE | Refills: 0 | Status: SHIPPED | OUTPATIENT
Start: 2018-01-14

## 2018-01-14 RX ORDER — POTASSIUM CHLORIDE 750 MG/1
10 TABLET, FILM COATED, EXTENDED RELEASE ORAL 2 TIMES DAILY
Qty: 60 TABLET | Refills: 3 | Status: SHIPPED | OUTPATIENT
Start: 2018-01-14 | End: 2018-01-14

## 2018-01-14 RX ORDER — FUROSEMIDE 20 MG/1
20 TABLET ORAL DAILY
Qty: 60 TABLET | Refills: 3 | Status: SHIPPED | OUTPATIENT
Start: 2018-01-15 | End: 2018-01-15 | Stop reason: SDUPTHER

## 2018-01-14 RX ORDER — POTASSIUM CHLORIDE 750 MG/1
10 TABLET, FILM COATED, EXTENDED RELEASE ORAL DAILY
Qty: 60 TABLET | Refills: 3 | Status: SHIPPED | OUTPATIENT
Start: 2018-01-14 | End: 2018-01-15 | Stop reason: SDUPTHER

## 2018-01-14 RX ORDER — CEPHALEXIN 500 MG/1
500 CAPSULE ORAL EVERY 8 HOURS SCHEDULED
Qty: 9 CAPSULE | Refills: 0 | Status: SHIPPED | OUTPATIENT
Start: 2018-01-14 | End: 2018-01-18 | Stop reason: ALTCHOICE

## 2018-01-14 RX ADMIN — POTASSIUM CHLORIDE 10 MEQ: 750 TABLET, FILM COATED, EXTENDED RELEASE ORAL at 08:28

## 2018-01-14 RX ADMIN — CEPHALEXIN 500 MG: 500 CAPSULE ORAL at 08:29

## 2018-01-14 RX ADMIN — LOSARTAN POTASSIUM 50 MG: 50 TABLET ORAL at 08:28

## 2018-01-14 RX ADMIN — RIVAROXABAN 15 MG: 15 TABLET, FILM COATED ORAL at 08:28

## 2018-01-14 RX ADMIN — PANTOPRAZOLE SODIUM 40 MG: 40 TABLET, DELAYED RELEASE ORAL at 08:28

## 2018-01-14 RX ADMIN — DOCUSATE SODIUM 100 MG: 100 CAPSULE, LIQUID FILLED ORAL at 08:28

## 2018-01-14 RX ADMIN — DIPHENHYDRAMINE HCL 25 MG: 25 TABLET ORAL at 11:46

## 2018-01-14 RX ADMIN — FUROSEMIDE 20 MG: 20 TABLET ORAL at 08:28

## 2018-01-14 RX ADMIN — NYSTATIN AND TRIAMCINOLONE ACETONIDE: 100000; 1 CREAM TOPICAL at 08:29

## 2018-01-14 RX ADMIN — AMLODIPINE BESYLATE 5 MG: 5 TABLET ORAL at 08:28

## 2018-01-14 RX ADMIN — METOPROLOL SUCCINATE 25 MG: 25 TABLET, FILM COATED, EXTENDED RELEASE ORAL at 08:28

## 2018-01-14 NOTE — FLOWSHEET NOTE
Pt dc'd home, pt son on hand to accompany pt home, pt and son acknowledge understanding of discharge, pt ambulatory with walker prior to discharge, pt meds in med room drawer given to pt prior to discharge.

## 2018-01-15 ENCOUNTER — TELEPHONE (OUTPATIENT)
Dept: CARDIOLOGY | Age: 83
End: 2018-01-15

## 2018-01-15 ENCOUNTER — TELEPHONE (OUTPATIENT)
Dept: FAMILY MEDICINE CLINIC | Age: 83
End: 2018-01-15

## 2018-01-15 RX ORDER — AMLODIPINE BESYLATE 5 MG/1
5 TABLET ORAL DAILY
Qty: 7 TABLET | Refills: 0 | Status: SHIPPED | OUTPATIENT
Start: 2018-01-15 | End: 2018-01-24 | Stop reason: ALTCHOICE

## 2018-01-15 RX ORDER — FUROSEMIDE 20 MG/1
20 TABLET ORAL DAILY
Qty: 7 TABLET | Refills: 0 | Status: SHIPPED | OUTPATIENT
Start: 2018-01-15 | End: 2018-01-24 | Stop reason: ALTCHOICE

## 2018-01-15 RX ORDER — POTASSIUM CHLORIDE 750 MG/1
10 TABLET, FILM COATED, EXTENDED RELEASE ORAL DAILY
Qty: 7 TABLET | Refills: 0 | Status: SHIPPED | OUTPATIENT
Start: 2018-01-15 | End: 2018-01-24 | Stop reason: ALTCHOICE

## 2018-01-15 NOTE — TELEPHONE ENCOUNTER
Dee Preciado with [address] [place] [address] called to inform you that the patient has been discharged from [address] [place] [address] for rt leg cellulitis, and admitted to [facility]. She has a pending appointment with you on Thursday this week. Any questions please call Dee Preciado at [phone].   Ruth Alcantara

## 2018-01-18 ENCOUNTER — OFFICE VISIT (OUTPATIENT)
Dept: FAMILY MEDICINE CLINIC | Age: 83
End: 2018-01-18

## 2018-01-18 VITALS
TEMPERATURE: 98.1 F | RESPIRATION RATE: 16 BRPM | SYSTOLIC BLOOD PRESSURE: 106 MMHG | HEIGHT: 59 IN | HEART RATE: 72 BPM | OXYGEN SATURATION: 96 % | WEIGHT: 110 LBS | BODY MASS INDEX: 22.18 KG/M2 | DIASTOLIC BLOOD PRESSURE: 60 MMHG

## 2018-01-18 DIAGNOSIS — L02.415 CELLULITIS AND ABSCESS OF RIGHT LOWER EXTREMITY: Primary | ICD-10-CM

## 2018-01-18 DIAGNOSIS — L03.115 CELLULITIS AND ABSCESS OF RIGHT LOWER EXTREMITY: Primary | ICD-10-CM

## 2018-01-18 DIAGNOSIS — I48.0 PAROXYSMAL ATRIAL FIBRILLATION (HCC): Chronic | ICD-10-CM

## 2018-01-18 DIAGNOSIS — M06.00 SERONEGATIVE RHEUMATOID ARTHRITIS (HCC): Chronic | ICD-10-CM

## 2018-01-18 DIAGNOSIS — I10 ESSENTIAL HYPERTENSION: Chronic | ICD-10-CM

## 2018-01-18 PROCEDURE — 99214 OFFICE O/P EST MOD 30 MIN: CPT | Performed by: FAMILY MEDICINE

## 2018-01-18 ASSESSMENT — ENCOUNTER SYMPTOMS
COUGH: 0
WHEEZING: 0
SHORTNESS OF BREATH: 0
ANAL BLEEDING: 0
CONSTIPATION: 0
NAUSEA: 0
DIARRHEA: 0
VOMITING: 0
BLOOD IN STOOL: 0
ABDOMINAL PAIN: 0
CHEST TIGHTNESS: 0

## 2018-01-18 NOTE — PROGRESS NOTES
Subjective:      Patient ID: Piedad Leone is a 80 y.o. female who presents today for:  Chief Complaint   Patient presents with    Follow-Up from Hospital     pt comes in today to follow up after being in the hospital for r leg cellulitis that was started from itchy sores on her legs---       HPI     Patient here for hospital follow-up visit. She was admitted at [facility] on 01/04/18 for cellulitis and abscess of the right lower extremity. Podiatry (Dr. Matt Wang) was consulted. She was treated with IV antibiotics, showed improvement with treatment and was found stable to for discharge to subacute rehab on 01/06/18. She received rehab and therapy in subacute care and her medications were adjusted. She was started on amlodipine, furosemide and KCL, diltiazem was discontinued. She was found stable for discharge home on 01/14/18 to follow up with primary care as an outpatient. Patient states that leg pain, erythema, warmth and swelling has resolved since hospital stay. She is receiving home care/home therapy ongoing two days a week. Per son home BP values have ranged from 489'F-981'I systolic since new medication adjustments were made in the hospital. She denies any chest pain, palpitations, dyspnea, lightheadedness, dizziness, or syncope. There have been no reported issues with lower extremity swelling with being on furosemide. Kidney function has been followed with labwork, most recent testing was normal a week ago. She has cardiology visit scheduled next week to further review medication and long term management plan. Rheumatology visit has not been scheduled since her most recent office visit.      Past Medical History:   Diagnosis Date    Anxiety     Atrial fibrillation ([address]     Bilateral carotid artery stenosis 10/27/2017    Central retinal artery occlusion, right eye 10/17/2017    Chronic constipation     Chronic venous insufficiency     Dyslipidemia     Essential hypertension  HTN (hypertension) 6/29/2013    Neurodermatitis     chronic, of bilateral lower extremities    Osteoporosis     Pap test, as part of routine gynecological examination     years ago   [facility] Screening mammogram     about 5 yrs ago    Seronegative rheumatoid arthritis ([address]     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 Painless. - Had CT head without contrast at OSH ED that shows: No acute intracranial abnormality. 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 - EKG shows sinus bradycardia with PAC(R-58) - Concern for retinal artery occlusion?> 12 hours have passed. - Afib related? Carotid bruit? - BP was 210/85 initially and then came down to 160/69 -Normal CRP - No other acute neuro deficit seen. -Spoke to Optho on APPW([id number])- alon Villalta and he will arrange for pt to be seen today at [facility]. No acute intervention needed as pt well out of time frame(as per optho). Plan: - Admit to [facility] consult. Await optho input and consider further rx options after etiology is confirmed. - Fall precautions - Neuro checks - ECHO - Carotid doppler  Will monitor.  Vitamin D deficiency      Past Surgical History:   Procedure Laterality Date    TOTAL HIP ARTHROPLASTY Right 2009     Family History   Problem Relation Age of Onset    Cancer Daughter      lung, smoker      Social History     Social History    Marital status:      Spouse name: N/A    Number of children: N/A    Years of education: N/A     Occupational History    Not on file.      Social History Main Topics    Smoking status: Never Smoker    Smokeless tobacco: Never Used    Alcohol use Yes      Comment: rarely    Drug use: No    Sexual activity: No     Other Topics Nsaids    Review of Systems   Constitutional: Negative for appetite change, chills, diaphoresis, fatigue, fever and unexpected weight change. Eyes: Negative for visual disturbance. Respiratory: Negative for cough, chest tightness, shortness of breath and wheezing. Cardiovascular: Negative for chest pain, palpitations and leg swelling. Gastrointestinal: Negative for abdominal pain, anal bleeding, blood in stool, constipation, diarrhea, nausea and vomiting. No heartburn, No melena   Genitourinary: Negative for dysuria and hematuria. Musculoskeletal: Positive for arthralgias and gait problem. Skin: Negative for rash. Neurological: Negative for dizziness, syncope, weakness, light-headedness and headaches. Objective:     /60 (Site: Left Arm, Position: Sitting, Cuff Size: Small Adult)   Pulse 72   Temp 98.1 °F (36.7 °C) (Temporal)   Resp 16   Ht 4' 11\" (1.499 m)   Wt 110 lb (49.9 kg)   SpO2 96%   BMI 22.22 kg/m²     Physical Exam   Constitutional: She is oriented to person, place, and time. She appears well-developed and well-nourished. No distress. Neck: Neck supple. No thyromegaly present. Cardiovascular: Normal rate. An irregularly irregular rhythm present. Pulmonary/Chest: Effort normal and breath sounds normal. No respiratory distress. She has no wheezes. She has no rales. Abdominal: Bowel sounds are normal. There is no tenderness. Musculoskeletal: She exhibits no edema. Lymphadenopathy:     She has no cervical adenopathy. Neurological: She is alert and oriented to person, place, and time. Skin: No rash noted. She is not diaphoretic. Ortho Exam (If Applicable)      Assessment & Plan:      1. Cellulitis and abscess of right lower extremity  Resolved since hospital discharge. Patient without any leg pain, leg swelling, erythema or warmth. I reviewed with her and son at length the importance of moisturizing the skin to prevent dry, itchy skin.     2.

## 2018-01-19 ENCOUNTER — HOSPITAL ENCOUNTER (OUTPATIENT)
Age: 83
Setting detail: SPECIMEN
Discharge: HOME | End: 2018-01-19
Payer: COMMERCIAL

## 2018-01-19 LAB
ANION GAP SERPL CALCULATED.3IONS-SCNC: 15 MEQ/L (ref 7–13)
BUN BLDV-MCNC: 30 MG/DL (ref 8–23)
CALCIUM SERPL-MCNC: 9.4 MG/DL (ref 8.6–10.2)
CHLORIDE BLD-SCNC: 96 MEQ/L (ref 98–107)
CO2: 22 MEQ/L (ref 22–29)
CREAT SERPL-MCNC: 0.61 MG/DL (ref 0.5–0.9)
GFR AFRICAN AMERICAN: >60
GFR NON-AFRICAN AMERICAN: >60
GLUCOSE BLD-MCNC: 84 MG/DL (ref 74–109)
POTASSIUM SERPL-SCNC: 5.4 MEQ/L (ref 3.5–5.1)
SODIUM BLD-SCNC: 133 MEQ/L (ref 132–144)

## 2018-01-19 PROCEDURE — 80048 BASIC METABOLIC PNL TOTAL CA: CPT

## 2018-01-24 ENCOUNTER — OFFICE VISIT (OUTPATIENT)
Dept: CARDIOLOGY CLINIC | Age: 83
End: 2018-01-24
Payer: COMMERCIAL

## 2018-01-24 VITALS
HEART RATE: 56 BPM | DIASTOLIC BLOOD PRESSURE: 50 MMHG | SYSTOLIC BLOOD PRESSURE: 102 MMHG | WEIGHT: 113.2 LBS | BODY MASS INDEX: 22.86 KG/M2 | RESPIRATION RATE: 12 BRPM

## 2018-01-24 DIAGNOSIS — I65.23 BILATERAL CAROTID ARTERY STENOSIS: ICD-10-CM

## 2018-01-24 DIAGNOSIS — I10 ESSENTIAL HYPERTENSION: ICD-10-CM

## 2018-01-24 DIAGNOSIS — I48.0 PAROXYSMAL ATRIAL FIBRILLATION (HCC): Primary | ICD-10-CM

## 2018-01-24 PROCEDURE — 4040F PNEUMOC VAC/ADMIN/RCVD: CPT | Performed by: INTERNAL MEDICINE

## 2018-01-24 PROCEDURE — G8427 DOCREV CUR MEDS BY ELIG CLIN: HCPCS | Performed by: INTERNAL MEDICINE

## 2018-01-24 PROCEDURE — 1111F DSCHRG MED/CURRENT MED MERGE: CPT | Performed by: INTERNAL MEDICINE

## 2018-01-24 PROCEDURE — 1036F TOBACCO NON-USER: CPT | Performed by: INTERNAL MEDICINE

## 2018-01-24 PROCEDURE — 1123F ACP DISCUSS/DSCN MKR DOCD: CPT | Performed by: INTERNAL MEDICINE

## 2018-01-24 PROCEDURE — 1090F PRES/ABSN URINE INCON ASSESS: CPT | Performed by: INTERNAL MEDICINE

## 2018-01-24 PROCEDURE — G8484 FLU IMMUNIZE NO ADMIN: HCPCS | Performed by: INTERNAL MEDICINE

## 2018-01-24 PROCEDURE — 99215 OFFICE O/P EST HI 40 MIN: CPT | Performed by: INTERNAL MEDICINE

## 2018-01-24 PROCEDURE — G8420 CALC BMI NORM PARAMETERS: HCPCS | Performed by: INTERNAL MEDICINE

## 2018-01-24 PROCEDURE — G8598 ASA/ANTIPLAT THER USED: HCPCS | Performed by: INTERNAL MEDICINE

## 2018-01-24 RX ORDER — DILTIAZEM HYDROCHLORIDE 60 MG/1
60 TABLET, FILM COATED ORAL 3 TIMES DAILY
Qty: 120 TABLET | Refills: 5 | Status: SHIPPED | OUTPATIENT
Start: 2018-01-24 | End: 2018-01-24 | Stop reason: SDUPTHER

## 2018-01-24 RX ORDER — METOPROLOL SUCCINATE 25 MG/1
TABLET, EXTENDED RELEASE ORAL
Qty: 30 TABLET | Refills: 5 | Status: SHIPPED | OUTPATIENT
Start: 2018-01-24

## 2018-01-24 RX ORDER — DILTIAZEM HYDROCHLORIDE 60 MG/1
60 TABLET, FILM COATED ORAL 3 TIMES DAILY
Qty: 120 TABLET | Refills: 5 | Status: SHIPPED | OUTPATIENT
Start: 2018-01-24

## 2018-01-24 ASSESSMENT — ENCOUNTER SYMPTOMS
APNEA: 0
SHORTNESS OF BREATH: 0
CHEST TIGHTNESS: 0

## 2018-01-24 NOTE — PROGRESS NOTES
Reason For Visit:  Chief Complaint   Patient presents with    1 Month Follow-Up    Atrial Fibrillation    Results      le u/s       HPI:  1/24/2018: Patient presents today for follow-up of recent admission to [facility] for about 3 weeks. Her blood pressure medications were changed. Now the BP fluctuates more. About 3 months ago she was seen at the [facility] patient lives in the right eye embolic origin. She has a son who takes good care of her. I am going to restart the original regimen which was Cardizem 60 mg by mouth 3 times a day. Losartan 50 mg a morning. Toprol 25 mg at night. Xarelto 15 mg a day. DisContinue Norvasc Lasix and potassium. Last K was 5.2. See me in 2 weeks        12/6/2017: Patient presents today for AF. BP fluctuates. Take Toprol 25 at PM.No symptoms. On xarelto 15. Continue. She is 80 and accompanied by son. No angina CHF or syncopy. She will see me in 1 Month     11/8/2017: Patient presents today for TIA. Usually follows with polly. Saw Ahmet Betts a month ago she had acute loss of vision the right eye. She was [facility] to [facility]. She was discharged home on baby aspirin Lipitor was increased. She also had paroxysmal atrial fibrillation. Discharge on Cardizem 60 mg  3 times a day and Toprol 25. Carotid ultrasound showed mild disease bilaterally. Echocardiogram showed LV diastolic dysfunction great 1. Mild left atrial enlargement. She is fairly active alert for her age. She lives with the son. Se walks with a walker at home. Discussion with her and her son. I will start her on low dose Xarelto 15 mg. Renal function reportedly normal. She will see me in a month.     7/23/2014: Patient presents for follow up medical evaluation. Patient is followed on a regular basis by Dr. Alena Duffy, CNP.  Pt denies chest pain, dyspnea, dyspnea on exertion, change in exercise capacity, fatigue,  nausea, vomiting, diarrhea, constipation, motor weakness, insomnia, weight loss, syncope, dizziness, metoprolol succinate (TOPROL XL) 25 MG extended release tablet, TAKE 1 TABLET BY MOUTH ONCE DAILY, Disp: 30 tablet, Rfl: 5    docusate sodium (COLACE, DULCOLAX) 100 MG CAPS, Take 100 mg by mouth 2 times daily, Disp: 30 capsule, Rfl: 0    nystatin-triamcinolone (MYCOLOG II) 629557-5.1 UNIT/GM-% cream, Apply topically 4 times daily. , Disp: 1 Tube, Rfl: 3    atorvastatin (LIPITOR) 40 MG tablet, Take 1 tablet by mouth daily, Disp: 90 tablet, Rfl: 1    losartan (COZAAR) 50 MG tablet, Take 1 tablet by mouth daily, Disp: 90 tablet, Rfl: 1    rivaroxaban (XARELTO) 15 MG TABS tablet, Take 1 tablet by mouth daily (with breakfast), Disp: 90 tablet, Rfl: 3    AMITIZA 8 MCG CAPS capsule, TAKE 1 CAPSULE BY MOUTH ONCE DAILY, Disp: 30 capsule, Rfl: 3    fluocinonide (LIDEX) 0.05 % cream, Apply topically 2 times daily as needed (itching), Disp: 15 g, Rfl: 3    omeprazole (PRILOSEC) 20 MG delayed release capsule, TAKE ONE (1) CAPSULE BY MOUTH ONCE DAILY, Disp: 30 capsule, Rfl: 10    fluticasone (FLONASE) 50 MCG/ACT nasal spray, USE 1 SPRAY IN EACH NOSTRIL ONCE DAILY, Disp: 16 g, Rfl: 10    traMADol (ULTRAM) 50 MG tablet, TAKE 1/2 TABLET BY MOUTH EVERY 4 TO 6 HOURS AS NEEDED FOR PAIN, Disp: 50 tablet, Rfl: 0    Lactobacillus (PROBIOTIC ACIDOPHILUS) TABS, Take 1 tablet by mouth 2 times daily, Disp: 60 tablet, Rfl: 3    Multiple Vitamin (MULTIVITAMIN PO), Take  by mouth daily. , Disp: , Rfl:       Review of Systems:  Review of Systems   Constitutional: Negative for appetite change and fatigue. Respiratory: Negative for apnea, chest tightness and shortness of breath. Cardiovascular: Positive for leg swelling. Negative for chest pain and palpitations. Neurological: Negative for dizziness, syncope, weakness, light-headedness and headaches. Hematological: Does not bruise/bleed easily. Psychiatric/Behavioral: Negative for agitation, behavioral problems and confusion.  The patient is not nervous/anxious and is not hyperactive. All other systems reviewed and are negative. Objective  Vitals:    01/24/18 1158   BP: (!) 102/50   Site: Left Arm   Position: Sitting   Cuff Size: Medium Adult   Pulse: 56   Resp: 12   Weight: 113 lb 3.2 oz (51.3 kg)         Physical Exam:  Physical Exam   Constitutional: She is oriented to person, place, and time. She appears well-developed and well-nourished. HENT:   Head: Normocephalic and atraumatic. Right Ear: External ear normal.   Left Ear: External ear normal.   Mouth/Throat: Oropharynx is clear and moist.   Eyes: Conjunctivae and EOM are normal. Pupils are equal, round, and reactive to light. Neck: Normal range of motion. Neck supple. Cardiovascular: Normal rate, regular rhythm, normal heart sounds and intact distal pulses. Pulmonary/Chest: Effort normal and breath sounds normal.   Abdominal: Soft. Bowel sounds are normal.   Musculoskeletal: Normal range of motion. Neurological: She is alert and oriented to person, place, and time. She has normal reflexes. Skin: Skin is warm and dry. Psychiatric: She has a normal mood and affect. Her behavior is normal. Judgment and thought content normal.           Assessment/Orders:       ICD-10-CM ICD-9-CM    1. Paroxysmal atrial fibrillation (HCC) I48.0 427.31    2. Essential hypertension I10 401.9    3.  Bilateral carotid artery stenosis I65.23 433.10      433.30        Orders Placed This Encounter   Medications    DISCONTD: diltiazem (CARDIZEM) 60 MG tablet     Sig: Take 1 tablet by mouth 3 times daily     Dispense:  120 tablet     Refill:  5    diltiazem (CARDIZEM) 60 MG tablet     Sig: Take 1 tablet by mouth 3 times daily     Dispense:  120 tablet     Refill:  5    metoprolol succinate (TOPROL XL) 25 MG extended release tablet     Sig: TAKE 1 TABLET BY MOUTH ONCE DAILY     Dispense:  30 tablet     Refill:  5       Medications Discontinued During This Encounter   Medication Reason    amLODIPine (NORVASC) 5 MG tablet

## 2018-01-25 RX ORDER — FLUTICASONE PROPIONATE 50 MCG
1 SPRAY, SUSPENSION (ML) NASAL DAILY
Qty: 16 G | Refills: 11 | Status: SHIPPED | OUTPATIENT
Start: 2018-01-25

## 2018-01-25 RX ORDER — OMEPRAZOLE 20 MG/1
20 CAPSULE, DELAYED RELEASE ORAL DAILY
Qty: 30 CAPSULE | Refills: 11 | Status: SHIPPED | OUTPATIENT
Start: 2018-01-25 | End: 2018-03-12

## 2018-01-25 NOTE — TELEPHONE ENCOUNTER
Last seen 1/18/2018. Has a follow up appointment scheduled 2/27/2018. Medication is pending if okay. Please advise.

## 2018-01-29 ENCOUNTER — APPOINTMENT (OUTPATIENT)
Dept: GENERAL RADIOLOGY | Age: 83
DRG: 563 | End: 2018-01-29
Payer: COMMERCIAL

## 2018-01-29 ENCOUNTER — APPOINTMENT (OUTPATIENT)
Dept: CT IMAGING | Age: 83
DRG: 563 | End: 2018-01-29
Payer: COMMERCIAL

## 2018-01-29 ENCOUNTER — HOSPITAL ENCOUNTER (INPATIENT)
Age: 83
LOS: 2 days | Discharge: NURSING, REHAB OR LONG-TERM CARE | DRG: 563 | End: 2018-02-02
Attending: INTERNAL MEDICINE | Admitting: INTERNAL MEDICINE
Payer: COMMERCIAL

## 2018-01-29 DIAGNOSIS — S52.531A CLOSED COLLES' FRACTURE OF RIGHT RADIUS, INITIAL ENCOUNTER: ICD-10-CM

## 2018-01-29 DIAGNOSIS — I10 ESSENTIAL HYPERTENSION: ICD-10-CM

## 2018-01-29 DIAGNOSIS — S70.01XA CONTUSION OF RIGHT HIP, INITIAL ENCOUNTER: ICD-10-CM

## 2018-01-29 DIAGNOSIS — S09.90XA CLOSED HEAD INJURY, INITIAL ENCOUNTER: ICD-10-CM
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
## 2018-01-29 PROBLEM — S52.122A RADIAL HEAD FRACTURE, CLOSED, LEFT, CLOSED, INITIAL ENCOUNTER: Status: ACTIVE | Noted: 2018-01-29

## 2018-01-29 LAB
ANION GAP SERPL CALCULATED.3IONS-SCNC: 14 MEQ/L (ref 7–13)
BUN BLDV-MCNC: 19 MG/DL (ref 8–23)
CALCIUM SERPL-MCNC: 9.8 MG/DL (ref 8.6–10.2)
CHLORIDE BLD-SCNC: 98 MEQ/L (ref 98–107)
CO2: 23 MEQ/L (ref 22–29)
CREAT SERPL-MCNC: 0.69 MG/DL (ref 0.5–0.9)
GFR AFRICAN AMERICAN: >60
GFR NON-AFRICAN AMERICAN: >60
GLUCOSE BLD-MCNC: 121 MG/DL (ref 74–109)
HCT VFR BLD CALC: 38.6 % (ref 37–47)
HEMOGLOBIN: 13.2 G/DL (ref 12–16)
MCH RBC QN AUTO: 33 PG (ref 27–31.3)
MCHC RBC AUTO-ENTMCNC: 34.2 % (ref 33–37)
MCV RBC AUTO: 96.3 FL (ref 82–100)
PDW BLD-RTO: 13.6 % (ref 11.5–14.5)
PLATELET # BLD: 195 K/UL (ref 130–400)
POTASSIUM SERPL-SCNC: 4.8 MEQ/L (ref 3.5–5.1)
RBC # BLD: 4.01 M/UL (ref 4.2–5.4)
SODIUM BLD-SCNC: 135 MEQ/L (ref 132–144)
WBC # BLD: 8.7 K/UL (ref 4.8–10.8)

## 2018-01-29 PROCEDURE — 80048 BASIC METABOLIC PNL TOTAL CA: CPT

## 2018-01-29 PROCEDURE — 85027 COMPLETE CBC AUTOMATED: CPT

## 2018-01-29 PROCEDURE — 6370000000 HC RX 637 (ALT 250 FOR IP): Performed by: INTERNAL MEDICINE

## 2018-01-29 PROCEDURE — G0378 HOSPITAL OBSERVATION PER HR: HCPCS

## 2018-01-29 PROCEDURE — 6360000002 HC RX W HCPCS: Performed by: INTERNAL MEDICINE

## 2018-01-29 PROCEDURE — 73100 X-RAY EXAM OF WRIST: CPT

## 2018-01-29 PROCEDURE — 99285 EMERGENCY DEPT VISIT HI MDM: CPT

## 2018-01-29 PROCEDURE — 73502 X-RAY EXAM HIP UNI 2-3 VIEWS: CPT

## 2018-01-29 PROCEDURE — 2580000003 HC RX 258: Performed by: INTERNAL MEDICINE

## 2018-01-29 PROCEDURE — 96375 TX/PRO/DX INJ NEW DRUG ADDON: CPT

## 2018-01-29 PROCEDURE — 96374 THER/PROPH/DIAG INJ IV PUSH: CPT

## 2018-01-29 PROCEDURE — 29125 APPL SHORT ARM SPLINT STATIC: CPT

## 2018-01-29 RX ORDER — ONDANSETRON 2 MG/ML
4 INJECTION INTRAMUSCULAR; INTRAVENOUS ONCE
Status: COMPLETED | OUTPATIENT
Start: 2018-01-29 | End: 2018-01-29

## 2018-01-29 RX ORDER — OXYCODONE HYDROCHLORIDE 5 MG/1
5 TABLET ORAL EVERY 4 HOURS PRN
Status: DISCONTINUED | OUTPATIENT
Start: 2018-01-29 | End: 2018-02-02 | Stop reason: HOSPADM

## 2018-01-29 RX ORDER — OXYCODONE HYDROCHLORIDE 5 MG/1
2.5 TABLET ORAL EVERY 4 HOURS PRN
Status: DISCONTINUED | OUTPATIENT
Start: 2018-01-29 | End: 2018-02-02 | Stop reason: HOSPADM

## 2018-01-29 RX ORDER — DOCUSATE SODIUM 100 MG/1
100 CAPSULE, LIQUID FILLED ORAL 2 TIMES DAILY
Status: DISCONTINUED | OUTPATIENT
Start: 2018-01-29 | End: 2018-02-02 | Stop reason: HOSPADM

## 2018-01-29 RX ORDER — ATORVASTATIN CALCIUM 40 MG/1
40 TABLET, FILM COATED ORAL DAILY
Status: DISCONTINUED | OUTPATIENT
Start: 2018-01-30 | End: 2018-02-02 | Stop reason: HOSPADM

## 2018-01-29 RX ORDER — LOSARTAN POTASSIUM 50 MG/1
50 TABLET ORAL DAILY
Status: DISCONTINUED | OUTPATIENT
Start: 2018-01-30 | End: 2018-01-31

## 2018-01-29 RX ORDER — PANTOPRAZOLE SODIUM 40 MG/1
40 TABLET, DELAYED RELEASE ORAL
Status: DISCONTINUED | OUTPATIENT
Start: 2018-01-30 | End: 2018-02-02 | Stop reason: HOSPADM

## 2018-01-29 RX ORDER — ACETAMINOPHEN 325 MG/1
650 TABLET ORAL ONCE
Status: COMPLETED | OUTPATIENT
Start: 2018-01-29 | End: 2018-01-29

## 2018-01-29 RX ORDER — MORPHINE SULFATE 2 MG/ML
2 INJECTION, SOLUTION INTRAMUSCULAR; INTRAVENOUS ONCE
Status: COMPLETED | OUTPATIENT
Start: 2018-01-29 | End: 2018-01-29

## 2018-01-29 RX ORDER — ONDANSETRON 2 MG/ML
4 INJECTION INTRAMUSCULAR; INTRAVENOUS EVERY 6 HOURS PRN
Status: DISCONTINUED | OUTPATIENT
Start: 2018-01-29 | End: 2018-02-02 | Stop reason: HOSPADM

## 2018-01-29 RX ORDER — SODIUM CHLORIDE 0.9 % (FLUSH) 0.9 %
3 SYRINGE (ML) INJECTION EVERY 8 HOURS
Status: DISCONTINUED | OUTPATIENT
Start: 2018-01-29 | End: 2018-01-29 | Stop reason: HOSPADM

## 2018-01-29 RX ORDER — ACETAMINOPHEN 325 MG/1
650 TABLET ORAL EVERY 4 HOURS PRN
Status: DISCONTINUED | OUTPATIENT
Start: 2018-01-29 | End: 2018-02-02 | Stop reason: HOSPADM

## 2018-01-29 RX ORDER — SODIUM CHLORIDE 0.9 % (FLUSH) 0.9 %
10 SYRINGE (ML) INJECTION PRN
Status: DISCONTINUED | OUTPATIENT
Start: 2018-01-29 | End: 2018-02-02 | Stop reason: HOSPADM

## 2018-01-29 RX ORDER — LORATADINE 10 MG/1
10 TABLET ORAL DAILY
Status: ON HOLD | COMMUNITY
End: 2018-02-14 | Stop reason: HOSPADM

## 2018-01-29 RX ORDER — SODIUM CHLORIDE 0.9 % (FLUSH) 0.9 %
10 SYRINGE (ML) INJECTION EVERY 12 HOURS SCHEDULED
Status: DISCONTINUED | OUTPATIENT
Start: 2018-01-29 | End: 2018-02-02 | Stop reason: HOSPADM

## 2018-01-29 RX ADMIN — ACETAMINOPHEN 650 MG: 325 TABLET ORAL at 20:48

## 2018-01-29 RX ADMIN — Medication 10 ML: at 23:23

## 2018-01-29 RX ADMIN — DILTIAZEM HYDROCHLORIDE 60 MG: 30 TABLET, FILM COATED ORAL at 23:22

## 2018-01-29 RX ADMIN — MORPHINE SULFATE 2 MG: 2 INJECTION, SOLUTION INTRAMUSCULAR; INTRAVENOUS at 20:55

## 2018-01-29 RX ADMIN — ONDANSETRON 4 MG: 2 INJECTION INTRAMUSCULAR; INTRAVENOUS at 20:52

## 2018-01-29 ASSESSMENT — PAIN DESCRIPTION - PROGRESSION: CLINICAL_PROGRESSION: NOT CHANGED

## 2018-01-29 ASSESSMENT — PAIN DESCRIPTION - PAIN TYPE
TYPE: ACUTE PAIN
TYPE_2: ACUTE PAIN
TYPE: ACUTE PAIN

## 2018-01-29 ASSESSMENT — PAIN DESCRIPTION - LOCATION
LOCATION: WRIST
LOCATION_2: HEAD
LOCATION: HEAD;WRIST

## 2018-01-29 ASSESSMENT — PAIN DESCRIPTION - FREQUENCY
FREQUENCY: CONTINUOUS
FREQUENCY: CONTINUOUS

## 2018-01-29 ASSESSMENT — PAIN SCALES - GENERAL
PAINLEVEL_OUTOF10: 5
PAINLEVEL_OUTOF10: 7

## 2018-01-29 ASSESSMENT — PAIN DESCRIPTION - ORIENTATION
ORIENTATION: RIGHT
ORIENTATION: RIGHT

## 2018-01-29 ASSESSMENT — PAIN DESCRIPTION - DESCRIPTORS
DESCRIPTORS: ACHING
DESCRIPTORS: CONSTANT;SHARP;SHOOTING

## 2018-01-29 ASSESSMENT — PAIN DESCRIPTION - ONSET: ONSET: ON-GOING

## 2018-01-29 ASSESSMENT — PAIN DESCRIPTION - INTENSITY: RATING_2: 3

## 2018-01-30 LAB
ANION GAP SERPL CALCULATED.3IONS-SCNC: 10 MEQ/L (ref 7–13)
BASOPHILS ABSOLUTE: 0 K/UL (ref 0–0.2)
BASOPHILS RELATIVE PERCENT: 0.3 %
BUN BLDV-MCNC: 17 MG/DL (ref 8–23)
CALCIUM SERPL-MCNC: 9.1 MG/DL (ref 8.6–10.2)
CHLORIDE BLD-SCNC: 99 MEQ/L (ref 98–107)
CO2: 25 MEQ/L (ref 22–29)
CREAT SERPL-MCNC: 0.56 MG/DL (ref 0.5–0.9)
EOSINOPHILS ABSOLUTE: 0.4 K/UL (ref 0–0.7)
EOSINOPHILS RELATIVE PERCENT: 3.7 %
GFR AFRICAN AMERICAN: >60
GFR NON-AFRICAN AMERICAN: >60
GLUCOSE BLD-MCNC: 89 MG/DL (ref 74–109)
HCT VFR BLD CALC: 33.1 % (ref 37–47)
HEMOGLOBIN: 11.1 G/DL (ref 12–16)
LYMPHOCYTES ABSOLUTE: 1.6 K/UL (ref 1–4.8)
LYMPHOCYTES RELATIVE PERCENT: 14.3 %
MCH RBC QN AUTO: 32.5 PG (ref 27–31.3)
MCHC RBC AUTO-ENTMCNC: 33.5 % (ref 33–37)
MCV RBC AUTO: 97 FL (ref 82–100)
MONOCYTES ABSOLUTE: 1.1 K/UL (ref 0.2–0.8)
MONOCYTES RELATIVE PERCENT: 10.5 %
NEUTROPHILS ABSOLUTE: 7.7 K/UL (ref 1.4–6.5)
NEUTROPHILS RELATIVE PERCENT: 71.2 %
PDW BLD-RTO: 14 % (ref 11.5–14.5)
PLATELET # BLD: 168 K/UL (ref 130–400)
POTASSIUM REFLEX MAGNESIUM: 5.3 MEQ/L (ref 3.5–5.1)
RBC # BLD: 3.41 M/UL (ref 4.2–5.4)
SODIUM BLD-SCNC: 134 MEQ/L (ref 132–144)
WBC # BLD: 10.9 K/UL (ref 4.8–10.8)

## 2018-01-30 PROCEDURE — G0378 HOSPITAL OBSERVATION PER HR: HCPCS

## 2018-01-30 PROCEDURE — 36415 COLL VENOUS BLD VENIPUNCTURE: CPT

## 2018-01-30 PROCEDURE — G8987 SELF CARE CURRENT STATUS: HCPCS

## 2018-01-30 PROCEDURE — 80048 BASIC METABOLIC PNL TOTAL CA: CPT

## 2018-01-30 PROCEDURE — 97162 PT EVAL MOD COMPLEX 30 MIN: CPT

## 2018-01-30 PROCEDURE — 97166 OT EVAL MOD COMPLEX 45 MIN: CPT

## 2018-01-30 PROCEDURE — G8978 MOBILITY CURRENT STATUS: HCPCS

## 2018-01-30 PROCEDURE — 85025 COMPLETE CBC W/AUTO DIFF WBC: CPT

## 2018-01-30 PROCEDURE — 6360000002 HC RX W HCPCS: Performed by: INTERNAL MEDICINE

## 2018-01-30 PROCEDURE — 97530 THERAPEUTIC ACTIVITIES: CPT

## 2018-01-30 PROCEDURE — 2580000003 HC RX 258: Performed by: INTERNAL MEDICINE

## 2018-01-30 PROCEDURE — G8988 SELF CARE GOAL STATUS: HCPCS

## 2018-01-30 PROCEDURE — 97535 SELF CARE MNGMENT TRAINING: CPT

## 2018-01-30 PROCEDURE — 96376 TX/PRO/DX INJ SAME DRUG ADON: CPT

## 2018-01-30 PROCEDURE — 6370000000 HC RX 637 (ALT 250 FOR IP): Performed by: INTERNAL MEDICINE

## 2018-01-30 PROCEDURE — G8979 MOBILITY GOAL STATUS: HCPCS

## 2018-01-30 RX ORDER — CYCLOBENZAPRINE HCL 10 MG
5 TABLET ORAL 3 TIMES DAILY
Status: DISCONTINUED | OUTPATIENT
Start: 2018-01-30 | End: 2018-02-02 | Stop reason: HOSPADM

## 2018-01-30 RX ORDER — TRAMADOL HYDROCHLORIDE 50 MG/1
50 TABLET ORAL EVERY 6 HOURS PRN
Status: DISCONTINUED | OUTPATIENT
Start: 2018-01-30 | End: 2018-02-02

## 2018-01-30 RX ORDER — MORPHINE SULFATE 2 MG/ML
2 INJECTION, SOLUTION INTRAMUSCULAR; INTRAVENOUS EVERY 4 HOURS PRN
Status: DISCONTINUED | OUTPATIENT
Start: 2018-01-30 | End: 2018-01-31

## 2018-01-30 RX ORDER — MORPHINE SULFATE 2 MG/ML
2 INJECTION, SOLUTION INTRAMUSCULAR; INTRAVENOUS ONCE
Status: COMPLETED | OUTPATIENT
Start: 2018-01-30 | End: 2018-01-30

## 2018-01-30 RX ADMIN — MORPHINE SULFATE 2 MG: 2 INJECTION, SOLUTION INTRAMUSCULAR; INTRAVENOUS at 10:42

## 2018-01-30 RX ADMIN — DILTIAZEM HYDROCHLORIDE 60 MG: 30 TABLET, FILM COATED ORAL at 13:58

## 2018-01-30 RX ADMIN — DILTIAZEM HYDROCHLORIDE 60 MG: 30 TABLET, FILM COATED ORAL at 10:47

## 2018-01-30 RX ADMIN — CYCLOBENZAPRINE HYDROCHLORIDE 5 MG: 10 TABLET, FILM COATED ORAL at 13:54

## 2018-01-30 RX ADMIN — OXYCODONE HYDROCHLORIDE 5 MG: 5 TABLET ORAL at 05:44

## 2018-01-30 RX ADMIN — Medication 10 ML: at 13:54

## 2018-01-30 RX ADMIN — OXYCODONE HYDROCHLORIDE 5 MG: 5 TABLET ORAL at 13:54

## 2018-01-30 RX ADMIN — CYCLOBENZAPRINE HYDROCHLORIDE 5 MG: 10 TABLET, FILM COATED ORAL at 20:45

## 2018-01-30 RX ADMIN — OXYCODONE HYDROCHLORIDE 5 MG: 5 TABLET ORAL at 20:44

## 2018-01-30 RX ADMIN — Medication 10 ML: at 23:08

## 2018-01-30 RX ADMIN — LOSARTAN POTASSIUM 50 MG: 50 TABLET ORAL at 10:52

## 2018-01-30 RX ADMIN — DILTIAZEM HYDROCHLORIDE 60 MG: 30 TABLET, FILM COATED ORAL at 20:45

## 2018-01-30 RX ADMIN — PANTOPRAZOLE SODIUM 40 MG: 40 TABLET, DELAYED RELEASE ORAL at 05:44

## 2018-01-30 RX ADMIN — ATORVASTATIN CALCIUM 40 MG: 40 TABLET, FILM COATED ORAL at 10:54

## 2018-01-30 RX ADMIN — DOCUSATE SODIUM 100 MG: 100 CAPSULE, LIQUID FILLED ORAL at 10:55

## 2018-01-30 RX ADMIN — CYCLOBENZAPRINE HYDROCHLORIDE 5 MG: 10 TABLET, FILM COATED ORAL at 10:46

## 2018-01-30 RX ADMIN — DOCUSATE SODIUM 100 MG: 100 CAPSULE, LIQUID FILLED ORAL at 20:45

## 2018-01-30 ASSESSMENT — PAIN SCALES - GENERAL
PAINLEVEL_OUTOF10: 10
PAINLEVEL_OUTOF10: 10
PAINLEVEL_OUTOF10: 8
PAINLEVEL_OUTOF10: 7

## 2018-01-30 ASSESSMENT — PAIN DESCRIPTION - LOCATION
LOCATION: WRIST;ARM
LOCATION: WRIST

## 2018-01-30 ASSESSMENT — PAIN DESCRIPTION - PAIN TYPE
TYPE: ACUTE PAIN
TYPE: ACUTE PAIN

## 2018-01-30 ASSESSMENT — PAIN DESCRIPTION - DESCRIPTORS
DESCRIPTORS: CONSTANT;STABBING
DESCRIPTORS: CONSTANT

## 2018-01-30 ASSESSMENT — PAIN DESCRIPTION - FREQUENCY: FREQUENCY: CONTINUOUS

## 2018-01-30 ASSESSMENT — PAIN DESCRIPTION - ORIENTATION
ORIENTATION: RIGHT
ORIENTATION: RIGHT

## 2018-01-30 ASSESSMENT — PAIN DESCRIPTION - ONSET: ONSET: ON-GOING

## 2018-01-30 NOTE — ED PROVIDER NOTES
rhonchi. No tenderness is exhibited. Abdominal: Soft. Bowel sounds are normal. No distension and no mass exhibitted. There is no tenderness. There is no rebound, rigidity or guarding. Genitourinary:   No CVA tenderness   Musculoskeletal: Right wrist with ecchymosis and swelling more pronounced over the distal radius. No crepitus or significant deformity. Tenderness without deformity or bruising the right greater trochanter in the area of her surgical scar  Neurological:   alert and oriented to person, place, and time. Reflexes are normal.  There are no cranial nerve deficits. Normal muscle tone exhibitted. Coordination normal.   Skin: Skin is warm and dry. No rash noted. No diaphoresis. No erythema. No pallor. there is a 1 cm jagged superficial laceration of the right forehead with no bleeding. Psychiatric:  normal mood and affect. Behavior is normal. Judgment and thought content normal.     DIAGNOSTIC RESULTS     EKG: All EKG's are interpreted by the Emergency Department Physician who either signs or Co-signs this chart in the absence of a cardiologist.    Not indicated    RADIOLOGY:   Non-plain film images such as CT, Ultrasound and MRI are read by the radiologist. Plain radiographic images are visualized and preliminarily interpreted by the emergency physician with the below findings:    X-ray of the right wrist shows a Colace fracture with impaction and no displacement    X-ray of the right hip shows a prosthesis with no fracture of the device or osseous structures    Interpretation per the Radiologist below, if available at the time of this note:    [address] [place] [address]   ED Interpretation   Negative for acute intracranial hemorrhage.   There is soft tissue swelling over the right frontal scalp      XR WRIST RIGHT (2 VIEWS)    (Results Pending)   XR HIP 2-3 VW W PELVIS RIGHT    (Results Pending)         ED BEDSIDE ULTRASOUND:   Performed by ED Physician - none    LABS:  Labs Reviewed   BASIC METABOLIC

## 2018-01-30 NOTE — ED TRIAGE NOTES
Patient arrives s/p fall at home earlier this evening. Patient states she was filling water pitcher at sink, turned with her walker and fell. Patient denies dizziness prior to fall, denies loc. A/0 x3, respirations even and unlabored on room air. Right wrist pain, buttock pain, hematoma to right forehead. Patient takes xarelto, hx afib. Physician at bedside. Call bell in reach.

## 2018-01-30 NOTE — PROGRESS NOTES
pain  Pain Location: Wrist  Pain Orientation: Right  Pain Descriptors: Constant (pt had difficulty describing)  Pain Frequency: Continuous  Clinical Progression: Not changed  Pain Intervention(s): Medication (see eMar), Cold applied  Multiple Pain Sites: Yes  Oxygen Therapy  SpO2: 98 %  O2 Device: None (Room air)  Social/Functional History  Social/Functional History  Lives With: Son (and grandson (upstairs))  Type of Home: House  Home Layout: Two level, Able to Live on Main level with bedroom/bathroom  Home Access: Stairs to enter with rails  Entrance Stairs - Number of Steps: 3  Entrance Stairs - Rails: Both (pt reports she holds onto the same rail with both hands)  Bathroom Shower/Tub: Walk-in shower (shower bench w/o back)  Bathroom Toilet: Standard  Bathroom Equipment: Grab bars around toilet, Grab bars in shower, Hand-held shower  Bathroom Accessibility: Accessible  Home Equipment: 4 wheeled walker, [employer] Help From: Family, Home health ([facility] 2x/wk- assists with showers, laundry)  ADL Assistance: Needs assistance  Bath: Maximum assistance  Dressing: Moderate assistance (LB, [facility]B (when showering))  Homemaking Responsibilities: Yes (gets \"Mom[employer] Meals\" who come 2x/wk and brink 2 wk supply of meals.  pt does prepare cereal for breakfast)  Meal Prep Responsibility: Secondary (light meal prep- cereal for breakfast only. 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)  Hearing: Exceptions to [facility]  Hearing Exceptions: Hard of hearing/hearing concerns    Orientation  Overall Orientation Status: Impaired  Orientation Level: Disoriented to time     Balance  Standing Balance: Minimal assistance  ADL  Feeding: Setup  Grooming: Minimal assistance  UE Bathing: less than 40 percent impaired, limited or restricted  OutComes Score                                           AM-PAC Score             Goals  Short term goals  Time Frame for Short term goals: 1-2wks  Short term goal 1: Increase to min/modA LB dressing tasks  Short term goal 2: Increase to CGA UB dressing tasks  Short term goal 3: Tolerate 5-7min standing endurance prior to fatigue to increase safety and I for ADL/Return to PLOF  Short term goal 4: Tolerate BUE AROM all ranges, all planes (to within pt's prior limited ranges), to increase I with dressing tasks  Short term goal 5: Increase to Spv toileting tasks  Long term goals  Time Frame for Long term goals : 2wks  Long term goal 1: Same as STGs  Long term goal 2: Same as STGs  Long term goal 3: Determine D/c plans  Patient Goals   Patient goals :  \"Be able to go home and do what I was doing before\"       Therapy Time   Individual Concurrent Group Co-treatment   Time In  8:45         Time Out  9:50         Minutes  [address] [place] [address]

## 2018-01-30 NOTE — H&P
Hospital Medicine History & Physical      PCP: Claudetta Gasmen, CNP    Date of Admission: 1/29/2018    Date of Service: 1/30/18      Chief Complaint:  Fall, R arm pain       History Of Present Illness:  80 y.o. female who presented to [facility] with above complains. Had mechanical fall on a kitchen yesterday, hit her head, R arm, R leg, was diagnosed with R radial fracture in ER and after stabilization were admitted for further management. Denied dizziness, LOC, fever, confusion prior or after fall      Past Medical History:          Diagnosis Date    Anxiety     Atrial fibrillation ([address]     Bilateral carotid artery stenosis 10/27/2017    Central retinal artery occlusion, right eye 10/17/2017    Chronic constipation     Chronic venous insufficiency     Dyslipidemia     Essential hypertension     HTN (hypertension) 6/29/2013    Neurodermatitis     chronic, of bilateral lower extremities    Osteoporosis     Pap test, as part of routine gynecological examination     years ago   [facility]     about 5 yrs ago    Seronegative rheumatoid arthritis ([address]     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 Painless. - Had CT head without contrast at OSH ED that shows: No acute intracranial abnormality. 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 - EKG shows sinus bradycardia with PAC(R-58) - Concern for retinal artery occlusion?> 12 hours have passed. - Afib related? Carotid bruit? - BP was 210/85 initially and then came down to 160/69 -Normal CRP - No other acute neuro deficit seen. -Spoke to Optho on AKYF([id number])- alon Villalta and he will arrange for pt to be seen today at [facility].  No acute intervention needed as pt well out of time frame(as per optho). Plan: - Admit to [facility] consult. Await optho input and consider further rx options after etiology is confirmed. - Fall precautions - Neuro checks - ECHO - Carotid doppler  Will monitor.  Vitamin D deficiency        Past Surgical History:          Procedure Laterality Date    ROTATOR CUFF REPAIR Right     TONSILLECTOMY      TOTAL HIP ARTHROPLASTY Right 2009       Medications Prior to Admission:      Prior to Admission medications    Medication Sig Start Date End Date Taking? Authorizing Provider   loratadine (CLARITIN) 10 MG tablet Take 10 mg by mouth daily    Historical Provider, MD   fluticasone (FLONASE) 50 MCG/ACT nasal spray 1 spray by Nasal route daily 1/25/18   Sundeep Bernard MD   omeprazole (PRILOSEC) 20 MG delayed release capsule Take 1 capsule by mouth Daily 1/25/18   Sundeep Bernard MD   diltiazem (CARDIZEM) 60 MG tablet Take 1 tablet by mouth 3 times daily 1/24/18   Milady Kirkland MD   metoprolol succinate (TOPROL XL) 25 MG extended release tablet TAKE 1 TABLET BY MOUTH ONCE DAILY 1/24/18   Milady Kirkland MD   docusate sodium (COLACE, DULCOLAX) 100 MG CAPS Take 100 mg by mouth 2 times daily 1/14/18   Nery Lewis MD   nystatin-triamcinolone [facility]) 524329-3.5 UNIT/GM-% cream Apply topically 4 times daily.  1/14/18   Nery Lewis MD   atorvastatin (LIPITOR) 40 MG tablet Take 1 tablet by mouth daily 12/26/17   Sharad Barakat CNP   losartan (COZAAR) 50 MG tablet Take 1 tablet by mouth daily 12/26/17   Sharad Barakat CNP   rivaroxaban (XARELTO) 15 MG TABS tablet Take 1 tablet by mouth daily (with breakfast) 12/6/17   Milady Kirkland MD   AMITIZA 8 MCG CAPS capsule TAKE 1 CAPSULE BY MOUTH ONCE DAILY 11/28/17   Sundeep Bernard MD   fluocinonide (LIDEX) 0.05 % cream Apply topically 2 times daily as needed (itching) 9/22/17   Sundeep Bernard MD   traMADol (ULTRAM) 50 MG tablet TAKE 1/2 TABLET BY MOUTH EVERY 4 TO 6 HOURS AS NEEDED FOR PAIN fracture- continue with pain meds, ortho to see pt today  History A fib- anticoagulation on hold before seen by ortho, rate controlled  HTN- stable  History of leg cellulitis- treated, has chronic skin changes, no signs of acute infection  Weakness, fall, may need rehab stay after DC- social service on case   Will follow along with ortho service   Stable for admission at [address] Bernard MD    Thank you Radha Montalvo CNP for the opportunity to be involved in this patient's care. If you have any questions or concerns please feel free to contact me.

## 2018-01-30 NOTE — PROGRESS NOTES
(degrees)  RUE General AROM: see OT eval  AROM LUE (degrees)  LUE General AROM: see OT eval  Strength RLE  Comment: hip 4/5 , increased pain with abduction noted; knee 4+/5, ankle 4+/5  Strength LLE  Comment: hip 4/5, knee 4+/5, ankle 4/5  Strength RUE  Comment: see OT eval  Strength LUE  Comment: see OTeval        Bed mobility  Comment: deferred due to increasing RUE fx pain, awaiting ortho  Transfers  Sit to Stand: Minimal Assistance;Contact guard assistance  Stand to sit: Minimal Assistance;Contact guard assistance  Comment: bed to bedsside commode Min A with gait belt and pt LUE on PT RUE for support with Min A x1; toileting clean up dependent  Ambulation  WB Status:  (deferred due toawaiting ortho consult for R radial fx, too much pain to walk, pt previously used ww)     Balance  Sitting - Dynamic: Good;-  Standing - Static: Fair;+  Standing - Dynamic: Fair;-  Comments: c/o R hip pain with stand for transfer to commode  Exercises  Comments: deferred as too much pain, educated ankle pumps to keep circulating     Assessment   Body structures, Functions, Activity limitations: Decreased functional mobility ; Decreased strength;Decreased endurance (pain in right radial fracture/ forearm/ wrist)  Assessment: 89yof recently in hospital December 2017 with new fall resulting in radial fracture R , R hip contusion, and CHI/ bruising R temple appropriate for PT follow , likely in need of [facility]/SNF as previously needed both UE to use ww and likely will be NWB RUE with new fracture, awaiting ortho consult.     Treatment Diagnosis: decreased fucntioal mobility post fall with R radius fx, R hip contusion, and R temple bruise  Prognosis: Fair  Patient Education: educated goals and treatment plan  REQUIRES PT FOLLOW UP: Yes  Activity Tolerance  Activity Tolerance: Patient limited by pain (pain in R radius foream/wrist)  PT Equipment Recommendations  Other: may need wide WILFRIDO hemiwalker one handle use for LUE support due to previous

## 2018-01-30 NOTE — PROGRESS NOTES
Chart reviewed and patient discussed in daily quality rounds. Patient is from home with son and grandson and was independent. Patient was recently in [facility] skilled unit discharging home on 1/14/18 with [facility] services. SS will follow with patient and family after she is seen by Ortho and PT evaluation is completed.

## 2018-01-31 ENCOUNTER — TELEPHONE (OUTPATIENT)
Dept: FAMILY MEDICINE CLINIC | Age: 83
End: 2018-01-31

## 2018-01-31 PROBLEM — S52.121A RADIAL HEAD FRACTURE, CLOSED, RIGHT, CLOSED, INITIAL ENCOUNTER: Status: ACTIVE | Noted: 2018-01-31

## 2018-01-31 PROBLEM — S52.91XA: Status: ACTIVE | Noted: 2018-01-31

## 2018-01-31 PROCEDURE — 97535 SELF CARE MNGMENT TRAINING: CPT

## 2018-01-31 PROCEDURE — 97530 THERAPEUTIC ACTIVITIES: CPT

## 2018-01-31 PROCEDURE — 2580000003 HC RX 258: Performed by: INTERNAL MEDICINE

## 2018-01-31 PROCEDURE — G0378 HOSPITAL OBSERVATION PER HR: HCPCS

## 2018-01-31 PROCEDURE — 1210000000 HC MED SURG R&B

## 2018-01-31 PROCEDURE — 6370000000 HC RX 637 (ALT 250 FOR IP): Performed by: INTERNAL MEDICINE

## 2018-01-31 PROCEDURE — 6360000002 HC RX W HCPCS: Performed by: INTERNAL MEDICINE

## 2018-01-31 RX ORDER — MORPHINE SULFATE 4 MG/ML
4 INJECTION, SOLUTION INTRAMUSCULAR; INTRAVENOUS
Status: DISCONTINUED | OUTPATIENT
Start: 2018-01-31 | End: 2018-02-01

## 2018-01-31 RX ORDER — LOSARTAN POTASSIUM 25 MG/1
25 TABLET ORAL DAILY
Status: DISCONTINUED | OUTPATIENT
Start: 2018-02-01 | End: 2018-02-01

## 2018-01-31 RX ORDER — MORPHINE SULFATE 2 MG/ML
2 INJECTION, SOLUTION INTRAMUSCULAR; INTRAVENOUS
Status: DISCONTINUED | OUTPATIENT
Start: 2018-01-31 | End: 2018-02-01

## 2018-01-31 RX ADMIN — Medication 10 ML: at 22:12

## 2018-01-31 RX ADMIN — OXYCODONE HYDROCHLORIDE 5 MG: 5 TABLET ORAL at 07:10

## 2018-01-31 RX ADMIN — DOCUSATE SODIUM 100 MG: 100 CAPSULE, LIQUID FILLED ORAL at 10:09

## 2018-01-31 RX ADMIN — OXYCODONE HYDROCHLORIDE 5 MG: 5 TABLET ORAL at 22:12

## 2018-01-31 RX ADMIN — CYCLOBENZAPRINE HYDROCHLORIDE 5 MG: 10 TABLET, FILM COATED ORAL at 10:16

## 2018-01-31 RX ADMIN — OXYCODONE HYDROCHLORIDE 5 MG: 5 TABLET ORAL at 11:26

## 2018-01-31 RX ADMIN — RIVAROXABAN 15 MG: 15 TABLET, FILM COATED ORAL at 18:39

## 2018-01-31 RX ADMIN — ATORVASTATIN CALCIUM 40 MG: 40 TABLET, FILM COATED ORAL at 10:10

## 2018-01-31 RX ADMIN — DILTIAZEM HYDROCHLORIDE 60 MG: 30 TABLET, FILM COATED ORAL at 22:08

## 2018-01-31 RX ADMIN — MORPHINE SULFATE 2 MG: 2 INJECTION, SOLUTION INTRAMUSCULAR; INTRAVENOUS at 15:15

## 2018-01-31 RX ADMIN — CYCLOBENZAPRINE HYDROCHLORIDE 5 MG: 10 TABLET, FILM COATED ORAL at 14:31

## 2018-01-31 RX ADMIN — OXYCODONE HYDROCHLORIDE 2.5 MG: 5 TABLET ORAL at 07:13

## 2018-01-31 RX ADMIN — DOCUSATE SODIUM 100 MG: 100 CAPSULE, LIQUID FILLED ORAL at 22:08

## 2018-01-31 RX ADMIN — CYCLOBENZAPRINE HYDROCHLORIDE 5 MG: 10 TABLET, FILM COATED ORAL at 22:07

## 2018-01-31 RX ADMIN — LOSARTAN POTASSIUM 50 MG: 50 TABLET ORAL at 10:09

## 2018-01-31 RX ADMIN — Medication 10 ML: at 10:17

## 2018-01-31 RX ADMIN — PANTOPRAZOLE SODIUM 40 MG: 40 TABLET, DELAYED RELEASE ORAL at 07:10

## 2018-01-31 RX ADMIN — DILTIAZEM HYDROCHLORIDE 60 MG: 30 TABLET, FILM COATED ORAL at 14:29

## 2018-01-31 ASSESSMENT — PAIN DESCRIPTION - LOCATION
LOCATION: ARM
LOCATION: WRIST
LOCATION: ARM
LOCATION: ARM
LOCATION_2: HEAD
LOCATION: ARM

## 2018-01-31 ASSESSMENT — PAIN DESCRIPTION - INTENSITY
RATING_2: 0

## 2018-01-31 ASSESSMENT — PAIN DESCRIPTION - PAIN TYPE
TYPE: ACUTE PAIN
TYPE_2: ACUTE PAIN
TYPE: ACUTE PAIN

## 2018-01-31 ASSESSMENT — PAIN DESCRIPTION - ORIENTATION
ORIENTATION: RIGHT
ORIENTATION: RIGHT;DISTAL
ORIENTATION: RIGHT
ORIENTATION: RIGHT

## 2018-01-31 ASSESSMENT — PAIN DESCRIPTION - PROGRESSION
CLINICAL_PROGRESSION: GRADUALLY WORSENING
CLINICAL_PROGRESSION: NOT CHANGED
CLINICAL_PROGRESSION: NOT CHANGED
CLINICAL_PROGRESSION: GRADUALLY WORSENING
CLINICAL_PROGRESSION: GRADUALLY WORSENING
CLINICAL_PROGRESSION: NOT CHANGED
CLINICAL_PROGRESSION: NOT CHANGED
CLINICAL_PROGRESSION: GRADUALLY WORSENING
CLINICAL_PROGRESSION: NOT CHANGED
CLINICAL_PROGRESSION: NOT CHANGED
CLINICAL_PROGRESSION: GRADUALLY WORSENING
CLINICAL_PROGRESSION: GRADUALLY IMPROVING
CLINICAL_PROGRESSION: NOT CHANGED
CLINICAL_PROGRESSION: GRADUALLY IMPROVING
CLINICAL_PROGRESSION: NOT CHANGED

## 2018-01-31 ASSESSMENT — PAIN SCALES - GENERAL
PAINLEVEL_OUTOF10: 7
PAINLEVEL_OUTOF10: 0
PAINLEVEL_OUTOF10: 6
PAINLEVEL_OUTOF10: 9
PAINLEVEL_OUTOF10: 3
PAINLEVEL_OUTOF10: 5
PAINLEVEL_OUTOF10: 3
PAINLEVEL_OUTOF10: 4
PAINLEVEL_OUTOF10: 4
PAINLEVEL_OUTOF10: 6
PAINLEVEL_OUTOF10: 0
PAINLEVEL_OUTOF10: 9
PAINLEVEL_OUTOF10: 3
PAINLEVEL_OUTOF10: 1
PAINLEVEL_OUTOF10: 1
PAINLEVEL_OUTOF10: 3
PAINLEVEL_OUTOF10: 0
PAINLEVEL_OUTOF10: 6

## 2018-01-31 ASSESSMENT — PAIN DESCRIPTION - DESCRIPTORS
DESCRIPTORS: CONSTANT;ACHING
DESCRIPTORS: SHOOTING;THROBBING
DESCRIPTORS: SHARP;STABBING
DESCRIPTORS: SHARP;SHOOTING
DESCRIPTORS: SHARP;SHOOTING
DESCRIPTORS: SHARP;STABBING

## 2018-01-31 ASSESSMENT — PAIN DESCRIPTION - ONSET
ONSET: GRADUAL
ONSET: ON-GOING
ONSET: ON-GOING
ONSET: GRADUAL
ONSET: GRADUAL

## 2018-01-31 ASSESSMENT — PAIN DESCRIPTION - FREQUENCY
FREQUENCY: INTERMITTENT
FREQUENCY: CONTINUOUS
FREQUENCY: INTERMITTENT
FREQUENCY: CONTINUOUS
FREQUENCY: INTERMITTENT
FREQUENCY: INTERMITTENT

## 2018-01-31 NOTE — PROGRESS NOTES
Ortho  Consult dict  Rt distal radial styloid fx  Intra articular but essentially non displaced   plan  Short arm thumb spica cast for  5 to 6 weeks  nwb rt hand but may use a platform walker  Cast care  Sling when oob or platform walker if tolerated    F/u steven bryant in 2 to 3 weeks

## 2018-01-31 NOTE — PROGRESS NOTES
Physical Therapy  Facility/Department: [facility] MED SURG UNIT  Daily Treatment Note  NAME: Anthony Sosa  : 10/9/1926  MRN: [id number]    Date of Service: 2018    Patient Diagnosis(es):   Patient Active Problem List    Diagnosis Date Noted    Closed right radial fracture, closed, initial encounter 2018    Radial head fracture, closed, right, closed, initial encounter 2018    Radial head fracture, closed, left, closed, initial encounter 2018    Cellulitis of right leg 2018    Cellulitis and abscess of right lower extremity 2018    Bilateral carotid artery stenosis 10/27/2017    Central retinal artery occlusion, right eye 10/17/2017    Vision loss of right eye 10/17/2017    Status post total replacement of right hip 2017    Varicose veins of both lower extremities 2017    Bruit of left carotid artery 2017    Atrial fibrillation ([address] 2014    HTN (hypertension) 2013    Peripheral edema 2013    Vitamin D deficiency     Dyslipidemia     Osteoporosis     Chronic constipation     Neurodermatitis     Chronic venous insufficiency     Anxiety     Seronegative rheumatoid arthritis ([address]        Past Medical History:   Diagnosis Date    Anxiety     Atrial fibrillation ([address]     Bilateral carotid artery stenosis 10/27/2017    Central retinal artery occlusion, right eye 10/17/2017    Chronic constipation     Chronic venous insufficiency     Dyslipidemia     Essential hypertension     HTN (hypertension) 2013    Neurodermatitis     chronic, of bilateral lower extremities    Osteoporosis     Pap test, as part of routine gynecological examination     years ago   [facility] Screening mammogram     about 5 yrs ago    Seronegative rheumatoid arthritis ([address]     Status post total replacement of right hip 2017    Vision loss of right eye 10/17/2017    Overview:  - 80year old female who presents with PMH of HTN,AFIB(declined AC/aspirin),HLP presented to OSH yesterday evening with C/O right eye vision loss around 5 pm yesterday. Painless. - Had CT head without contrast at OSH ED that shows: No acute intracranial abnormality. 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 - EKG shows sinus bradycardia with PAC(R-58) - Concern for retinal artery occlusion?> 12 hours have passed. - Afib related? Carotid bruit? - BP was 210/85 initially and then came down to 160/69 -Normal CRP - No other acute neuro deficit seen. -Spoke to Optho on BRDP([id number])- alon Villalta and he will arrange for pt to be seen today at [facility]. No acute intervention needed as pt well out of time frame(as per optho). Plan: - Admit to [facility] consult. Await optho input and consider further rx options after etiology is confirmed. - Fall precautions - Neuro checks - ECHO - Carotid doppler  Will monitor.  Vitamin D deficiency      Past Surgical History:   Procedure Laterality Date    ROTATOR CUFF REPAIR Right     TONSILLECTOMY      TOTAL HIP ARTHROPLASTY Right 2009       Restrictions  Restrictions/Precautions  Restrictions/Precautions: Fall Risk  Required Braces or Orthoses?:    Implants present? : Metal implants (R THR 2017)  Upper Extremity Weight Bearing Restrictions  Right Upper Extremity   Required Braces or Orthoses  Left Lower Extremity Brace:  Loli Foot Orthotics (when up )  Right Upper Extremity Brace/Splint:  (soft cast/ ace wrap awaiting ortho consult for radial fracture)  Subjective   General  Chart Reviewed: Yes  Family / Caregiver Present: No  Referring Practitioner: Dr Delano Cushing: (P) pt sitting in chair upon arrival and agreeable to therapy  Pain Screening  Patient Currently in Pain: (P) Yes  Pain Assessment  9/10         Orientation     Objective     Scooting: Maximal assistance  Transfers  Sit to Stand: (P) Maximum Assistance (max A x2)  Stand Pivot Transfers: (P) Maximum Assistance devices:  All fall risk precautions in place, Call light within reach, Bed alarm in place, Left in bed, Nurse notified  Restraints  Initially in place: Yes     Therapy Time   Individual Concurrent Group Co-treatment   Time In  [address], PTA  License and [address] Number: [id number]

## 2018-01-31 NOTE — PROGRESS NOTES
Physical Therapy  Facility/Department: [facility] MED SURG UNIT  Daily Treatment Note  NAME: Leandro Owen  : 10/9/1926  MRN: [id number]    Date of Service: 2018    Patient Diagnosis(es):   Patient Active Problem List    Diagnosis Date Noted    Closed right radial fracture, closed, initial encounter 2018    Radial head fracture, closed, right, closed, initial encounter 2018    Radial head fracture, closed, left, closed, initial encounter 2018    Cellulitis of right leg 2018    Cellulitis and abscess of right lower extremity 2018    Bilateral carotid artery stenosis 10/27/2017    Central retinal artery occlusion, right eye 10/17/2017    Vision loss of right eye 10/17/2017    Status post total replacement of right hip 2017    Varicose veins of both lower extremities 2017    Bruit of left carotid artery 2017    Atrial fibrillation ([address] 2014    HTN (hypertension) 2013    Peripheral edema 2013    Vitamin D deficiency     Dyslipidemia     Osteoporosis     Chronic constipation     Neurodermatitis     Chronic venous insufficiency     Anxiety     Seronegative rheumatoid arthritis ([address]        Past Medical History:   Diagnosis Date    Anxiety     Atrial fibrillation ([address]     Bilateral carotid artery stenosis 10/27/2017    Central retinal artery occlusion, right eye 10/17/2017    Chronic constipation     Chronic venous insufficiency     Dyslipidemia     Essential hypertension     HTN (hypertension) 2013    Neurodermatitis     chronic, of bilateral lower extremities    Osteoporosis     Pap test, as part of routine gynecological examination     years ago   [facility] Screening mammogram     about 5 yrs ago    Seronegative rheumatoid arthritis ([address]     Status post total replacement of right hip 2017    Vision loss of right eye 10/17/2017    Overview:  - 80year old female who presents with PMH of HTN,AFIB(declined AC/aspirin),HLP presented to OSH yesterday evening with C/O right eye vision loss around 5 pm yesterday. Painless. - Had CT head without contrast at OSH ED that shows: No acute intracranial abnormality. 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 - EKG shows sinus bradycardia with PAC(R-58) - Concern for retinal artery occlusion?> 12 hours have passed. - Afib related? Carotid bruit? - BP was 210/85 initially and then came down to 160/69 -Normal CRP - No other acute neuro deficit seen. -Spoke to Optho on TNAB([id number])- alon Villalta and he will arrange for pt to be seen today at [facility]. No acute intervention needed as pt well out of time frame(as per optho). Plan: - Admit to [facility] consult. Await optho input and consider further rx options after etiology is confirmed. - Fall precautions - Neuro checks - ECHO - Carotid doppler  Will monitor.  Vitamin D deficiency      Past Surgical History:   Procedure Laterality Date    ROTATOR CUFF REPAIR Right     TONSILLECTOMY      TOTAL HIP ARTHROPLASTY Right 2009       Restrictions  Restrictions/Precautions  Restrictions/Precautions: Fall Risk  Required Braces or Orthoses?:  (R spica cast requires sling when OOB )  Implants present? : Metal implants (R THR 2017)  Upper Extremity Weight Bearing Restrictions  Right Upper Extremity Weight Bearing: Non Weight Bearing (may use platform walker if tolerated )  Required Braces or Orthoses  Left Lower Extremity Brace: Loli Foot Orthotics (when up )  Right Upper Extremity Brace/Splint:  (spica cast ), sling when OOB   Subjective   General  Chart Reviewed: Yes  Family / Caregiver Present: No  Referring Practitioner: Dr Balwinder Elizabeth: Pt. limited by pain. Pt. up in chair falling asleep and requests back to bed. Pt. repotrts pain 9/10.   Notified nursing,           Orientation     Objective      Transfers  Bed to Chair: Maximum assistance (x2)  Comment: ELAINE LOCKHART in Co-treatment   Time In  1400         Time Out  1430         Minutes  [address], [place]  License and [address] Number: [id number]

## 2018-01-31 NOTE — PROGRESS NOTES
equal bilaterally       Labs:   Recent Labs      01/29/18 2053 01/30/18   0549   WBC  8.7  10.9*   HGB  13.2  11.1*   HCT  38.6  33.1*   PLT  195  168     Recent Labs      01/29/18 2053 01/30/18   0549   NA  135  134   K  4.8  5.3*   CL  98  99   CO2  23  25   BUN  19  17   CREATININE  0.69  0.56   CALCIUM  9.8  9.1     No results for input(s): AST, ALT, BILIDIR, BILITOT, ALKPHOS in the last 72 hours. No results for input(s): INR in the last 72 hours. No results for input(s): Sudarshan Dus in the last 72 hours. Urinalysis:    Lab Results   Component Value Date    NITRU Negative 11/19/2013    WBCUA 0-2 11/19/2013    BACTERIA Rare 11/19/2013    RBCUA 0-2 11/19/2013    BLOODU SMALL 11/19/2013    SPECGRAV 1.015 11/19/2013    GLUCOSEU Negative 11/19/2013       Radiology:  XR WRIST RIGHT (2 VIEWS)   Final Result   ACUTE COMMINUTED FRACTURE WITH INTRA-ARTICULAR EXTENSION OF THE RIGHT DISTAL RADIUS      XR HIP 2-3 VW W PELVIS RIGHT   Final Result   NO ACUTE FRACTURE. CT HEAD WO CONTRAST   ED Interpretation   Negative for acute intracranial hemorrhage. There is soft tissue swelling over the right frontal scalp      Final Result      NO ACUTE INTRA-AXIAL OR EXTRA-AXIAL FINDINGS. All CT scans at this facility use dose modulation, iterative reconstruction, and/or weight based dosing when appropriate to reduce radiation dose to as low as reasonably achievable.                     Assessment/Plan:    Active Hospital Problems    Diagnosis Date Noted    Radial head fracture, closed, left, closed, initial encounter Remy Estimable 01/29/2018    Atrial fibrillation ([facility] [address] [I48.91] 06/30/2014    HTN (hypertension) [I10] 06/29/2013         DVT Prophylaxis: on hold   Diet: DIET GENERAL;  Code Status: Full Code    PT/OT Eval Status: done    Dispo - Mechanical fall with R radial fracture- continue with pain meds, ortho to see pt today  History A fib- anticoagulation on hold before seen by ortho, rate

## 2018-01-31 NOTE — TELEPHONE ENCOUNTER
Hunter Mcdonnell called from [facility] to advise you that patient broke her wrist due to a fall on 1/29/2018; patient was admitted to [facility] 1/29/2018 and maybe transitioning to skilled facility but not 100 % determined at this time.

## 2018-02-01 PROCEDURE — 1210000000 HC MED SURG R&B

## 2018-02-01 PROCEDURE — 97530 THERAPEUTIC ACTIVITIES: CPT

## 2018-02-01 PROCEDURE — 97110 THERAPEUTIC EXERCISES: CPT

## 2018-02-01 PROCEDURE — 6370000000 HC RX 637 (ALT 250 FOR IP): Performed by: INTERNAL MEDICINE

## 2018-02-01 PROCEDURE — 2580000003 HC RX 258: Performed by: INTERNAL MEDICINE

## 2018-02-01 PROCEDURE — G0378 HOSPITAL OBSERVATION PER HR: HCPCS

## 2018-02-01 RX ORDER — MORPHINE SULFATE 2 MG/ML
1 INJECTION, SOLUTION INTRAMUSCULAR; INTRAVENOUS EVERY 4 HOURS PRN
Status: DISCONTINUED | OUTPATIENT
Start: 2018-02-01 | End: 2018-02-02 | Stop reason: HOSPADM

## 2018-02-01 RX ORDER — LIDOCAINE 50 MG/G
1 PATCH TOPICAL DAILY
Status: DISCONTINUED | OUTPATIENT
Start: 2018-02-01 | End: 2018-02-02 | Stop reason: HOSPADM

## 2018-02-01 RX ORDER — SODIUM CHLORIDE 9 MG/ML
INJECTION, SOLUTION INTRAVENOUS
Status: DISCONTINUED
Start: 2018-02-01 | End: 2018-02-02

## 2018-02-01 RX ORDER — 0.9 % SODIUM CHLORIDE 0.9 %
500 INTRAVENOUS SOLUTION INTRAVENOUS ONCE
Status: COMPLETED | OUTPATIENT
Start: 2018-02-01 | End: 2018-02-01

## 2018-02-01 RX ADMIN — OXYCODONE HYDROCHLORIDE 5 MG: 5 TABLET ORAL at 08:27

## 2018-02-01 RX ADMIN — DOCUSATE SODIUM 100 MG: 100 CAPSULE, LIQUID FILLED ORAL at 08:28

## 2018-02-01 RX ADMIN — LOSARTAN POTASSIUM 25 MG: 25 TABLET ORAL at 08:27

## 2018-02-01 RX ADMIN — CYCLOBENZAPRINE HYDROCHLORIDE 5 MG: 10 TABLET, FILM COATED ORAL at 08:28

## 2018-02-01 RX ADMIN — ATORVASTATIN CALCIUM 40 MG: 40 TABLET, FILM COATED ORAL at 20:38

## 2018-02-01 RX ADMIN — DOCUSATE SODIUM 100 MG: 100 CAPSULE, LIQUID FILLED ORAL at 20:38

## 2018-02-01 RX ADMIN — CYCLOBENZAPRINE HYDROCHLORIDE 5 MG: 10 TABLET, FILM COATED ORAL at 20:38

## 2018-02-01 RX ADMIN — DILTIAZEM HYDROCHLORIDE 60 MG: 30 TABLET, FILM COATED ORAL at 08:27

## 2018-02-01 RX ADMIN — TRAMADOL HYDROCHLORIDE 50 MG: 50 TABLET, COATED ORAL at 12:24

## 2018-02-01 RX ADMIN — PANTOPRAZOLE SODIUM 40 MG: 40 TABLET, DELAYED RELEASE ORAL at 08:28

## 2018-02-01 RX ADMIN — SODIUM CHLORIDE 500 ML: 900 INJECTION, SOLUTION INTRAVENOUS at 18:52

## 2018-02-01 RX ADMIN — RIVAROXABAN 15 MG: 15 TABLET, FILM COATED ORAL at 18:21

## 2018-02-01 RX ADMIN — DILTIAZEM HYDROCHLORIDE 60 MG: 30 TABLET, FILM COATED ORAL at 20:38

## 2018-02-01 ASSESSMENT — PAIN DESCRIPTION - PROGRESSION

## 2018-02-01 ASSESSMENT — PAIN SCALES - GENERAL
PAINLEVEL_OUTOF10: 7
PAINLEVEL_OUTOF10: 6
PAINLEVEL_OUTOF10: 7

## 2018-02-01 ASSESSMENT — PAIN DESCRIPTION - LOCATION: LOCATION: ARM

## 2018-02-01 ASSESSMENT — PAIN SCALES - WONG BAKER: WONGBAKER_NUMERICALRESPONSE: 10

## 2018-02-01 ASSESSMENT — PAIN DESCRIPTION - ORIENTATION: ORIENTATION: RIGHT

## 2018-02-01 ASSESSMENT — PAIN DESCRIPTION - PAIN TYPE: TYPE: ACUTE PAIN

## 2018-02-01 NOTE — PROGRESS NOTES
supervision or assist (Nursing home/LTC recommendations at this time)     Plan   Plan  Times per week: 3-6  Times per day: Daily  Plan weeks: 2  Current Treatment Recommendations: Strengthening, ROM, Functional Mobility Training, Endurance Training, Neuromuscular Re-education, Patient/Caregiver Education & Training, Safety Education & Training, Self-Care / ADL  G-Code     OutComes Score                                           AM-PAC Score             Goals  Short term goals  Time Frame for Short term goals: 1-2wks  Short term goal 1: Tolerate LUE ther act x5-10min to increase I for ADL  Short term goal 2: Increase to modA fxl xfers  Short term goal 3: Tolerate 1min standing endurance prior to fatigue to increase safety and I for fxl xfers  Short term goal 4: Tolerate BUE AROM all ranges, all planes (to within pt's prior limited ranges), to increase I with dressing tasks  Short term goal 5: Increase to Spv toileting tasks  Long term goals  Time Frame for Long term goals : 2wks  Long term goal 1: Same as STGs  Long term goal 2: Same as STGs  Long term goal 3: Determine D/c plans  Patient Goals   Patient goals :  \"Be able to go home and do what I was doing before\"       Therapy Time   Individual Concurrent Group Co-treatment   Time In  10:15/12:40         Time Out  10:30/1:00         Minutes  15+20 = 35      [address], [place]

## 2018-02-01 NOTE — PROGRESS NOTES
)  Stand to sit: Minimal Assistance  Bed to Chair: Maximum assistance (x2 with retropulsion/ resistance )  Stand Pivot Transfers: Maximum Assistance (x2)  Comment: R UE sling, L AFO, very painful  and resists transfer   Ambulation  Ambulation?: No (too. much pain )        Exercises  Quad Sets: x10 supine   Gluteal Sets: x10   Ankle Pumps: x10 supine   Comments: Pt. limited by pain with all mobility educated importance of participation to decrease risk of further functional decline            Cryotherapy (Minutes\Location): x15 min post to R arm and hip  to decrease pain   Other: KT tape fan strip to R hip/ bruise 0% tension to assist with healing. Assessment   Body structures, Functions, Activity limitations: Decreased functional mobility ; Decreased strength;Decreased endurance  Assessment: Pt. very painful  with all mobility. Attempted therex pt. limited by pain.       Treatment Diagnosis: decreased fucntioal mobility post fall with R radius fx, R hip contusion, and R temple bruise  Prognosis: Fair  Patient Education: educated AP for circulation   Barriers to Learning:  (pain )  REQUIRES PT FOLLOW UP: Yes  Activity Tolerance  Activity Tolerance: Patient limited by pain  PT Equipment Recommendations  Other: may need wide WILFRIDO hemiwalker one handle use for LUE support due to previous use of ww for mobility and likely NWB RUE with new fracture       Discharge Recommendations:  Subacute/Skilled Nursing Facility    G-Code     OutComes Score                                                    AM-PAC Score             Goals  Short term goals  Time Frame for Short term goals: 3-5 days  Short term goal 1: sit to stand with <= Min A x1   Short term goal 2: Ambulate with hemicane(wide WILFRIDO) left 20ft< =Deonte x1 person  Short term goal 3: tolerate 2x10 reps LE LUDIN to increase strength for functional mobility  Long term goals  Time Frame for Long term goals : samea as STG medical/ OBS patient  Patient Goals   Patient

## 2018-02-02 ENCOUNTER — HOSPITAL ENCOUNTER (INPATIENT)
Age: 83
LOS: 13 days | Discharge: NURSING, REHAB OR LONG-TERM CARE | DRG: 560 | End: 2018-02-15
Attending: INTERNAL MEDICINE | Admitting: INTERNAL MEDICINE
Payer: COMMERCIAL

## 2018-02-02 VITALS
SYSTOLIC BLOOD PRESSURE: 128 MMHG | OXYGEN SATURATION: 100 % | TEMPERATURE: 98.1 F | HEIGHT: 58 IN | WEIGHT: 119.49 LBS | RESPIRATION RATE: 16 BRPM | DIASTOLIC BLOOD PRESSURE: 45 MMHG | BODY MASS INDEX: 25.08 KG/M2 | HEART RATE: 79 BPM

## 2018-02-02 DIAGNOSIS — S52.121A RADIAL HEAD FRACTURE, CLOSED, RIGHT, CLOSED, INITIAL ENCOUNTER: Primary | ICD-10-CM

## 2018-02-02 LAB
ANION GAP SERPL CALCULATED.3IONS-SCNC: 12 MEQ/L (ref 7–13)
BASOPHILS ABSOLUTE: 0 K/UL (ref 0–0.2)
BASOPHILS RELATIVE PERCENT: 0.3 %
BUN BLDV-MCNC: 21 MG/DL (ref 8–23)
CALCIUM SERPL-MCNC: 8.6 MG/DL (ref 8.6–10.2)
CHLORIDE BLD-SCNC: 94 MEQ/L (ref 98–107)
CO2: 24 MEQ/L (ref 22–29)
CREAT SERPL-MCNC: 0.52 MG/DL (ref 0.5–0.9)
EOSINOPHILS ABSOLUTE: 0.2 K/UL (ref 0–0.7)
EOSINOPHILS RELATIVE PERCENT: 1.6 %
GFR AFRICAN AMERICAN: >60
GFR NON-AFRICAN AMERICAN: >60
GLUCOSE BLD-MCNC: 109 MG/DL (ref 74–109)
HCT VFR BLD CALC: 26.1 % (ref 37–47)
HEMOGLOBIN: 9.1 G/DL (ref 12–16)
LYMPHOCYTES ABSOLUTE: 0.8 K/UL (ref 1–4.8)
LYMPHOCYTES RELATIVE PERCENT: 6.9 %
MCH RBC QN AUTO: 33.3 PG (ref 27–31.3)
MCHC RBC AUTO-ENTMCNC: 34.6 % (ref 33–37)
MCV RBC AUTO: 96.2 FL (ref 82–100)
MONOCYTES ABSOLUTE: 1.4 K/UL (ref 0.2–0.8)
MONOCYTES RELATIVE PERCENT: 12.9 %
NEUTROPHILS ABSOLUTE: 8.8 K/UL (ref 1.4–6.5)
NEUTROPHILS RELATIVE PERCENT: 78.3 %
PDW BLD-RTO: 13.1 % (ref 11.5–14.5)
PLATELET # BLD: 164 K/UL (ref 130–400)
POTASSIUM SERPL-SCNC: 4.6 MEQ/L (ref 3.5–5.1)
RBC # BLD: 2.72 M/UL (ref 4.2–5.4)
SODIUM BLD-SCNC: 130 MEQ/L (ref 132–144)
WBC # BLD: 11.2 K/UL (ref 4.8–10.8)

## 2018-02-02 PROCEDURE — 2580000003 HC RX 258: Performed by: INTERNAL MEDICINE

## 2018-02-02 PROCEDURE — 6360000002 HC RX W HCPCS: Performed by: INTERNAL MEDICINE

## 2018-02-02 PROCEDURE — 36415 COLL VENOUS BLD VENIPUNCTURE: CPT

## 2018-02-02 PROCEDURE — 80048 BASIC METABOLIC PNL TOTAL CA: CPT

## 2018-02-02 PROCEDURE — 6370000000 HC RX 637 (ALT 250 FOR IP): Performed by: INTERNAL MEDICINE

## 2018-02-02 PROCEDURE — 1200000000 HC SEMI PRIVATE

## 2018-02-02 PROCEDURE — G0378 HOSPITAL OBSERVATION PER HR: HCPCS

## 2018-02-02 PROCEDURE — 97110 THERAPEUTIC EXERCISES: CPT

## 2018-02-02 PROCEDURE — 85025 COMPLETE CBC W/AUTO DIFF WBC: CPT

## 2018-02-02 PROCEDURE — 97530 THERAPEUTIC ACTIVITIES: CPT

## 2018-02-02 RX ORDER — CYCLOBENZAPRINE HCL 10 MG
5 TABLET ORAL 3 TIMES DAILY
Status: CANCELLED | OUTPATIENT
Start: 2018-02-02

## 2018-02-02 RX ORDER — ACETAMINOPHEN 325 MG/1
650 TABLET ORAL EVERY 4 HOURS PRN
Status: CANCELLED | OUTPATIENT
Start: 2018-02-02

## 2018-02-02 RX ORDER — OXYCODONE HYDROCHLORIDE 5 MG/1
2.5 TABLET ORAL EVERY 4 HOURS PRN
Status: CANCELLED | OUTPATIENT
Start: 2018-02-02

## 2018-02-02 RX ORDER — ATORVASTATIN CALCIUM 40 MG/1
40 TABLET, FILM COATED ORAL DAILY
Status: CANCELLED | OUTPATIENT
Start: 2018-02-02

## 2018-02-02 RX ORDER — CYCLOBENZAPRINE HCL 10 MG
5 TABLET ORAL 3 TIMES DAILY
Status: DISCONTINUED | OUTPATIENT
Start: 2018-02-02 | End: 2018-02-15 | Stop reason: HOSPADM

## 2018-02-02 RX ORDER — ONDANSETRON 2 MG/ML
4 INJECTION INTRAMUSCULAR; INTRAVENOUS EVERY 6 HOURS PRN
Status: CANCELLED | OUTPATIENT
Start: 2018-02-02

## 2018-02-02 RX ORDER — PANTOPRAZOLE SODIUM 40 MG/1
40 TABLET, DELAYED RELEASE ORAL
Status: CANCELLED | OUTPATIENT
Start: 2018-02-03

## 2018-02-02 RX ORDER — OXYCODONE HYDROCHLORIDE 5 MG/1
5 TABLET ORAL EVERY 4 HOURS PRN
Status: DISCONTINUED | OUTPATIENT
Start: 2018-02-02 | End: 2018-02-15 | Stop reason: HOSPADM

## 2018-02-02 RX ORDER — OXYCODONE HYDROCHLORIDE 5 MG/1
5 TABLET ORAL EVERY 4 HOURS PRN
Status: CANCELLED | OUTPATIENT
Start: 2018-02-02

## 2018-02-02 RX ORDER — TRAMADOL HYDROCHLORIDE 50 MG/1
50 TABLET ORAL EVERY 6 HOURS SCHEDULED
Status: DISCONTINUED | OUTPATIENT
Start: 2018-02-02 | End: 2018-02-02 | Stop reason: HOSPADM

## 2018-02-02 RX ORDER — ACETAMINOPHEN 325 MG/1
650 TABLET ORAL EVERY 4 HOURS PRN
Status: DISCONTINUED | OUTPATIENT
Start: 2018-02-02 | End: 2018-02-15 | Stop reason: HOSPADM

## 2018-02-02 RX ORDER — SODIUM CHLORIDE 0.9 % (FLUSH) 0.9 %
10 SYRINGE (ML) INJECTION PRN
Status: DISCONTINUED | OUTPATIENT
Start: 2018-02-02 | End: 2018-02-15 | Stop reason: HOSPADM

## 2018-02-02 RX ORDER — TRAMADOL HYDROCHLORIDE 50 MG/1
50 TABLET ORAL EVERY 6 HOURS SCHEDULED
Status: DISCONTINUED | OUTPATIENT
Start: 2018-02-02 | End: 2018-02-15 | Stop reason: HOSPADM

## 2018-02-02 RX ORDER — ATORVASTATIN CALCIUM 40 MG/1
40 TABLET, FILM COATED ORAL DAILY
Status: DISCONTINUED | OUTPATIENT
Start: 2018-02-02 | End: 2018-02-15 | Stop reason: HOSPADM

## 2018-02-02 RX ORDER — PANTOPRAZOLE SODIUM 40 MG/1
40 TABLET, DELAYED RELEASE ORAL
Status: DISCONTINUED | OUTPATIENT
Start: 2018-02-03 | End: 2018-02-15 | Stop reason: HOSPADM

## 2018-02-02 RX ORDER — OXYCODONE HYDROCHLORIDE 5 MG/1
2.5 TABLET ORAL EVERY 4 HOURS PRN
Status: DISCONTINUED | OUTPATIENT
Start: 2018-02-02 | End: 2018-02-15 | Stop reason: HOSPADM

## 2018-02-02 RX ORDER — LIDOCAINE 50 MG/G
1 PATCH TOPICAL DAILY
Status: CANCELLED | OUTPATIENT
Start: 2018-02-03

## 2018-02-02 RX ORDER — ONDANSETRON 2 MG/ML
4 INJECTION INTRAMUSCULAR; INTRAVENOUS EVERY 6 HOURS PRN
Status: DISCONTINUED | OUTPATIENT
Start: 2018-02-02 | End: 2018-02-15 | Stop reason: HOSPADM

## 2018-02-02 RX ORDER — LIDOCAINE 50 MG/G
1 PATCH TOPICAL DAILY
Status: DISCONTINUED | OUTPATIENT
Start: 2018-02-03 | End: 2018-02-15 | Stop reason: HOSPADM

## 2018-02-02 RX ORDER — TRAMADOL HYDROCHLORIDE 50 MG/1
50 TABLET ORAL EVERY 6 HOURS SCHEDULED
Status: CANCELLED | OUTPATIENT
Start: 2018-02-02

## 2018-02-02 RX ORDER — TRAMADOL HYDROCHLORIDE 50 MG/1
50 TABLET ORAL 4 TIMES DAILY
Status: DISCONTINUED | OUTPATIENT
Start: 2018-02-02 | End: 2018-02-02

## 2018-02-02 RX ORDER — DOCUSATE SODIUM 100 MG/1
100 CAPSULE, LIQUID FILLED ORAL 2 TIMES DAILY
Status: CANCELLED | OUTPATIENT
Start: 2018-02-02

## 2018-02-02 RX ORDER — SODIUM CHLORIDE 0.9 % (FLUSH) 0.9 %
10 SYRINGE (ML) INJECTION PRN
Status: CANCELLED | OUTPATIENT
Start: 2018-02-02

## 2018-02-02 RX ORDER — SODIUM CHLORIDE 0.9 % (FLUSH) 0.9 %
10 SYRINGE (ML) INJECTION EVERY 12 HOURS SCHEDULED
Status: DISCONTINUED | OUTPATIENT
Start: 2018-02-02 | End: 2018-02-15 | Stop reason: HOSPADM

## 2018-02-02 RX ORDER — SODIUM CHLORIDE 0.9 % (FLUSH) 0.9 %
10 SYRINGE (ML) INJECTION EVERY 12 HOURS SCHEDULED
Status: CANCELLED | OUTPATIENT
Start: 2018-02-02

## 2018-02-02 RX ORDER — DOCUSATE SODIUM 100 MG/1
100 CAPSULE, LIQUID FILLED ORAL 2 TIMES DAILY
Status: DISCONTINUED | OUTPATIENT
Start: 2018-02-02 | End: 2018-02-15 | Stop reason: HOSPADM

## 2018-02-02 RX ADMIN — DILTIAZEM HYDROCHLORIDE 60 MG: 30 TABLET, FILM COATED ORAL at 08:11

## 2018-02-02 RX ADMIN — CYCLOBENZAPRINE HYDROCHLORIDE 5 MG: 10 TABLET, FILM COATED ORAL at 13:51

## 2018-02-02 RX ADMIN — CYCLOBENZAPRINE HYDROCHLORIDE 5 MG: 10 TABLET, FILM COATED ORAL at 21:07

## 2018-02-02 RX ADMIN — DILTIAZEM HYDROCHLORIDE 60 MG: 30 TABLET, FILM COATED ORAL at 13:51

## 2018-02-02 RX ADMIN — MORPHINE SULFATE 1 MG: 2 INJECTION, SOLUTION INTRAMUSCULAR; INTRAVENOUS at 03:56

## 2018-02-02 RX ADMIN — Medication 10 ML: at 09:00

## 2018-02-02 RX ADMIN — DILTIAZEM HYDROCHLORIDE 60 MG: 30 TABLET, FILM COATED ORAL at 21:08

## 2018-02-02 RX ADMIN — TRAMADOL HYDROCHLORIDE 50 MG: 50 TABLET, COATED ORAL at 08:11

## 2018-02-02 RX ADMIN — DOCUSATE SODIUM 100 MG: 100 CAPSULE, LIQUID FILLED ORAL at 21:08

## 2018-02-02 RX ADMIN — Medication 10 ML: at 21:07

## 2018-02-02 RX ADMIN — RIVAROXABAN 15 MG: 15 TABLET, FILM COATED ORAL at 18:00

## 2018-02-02 RX ADMIN — PANTOPRAZOLE SODIUM 40 MG: 40 TABLET, DELAYED RELEASE ORAL at 06:16

## 2018-02-02 RX ADMIN — DOCUSATE SODIUM 100 MG: 100 CAPSULE, LIQUID FILLED ORAL at 08:11

## 2018-02-02 RX ADMIN — CYCLOBENZAPRINE HYDROCHLORIDE 5 MG: 10 TABLET, FILM COATED ORAL at 08:11

## 2018-02-02 RX ADMIN — TRAMADOL HYDROCHLORIDE 50 MG: 50 TABLET, FILM COATED ORAL at 12:00

## 2018-02-02 RX ADMIN — ATORVASTATIN CALCIUM 40 MG: 40 TABLET, FILM COATED ORAL at 21:08

## 2018-02-02 ASSESSMENT — PAIN DESCRIPTION - INTENSITY
RATING_2: 0
RATING_2: 5

## 2018-02-02 ASSESSMENT — PAIN SCALES - GENERAL
PAINLEVEL_OUTOF10: 7
PAINLEVEL_OUTOF10: 5
PAINLEVEL_OUTOF10: 6
PAINLEVEL_OUTOF10: 0
PAINLEVEL_OUTOF10: 0
PAINLEVEL_OUTOF10: 6
PAINLEVEL_OUTOF10: 7
PAINLEVEL_OUTOF10: 6

## 2018-02-02 ASSESSMENT — PAIN DESCRIPTION - ORIENTATION
ORIENTATION: RIGHT

## 2018-02-02 ASSESSMENT — PAIN DESCRIPTION - LOCATION
LOCATION: ARM;HIP
LOCATION_2: ELBOW
LOCATION: ARM
LOCATION: ARM;WRIST
LOCATION_2: ELBOW

## 2018-02-02 ASSESSMENT — PAIN DESCRIPTION - PAIN TYPE
TYPE: ACUTE PAIN
TYPE_2: ACUTE PAIN

## 2018-02-02 ASSESSMENT — PAIN DESCRIPTION - FREQUENCY: FREQUENCY: CONTINUOUS

## 2018-02-02 ASSESSMENT — PAIN DESCRIPTION - ONSET: ONSET: ON-GOING

## 2018-02-02 ASSESSMENT — PAIN DESCRIPTION - PROGRESSION: CLINICAL_PROGRESSION: NOT CHANGED

## 2018-02-02 ASSESSMENT — PAIN DESCRIPTION - DESCRIPTORS: DESCRIPTORS: ACHING;CONSTANT;SHOOTING;SHARP

## 2018-02-02 NOTE — PROGRESS NOTES
Chart reviewed. Patient's care discussed in daily quality rounds. Patient was admitted to [facility] after report of falling at home resulting in R. Radial fracture. Patient was evaluated by PT/OT and recommending SNF/[facility] for skilled therapies upon DC from [facility].   Patient and family are noted to have requested patient remain at [facility] for skilled therapies. Precert is reported to have been started and awaiting for patient's insurance to give approval before patient can be switched to subacute. SS to continue to follow.

## 2018-02-02 NOTE — PROGRESS NOTES
Hospitalist Progress Note      PCP: Dony Brantley CNP    Date of Admission: 1/29/2018    Chief Complaint: R arm/R hip pain     Subjective: pt in bed, looks comfortable     Medications:  Reviewed    Infusion Medications    Scheduled Medications    lidocaine  1 patch Transdermal Daily    rivaroxaban  15 mg Oral Daily    cyclobenzaprine  5 mg Oral TID    sodium chloride flush  10 mL Intravenous 2 times per day    atorvastatin  40 mg Oral Daily    diltiazem  60 mg Oral TID    docusate sodium  100 mg Oral BID    pantoprazole  40 mg Oral QAM AC     PRN Meds: morphine **OR** [DISCONTINUED] morphine, traMADol, sodium chloride flush, acetaminophen, oxyCODONE **OR** oxyCODONE, magnesium hydroxide, ondansetron      Intake/Output Summary (Last 24 hours) at 02/02/18 0904  Last data filed at 02/02/18 0400   Gross per 24 hour   Intake              200 ml   Output              500 ml   Net             -300 ml       Exam:    BP (!) 128/45   Pulse 79   Temp 98.1 °F (36.7 °C) (Oral)   Resp 16   Ht 4' 10\" (1.473 m)   Wt 119 lb 7.8 oz (54.2 kg)   SpO2 100%   BMI 24.97 kg/m²     General appearance: No apparent distress, appears stated age and cooperative. HEENT: Pupils equal, round, and reactive to light. Conjunctivae/corneas clear. Neck: Supple, with full range of motion. No jugular venous distention. Trachea midline. Respiratory:  Normal respiratory effort. Clear to auscultation, bilaterally without Rales/Wheezes/Rhonchi. Cardiovascular: Regular rate and rhythm with normal S1/S2 without murmurs, rubs or gallops. Abdomen: Soft, non-tender, non-distended with normal bowel sounds. Musculoskeletal: R arm in splint. R hip hematoma- present on admission. Full range of motion without deformity. Skin: Skin color, texture, turgor normal.    Neurologic:  Neurovascularly intact without any focal sensory/motor deficits.  Cranial nerves: II-XII intact, grossly non-focal.  Psychiatric: Alert and oriented, thought content appropriate, normal insight  Capillary Refill: Brisk,< 3 seconds   Peripheral Pulses: +2 palpable, equal bilaterally       Labs:   Recent Labs      02/02/18   0521   WBC  11.2*   HGB  9.1*   HCT  26.1*   PLT  164     Recent Labs      02/02/18   0521   NA  130*   K  4.6   CL  94*   CO2  24   BUN  21   CREATININE  0.52   CALCIUM  8.6     No results for input(s): AST, ALT, BILIDIR, BILITOT, ALKPHOS in the last 72 hours. No results for input(s): INR in the last 72 hours. No results for input(s): Madonna Lofty in the last 72 hours. Urinalysis:    Lab Results   Component Value Date    NITRU Negative 11/19/2013    WBCUA 0-2 11/19/2013    BACTERIA Rare 11/19/2013    RBCUA 0-2 11/19/2013    BLOODU SMALL 11/19/2013    SPECGRAV 1.015 11/19/2013    GLUCOSEU Negative 11/19/2013       Radiology:  XR WRIST RIGHT (2 VIEWS)   Final Result   ACUTE COMMINUTED FRACTURE WITH INTRA-ARTICULAR EXTENSION OF THE RIGHT DISTAL RADIUS      XR HIP 2-3 VW W PELVIS RIGHT   Final Result   NO ACUTE FRACTURE. CT HEAD WO CONTRAST   ED Interpretation   Negative for acute intracranial hemorrhage. There is soft tissue swelling over the right frontal scalp      Final Result      NO ACUTE INTRA-AXIAL OR EXTRA-AXIAL FINDINGS. All CT scans at this facility use dose modulation, iterative reconstruction, and/or weight based dosing when appropriate to reduce radiation dose to as low as reasonably achievable.                     Assessment/Plan:    Active Hospital Problems    Diagnosis Date Noted    Closed right radial fracture, closed, initial encounter Joseph Clancy 01/31/2018    Radial head fracture, closed, right, closed, initial encounter [S52.121A] 01/31/2018    Atrial fibrillation ([facility] [address] [I48.91] 06/30/2014    HTN (hypertension) [I10] 06/29/2013         DVT Prophylaxis: xarelto  Diet: DIET GENERAL; Dental Soft  Dietary Nutrition Supplements: Standard High Calorie Oral Supplement  Code Status: Full Code    PT/OT Eval Status: done    Dispo - Mechanical fall with R radial fracture- continue with pain meds, appreciate  ortho input  History A fib- anticoagulation restarted, rate controlled  Episode of hypotension yesterday- resolved, cozaar were DC, follow up clinically  Anemia due to massive R hip hematoma- stable, follow up with H/H count   History of leg cellulitis- treated, has chronic skin changes, no signs of acute infection  Weakness, fall, may need rehab stay after DC- social service on case   Stable for admission at [facility]        Electronically signed by Brooke Gonzalez MD on 2/2/2018 at 9:04 AM

## 2018-02-02 NOTE — PROGRESS NOTES
Pain: Yes       Orientation     Objective      Transfers  Sit to Stand: Maximum Assistance; Moderate Assistance  Stand to sit: Moderate Assistance;Maximum Assistance (modAx1 + minAx1 )  Stand Pivot Transfers: Moderate Assistance (modAx1 + minAx1 from bed> WC, maxAx1 + CGAx1 from [employer])  Comment: pt transfered from bed to [facility] w/ pt assisting by pulling to stand on WC and reaching for arm rest to pull herself toward her WC. Ambulation  Ambulation?: No  Wheelchair Activities  Propulsion: Yes  Propulsion 1  Propulsion: Manual  Level: Level Tile  Method: RLE;LLE  Level of Assistance: Minimal assistance  Distance: x 150' w/ 3 brief RB's        Exercises  Hip Flexion: 2 x 10   Knee Long Arc Quad: 2 x 10   Ankle Pumps: x 20             Cryotherapy (Minutes\Location): x15 min post to R arm and hip  to decrease pain            Assessment   Body structures, Functions, Activity limitations: Decreased functional mobility ; Decreased strength;Decreased endurance  Assessment: Pt tolerated self propelling with B LE's down hallway for functional strengthening. Pt performed a few LE ther ex and had increase with transfers but continues to require mod/maxAx1 + CGA/minAx1 for safety. Pt was able to assist with transfers with use of LUE to pull herself towards chair with good follow through. Pt left to sit up in chair with call light and chair alarm in place. Placed pillow under RUE to decrease swelling in hand and donned ice to R hip and R arm for pain relief. Continue to progress as tolerated. Pain level 6/10 after session. Co-treat with OT for inc safety with transfers and due to low endurance.    Treatment Diagnosis: decreased fucntioal mobility post fall with R radius fx, R hip contusion, and R temple bruise  REQUIRES PT FOLLOW UP: Yes  Activity Tolerance  Activity Tolerance: Patient limited by pain  PT Equipment Recommendations  Other: may need Red Ambientalwalker one handle use for LUE support due to previous use of ww for

## 2018-02-02 NOTE — PROGRESS NOTES
(Estimated Nutrition Needs):  · Estimated Daily Total Kcal: 7086-4818  · Estimated Daily Protein (g): 54-63    Estimated Intake vs Estimated Needs: Intake Improving    Nutrition Risk Level: Moderate    Nutrition Interventions:   Continue current diet, Continue current ONS  Continued Inpatient Monitoring, Education not appropriate at this time    Nutrition Evaluation:   · Evaluation: Goals set   · Goals: po intake > 75% of meals & supplements. · Monitoring: Meal Intake, Supplement Intake, Fluid Balance, Weight, Pertinent Labs, Constipation    See Adult Nutrition Doc Flowsheet for more detail.      Electronically signed by Tee Fajardo RD, LD on 2/2/18 at 3:35 PM

## 2018-02-03 LAB
ABO/RH: NORMAL
ANTIBODY SCREEN: NORMAL
BACTERIA: ABNORMAL /HPF
BASOPHILS ABSOLUTE: 0 K/UL (ref 0–0.2)
BASOPHILS RELATIVE PERCENT: 0.4 %
BILIRUBIN URINE: NEGATIVE
BLOOD BANK DISPENSE STATUS: NORMAL
BLOOD BANK DISPENSE STATUS: NORMAL
BLOOD BANK PRODUCT CODE: NORMAL
BLOOD BANK PRODUCT CODE: NORMAL
BLOOD, URINE: ABNORMAL
BPU ID: NORMAL
BPU ID: NORMAL
CLARITY: CLEAR
COLOR: YELLOW
DESCRIPTION BLOOD BANK: NORMAL
DESCRIPTION BLOOD BANK: NORMAL
EOSINOPHILS ABSOLUTE: 0.4 K/UL (ref 0–0.7)
EOSINOPHILS RELATIVE PERCENT: 4.5 %
EPITHELIAL CELLS, UA: ABNORMAL /HPF
FERRITIN: 115.7 NG/ML (ref 13–150)
FOLATE: >20 NG/ML (ref 7.3–26.1)
GLUCOSE URINE: NEGATIVE MG/DL
HCT VFR BLD CALC: 22.8 % (ref 37–47)
HEMOGLOBIN: 7.7 G/DL (ref 12–16)
IRON SATURATION: 10 % (ref 11–46)
IRON: 20 UG/DL (ref 37–145)
KETONES, URINE: NEGATIVE MG/DL
LEUKOCYTE ESTERASE, URINE: ABNORMAL
LYMPHOCYTES ABSOLUTE: 1.3 K/UL (ref 1–4.8)
LYMPHOCYTES RELATIVE PERCENT: 15.2 %
MCH RBC QN AUTO: 32.6 PG (ref 27–31.3)
MCHC RBC AUTO-ENTMCNC: 33.8 % (ref 33–37)
MCV RBC AUTO: 96.4 FL (ref 82–100)
MONOCYTES ABSOLUTE: 0.9 K/UL (ref 0.2–0.8)
MONOCYTES RELATIVE PERCENT: 9.8 %
NEUTROPHILS ABSOLUTE: 6.2 K/UL (ref 1.4–6.5)
NEUTROPHILS RELATIVE PERCENT: 70.1 %
NITRITE, URINE: NEGATIVE
PDW BLD-RTO: 13.9 % (ref 11.5–14.5)
PH UA: 6 (ref 5–9)
PLATELET # BLD: 193 K/UL (ref 130–400)
PROTEIN UA: 30 MG/DL
RBC # BLD: 2.36 M/UL (ref 4.2–5.4)
RBC UA: ABNORMAL /HPF (ref 0–2)
SPECIFIC GRAVITY UA: 1.02 (ref 1–1.03)
TOTAL IRON BINDING CAPACITY: 207 UG/DL (ref 178–450)
URINE TYPE: ABNORMAL
UROBILINOGEN, URINE: 0.2 E.U./DL
VITAMIN B-12: 778 PG/ML (ref 232–1245)
WBC # BLD: 8.8 K/UL (ref 4.8–10.8)
WBC UA: ABNORMAL /HPF (ref 0–5)

## 2018-02-03 PROCEDURE — 6360000002 HC RX W HCPCS: Performed by: INTERNAL MEDICINE

## 2018-02-03 PROCEDURE — 1200000000 HC SEMI PRIVATE

## 2018-02-03 PROCEDURE — 82728 ASSAY OF FERRITIN: CPT

## 2018-02-03 PROCEDURE — 86920 COMPATIBILITY TEST SPIN: CPT

## 2018-02-03 PROCEDURE — 87086 URINE CULTURE/COLONY COUNT: CPT

## 2018-02-03 PROCEDURE — 36430 TRANSFUSION BLD/BLD COMPNT: CPT

## 2018-02-03 PROCEDURE — 2580000003 HC RX 258

## 2018-02-03 PROCEDURE — 36415 COLL VENOUS BLD VENIPUNCTURE: CPT

## 2018-02-03 PROCEDURE — 6370000000 HC RX 637 (ALT 250 FOR IP): Performed by: INTERNAL MEDICINE

## 2018-02-03 PROCEDURE — 83540 ASSAY OF IRON: CPT

## 2018-02-03 PROCEDURE — 82746 ASSAY OF FOLIC ACID SERUM: CPT

## 2018-02-03 PROCEDURE — 81001 URINALYSIS AUTO W/SCOPE: CPT

## 2018-02-03 PROCEDURE — 84466 ASSAY OF TRANSFERRIN: CPT

## 2018-02-03 PROCEDURE — 86850 RBC ANTIBODY SCREEN: CPT

## 2018-02-03 PROCEDURE — 85025 COMPLETE CBC W/AUTO DIFF WBC: CPT

## 2018-02-03 PROCEDURE — 2580000003 HC RX 258: Performed by: INTERNAL MEDICINE

## 2018-02-03 PROCEDURE — 86900 BLOOD TYPING SEROLOGIC ABO: CPT

## 2018-02-03 PROCEDURE — P9016 RBC LEUKOCYTES REDUCED: HCPCS

## 2018-02-03 PROCEDURE — 82607 VITAMIN B-12: CPT

## 2018-02-03 PROCEDURE — 86901 BLOOD TYPING SEROLOGIC RH(D): CPT

## 2018-02-03 RX ORDER — FUROSEMIDE 10 MG/ML
20 INJECTION INTRAMUSCULAR; INTRAVENOUS ONCE
Status: COMPLETED | OUTPATIENT
Start: 2018-02-03 | End: 2018-02-03

## 2018-02-03 RX ORDER — 0.9 % SODIUM CHLORIDE 0.9 %
250 INTRAVENOUS SOLUTION INTRAVENOUS ONCE
Status: COMPLETED | OUTPATIENT
Start: 2018-02-03 | End: 2018-02-04

## 2018-02-03 RX ORDER — SODIUM CHLORIDE 9 MG/ML
INJECTION, SOLUTION INTRAVENOUS
Status: COMPLETED
Start: 2018-02-03 | End: 2018-02-04

## 2018-02-03 RX ADMIN — ACETAMINOPHEN 650 MG: 325 TABLET ORAL at 08:33

## 2018-02-03 RX ADMIN — Medication 10 ML: at 14:08

## 2018-02-03 RX ADMIN — DILTIAZEM HYDROCHLORIDE 60 MG: 30 TABLET, FILM COATED ORAL at 20:30

## 2018-02-03 RX ADMIN — ACETAMINOPHEN 650 MG: 325 TABLET ORAL at 14:07

## 2018-02-03 RX ADMIN — Medication 250 ML: at 14:07

## 2018-02-03 RX ADMIN — Medication 10 ML: at 20:31

## 2018-02-03 RX ADMIN — DOCUSATE SODIUM 100 MG: 100 CAPSULE, LIQUID FILLED ORAL at 08:33

## 2018-02-03 RX ADMIN — SODIUM CHLORIDE 250 ML: 9 INJECTION, SOLUTION INTRAVENOUS at 14:07

## 2018-02-03 RX ADMIN — DOCUSATE SODIUM 100 MG: 100 CAPSULE, LIQUID FILLED ORAL at 20:30

## 2018-02-03 RX ADMIN — FUROSEMIDE 20 MG: 10 INJECTION, SOLUTION INTRAMUSCULAR; INTRAVENOUS at 14:06

## 2018-02-03 RX ADMIN — PANTOPRAZOLE SODIUM 40 MG: 40 TABLET, DELAYED RELEASE ORAL at 06:19

## 2018-02-03 RX ADMIN — DILTIAZEM HYDROCHLORIDE 60 MG: 30 TABLET, FILM COATED ORAL at 08:32

## 2018-02-03 RX ADMIN — ACETAMINOPHEN 650 MG: 325 TABLET ORAL at 23:41

## 2018-02-03 RX ADMIN — CYCLOBENZAPRINE HYDROCHLORIDE 5 MG: 10 TABLET, FILM COATED ORAL at 20:30

## 2018-02-03 RX ADMIN — ATORVASTATIN CALCIUM 40 MG: 40 TABLET, FILM COATED ORAL at 20:30

## 2018-02-03 ASSESSMENT — PAIN SCALES - GENERAL
PAINLEVEL_OUTOF10: 6
PAINLEVEL_OUTOF10: 0
PAINLEVEL_OUTOF10: 6

## 2018-02-03 ASSESSMENT — PAIN DESCRIPTION - PAIN TYPE: TYPE: ACUTE PAIN

## 2018-02-03 ASSESSMENT — PAIN DESCRIPTION - LOCATION: LOCATION: HIP

## 2018-02-03 ASSESSMENT — PAIN DESCRIPTION - ORIENTATION: ORIENTATION: RIGHT

## 2018-02-03 NOTE — H&P
Hospital Medicine History & Physical      PCP: Jose R Murphy CNP    Date of Admission: 2/2/2018    Date of Service: 2/3/18      Chief Complaint:  weakness      History Of Present Illness:  80 y.o. female who presented to [facility] with acute fall, was found to have R radial fracture, splint was applied. Also she was found to have R hip hematoma, developed anemia. After completing acute stay was transferred to skilled status     Past Medical History:          Diagnosis Date    Anxiety     Atrial fibrillation ([address]     Bilateral carotid artery stenosis 10/27/2017    Central retinal artery occlusion, right eye 10/17/2017    Chronic constipation     Chronic venous insufficiency     Dyslipidemia     Essential hypertension     HTN (hypertension) 6/29/2013    Neurodermatitis     chronic, of bilateral lower extremities    Osteoporosis     Pap test, as part of routine gynecological examination     years ago   [facility] Screening mammogram     about 5 yrs ago    Seronegative rheumatoid arthritis ([facility] [address]     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 Painless. - Had CT head without contrast at OSH ED that shows: No acute intracranial abnormality. 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 - EKG shows sinus bradycardia with PAC(R-58) - Concern for retinal artery occlusion?> 12 hours have passed. - Afib related? Carotid bruit? - BP was 210/85 initially and then came down to 160/69 -Normal CRP - No other acute neuro deficit seen. -Spoke to [employer] on [facility]NGC([id number])- alon Villalta and he will arrange for pt to be seen today at [facility]. No acute intervention needed as pt well out of time frame(as per optho).   Plan: - Admit to [facility] TABS Take 1 tablet by mouth 2 times daily 6/24/16   Mando Barakat CNP   diltiazem (CARDIZEM CD) 180 MG ER capsule Take 1 capsule by mouth daily. 2/6/15 3/30/15  Mando Barakat CNP   Multiple Vitamin (MULTIVITAMIN PO) Take  by mouth daily. Historical Provider, MD       Allergies:  Cephalexin; Cipro xr; Doxycycline; Meprobamate; Penicillins; Relafen [nabumetone]; and Nsaids    Social History:      The patient currently lives at home    TOBACCO:   reports that she has never smoked. She has never used smokeless tobacco.  ETOH:   reports that she drinks alcohol. Family History:       Reviewed in detail and negative for DM, CAD, Cancer, CVA. Positive as follows:        Problem Relation Age of Onset    Cancer Daughter      lung, smoker        REVIEW OF SYSTEMS:   Pertinent positives as noted in the HPI. All other systems reviewed and negative. PHYSICAL EXAM:    BP (!) 133/48   Pulse 88   Temp 98.7 °F (37.1 °C) (Oral)   Resp 16   Ht 4' 10\" (1.473 m)   Wt 114 lb 13.8 oz (52.1 kg)   SpO2 100%   BMI 24.01 kg/m²     General appearance:  No apparent distress, appears stated age and cooperative. HEENT:  Normal cephalic, atraumatic without obvious deformity. Pupils equal, round, and reactive to light. Extra ocular muscles intact. Conjunctivae/corneas clear. Neck: Supple, with full range of motion. No jugular venous distention. Trachea midline. Respiratory:  Normal respiratory effort. Clear to auscultation, bilaterally without Rales/Wheezes/Rhonchi. Cardiovascular:  Regular rate and rhythm with normal S1/S2 without murmurs, rubs or gallops. Abdomen: Soft, non-tender, non-distended with normal bowel sounds. Musculoskeletal:  R arm splint. Skin: R hip hematoma. Neurologic:  Neurovascularly intact without any focal sensory/motor deficits.  Cranial nerves: II-XII intact, grossly non-focal.  Psychiatric:  Alert and oriented, thought content appropriate, normal insight  Capillary Refill: Brisk,< 3 seconds   Peripheral Pulses: +2 palpable, equal bilaterally       Labs:     Recent Labs      02/02/18   0521  02/03/18   0523   WBC  11.2*  8.8   HGB  9.1*  7.7*   HCT  26.1*  22.8*   PLT  164  193     Recent Labs      02/02/18   0521   NA  130*   K  4.6   CL  94*   CO2  24   BUN  21   CREATININE  0.52   CALCIUM  8.6     No results for input(s): AST, ALT, BILIDIR, BILITOT, ALKPHOS in the last 72 hours. No results for input(s): INR in the last 72 hours. No results for input(s): Carlos Embs in the last 72 hours. Urinalysis:      Lab Results   Component Value Date    NITRU Negative 11/19/2013    WBCUA 0-2 11/19/2013    BACTERIA Rare 11/19/2013    RBCUA 0-2 11/19/2013    BLOODU SMALL 11/19/2013    SPECGRAV 1.015 11/19/2013    GLUCOSEU Negative 11/19/2013           No orders to display       ASSESSMENT:    C/Shayna [address] Problems    Diagnosis Date Noted    Closed right radial fracture, closed, initial encounter Carissa Decent 01/31/2018       PLAN:        DVT Prophylaxis: on hold  Diet: DIET GENERAL; Dental Soft  Dietary Nutrition Supplements: Standard High Calorie Oral Supplement  Code Status: Full Code    PT/OT Eval Status: done    Dispo - Mechanical fall, R radial fracture- splint applied  Pain due to above- continue with current meds, will try to avoid opioids due to sundown s-m   HTN- controlled  Acute anemia due to big R hip hematoma- hold xarelto, RBC transfusion today  A fib- chronic, rate controlled, hold xarelto, will restart when pt became stable   Weakness, difficulty to ambulate- rehab orders  Acute urinary retention with bladder scan shows 700ml urine- cruz cath were inserted  Stable for admission at [address] MD    Thank you Duc Arguelles CNP for the opportunity to be involved in this patient's care. If you have any questions or concerns please feel free to contact me.

## 2018-02-04 PROCEDURE — 97162 PT EVAL MOD COMPLEX 30 MIN: CPT

## 2018-02-04 PROCEDURE — 97110 THERAPEUTIC EXERCISES: CPT

## 2018-02-04 PROCEDURE — 97167 OT EVAL HIGH COMPLEX 60 MIN: CPT

## 2018-02-04 PROCEDURE — 6370000000 HC RX 637 (ALT 250 FOR IP): Performed by: INTERNAL MEDICINE

## 2018-02-04 PROCEDURE — 1200000000 HC SEMI PRIVATE

## 2018-02-04 PROCEDURE — 97535 SELF CARE MNGMENT TRAINING: CPT

## 2018-02-04 PROCEDURE — G8988 SELF CARE GOAL STATUS: HCPCS

## 2018-02-04 PROCEDURE — 97116 GAIT TRAINING THERAPY: CPT

## 2018-02-04 PROCEDURE — G8987 SELF CARE CURRENT STATUS: HCPCS

## 2018-02-04 PROCEDURE — 2580000003 HC RX 258: Performed by: INTERNAL MEDICINE

## 2018-02-04 PROCEDURE — 97530 THERAPEUTIC ACTIVITIES: CPT

## 2018-02-04 RX ADMIN — ACETAMINOPHEN 650 MG: 325 TABLET ORAL at 06:34

## 2018-02-04 RX ADMIN — ATORVASTATIN CALCIUM 40 MG: 40 TABLET, FILM COATED ORAL at 20:06

## 2018-02-04 RX ADMIN — TRAMADOL HYDROCHLORIDE 50 MG: 50 TABLET, COATED ORAL at 18:06

## 2018-02-04 RX ADMIN — DOCUSATE SODIUM 100 MG: 100 CAPSULE, LIQUID FILLED ORAL at 08:13

## 2018-02-04 RX ADMIN — TRAMADOL HYDROCHLORIDE 50 MG: 50 TABLET, COATED ORAL at 12:22

## 2018-02-04 RX ADMIN — CYCLOBENZAPRINE HYDROCHLORIDE 5 MG: 10 TABLET, FILM COATED ORAL at 20:06

## 2018-02-04 RX ADMIN — DILTIAZEM HYDROCHLORIDE 60 MG: 30 TABLET, FILM COATED ORAL at 14:05

## 2018-02-04 RX ADMIN — CYCLOBENZAPRINE HYDROCHLORIDE 5 MG: 10 TABLET, FILM COATED ORAL at 08:14

## 2018-02-04 RX ADMIN — Medication 10 ML: at 08:14

## 2018-02-04 RX ADMIN — DOCUSATE SODIUM 100 MG: 100 CAPSULE, LIQUID FILLED ORAL at 20:06

## 2018-02-04 RX ADMIN — ACETAMINOPHEN 650 MG: 325 TABLET ORAL at 23:57

## 2018-02-04 RX ADMIN — DILTIAZEM HYDROCHLORIDE 60 MG: 30 TABLET, FILM COATED ORAL at 08:14

## 2018-02-04 RX ADMIN — DILTIAZEM HYDROCHLORIDE 60 MG: 30 TABLET, FILM COATED ORAL at 20:06

## 2018-02-04 RX ADMIN — Medication 10 ML: at 20:06

## 2018-02-04 RX ADMIN — CYCLOBENZAPRINE HYDROCHLORIDE 5 MG: 10 TABLET, FILM COATED ORAL at 14:05

## 2018-02-04 RX ADMIN — PANTOPRAZOLE SODIUM 40 MG: 40 TABLET, DELAYED RELEASE ORAL at 06:34

## 2018-02-04 ASSESSMENT — PAIN SCALES - GENERAL
PAINLEVEL_OUTOF10: 6
PAINLEVEL_OUTOF10: 8
PAINLEVEL_OUTOF10: 6
PAINLEVEL_OUTOF10: 6
PAINLEVEL_OUTOF10: 4
PAINLEVEL_OUTOF10: 6

## 2018-02-04 ASSESSMENT — PAIN DESCRIPTION - FREQUENCY
FREQUENCY: CONTINUOUS
FREQUENCY: CONTINUOUS

## 2018-02-04 ASSESSMENT — PAIN DESCRIPTION - ORIENTATION
ORIENTATION: RIGHT
ORIENTATION: RIGHT

## 2018-02-04 ASSESSMENT — PAIN DESCRIPTION - PAIN TYPE
TYPE: ACUTE PAIN
TYPE: ACUTE PAIN;CHRONIC PAIN
TYPE: ACUTE PAIN

## 2018-02-04 ASSESSMENT — PAIN DESCRIPTION - LOCATION
LOCATION: HIP;SHOULDER
LOCATION: ARM;HIP
LOCATION: HIP;ARM
LOCATION: ABDOMEN

## 2018-02-04 ASSESSMENT — PAIN DESCRIPTION - PROGRESSION
CLINICAL_PROGRESSION: NOT CHANGED
CLINICAL_PROGRESSION: NOT CHANGED

## 2018-02-04 ASSESSMENT — PAIN DESCRIPTION - DESCRIPTORS: DESCRIPTORS: ACHING

## 2018-02-04 ASSESSMENT — PAIN SCALES - WONG BAKER: WONGBAKER_NUMERICALRESPONSE: 10

## 2018-02-04 NOTE — PROGRESS NOTES
Occupational Therapy   Occupational Therapy Initial Assessment  Date: 2018   Patient Name: Yvonne Ross  MRN: [id number]     : 10/9/1926    Patient Diagnosis(es): There were no encounter diagnoses. has a past medical history of Anxiety; Atrial fibrillation ([facility] [address] Bilateral carotid artery stenosis; Central retinal artery occlusion, right eye; Chronic constipation; Chronic venous insufficiency; Dyslipidemia; Essential hypertension; HTN (hypertension); Neurodermatitis; Osteoporosis; Pap test, as part of routine gynecological examination; Screening mammogram; Seronegative rheumatoid arthritis ([facility] [address] Status post total replacement of right hip; Vision loss of right eye; and Vitamin D deficiency. has a past surgical history that includes Total hip arthroplasty (Right, ); Rotator cuff repair (Right); and Tonsillectomy. Restrictions  Restrictions/Precautions  Restrictions/Precautions: Fall Risk (DECREASED VISION/ RIGHT EYE BLIND.)  Required Braces or Orthoses?: Yes  Implants present? : Metal implants  Upper Extremity Weight Bearing Restrictions  Right Upper Extremity Weight Bearing: Weight Bearing As Tolerated (PT HAS RIGHT ARM SLING.)  Required Braces or Orthoses  Right Lower Extremity Brace: Ankle Foot Orthotics  Left Lower Extremity Brace: Loli Foot Orthotics  Right Upper Extremity Brace/Splint:  (cast, sling when OOB)    Subjective   General  Chart Reviewed: Yes  Patient assessed for rehabilitation services?: Yes  Response to previous treatment: Patient reporting fatigue but able to participate  Family / Caregiver Present: Yes  Diagnosis: FALL/ CHI/ COLLES FX RIGHT WRIST/ RIGHT HIP CONTUSION.   Pain Assessment  Patient Currently in Pain: Yes  Pain Assessment: 0-10  Medley-Baker Pain Rating: Hurts worst  Pain Level: 6  Pain Type: Acute pain  Pain Location: Arm, Hip  Pain Orientation: Right  Pain Descriptors: Aching  Pain Frequency: Continuous  Clinical Progression: Not changed  Patient's Stated Bathing: Maximum assistance  UE Dressing: Maximum assistance  LE Dressing: Maximum assistance  Toileting: Maximum assistance  Tone RUE  RUE Tone: Normotonic  Tone LUE  LUE Tone: Normotonic           Vision - Basic Assessment  Prior Vision: Blind  Visual Field Cut: Right  Vision Comments: PT BLIND IN RIGHT EYE. Sensation  Overall Sensation Status: WFL        LUE PROM (degrees)  LUE General PROM: PT HAS SEVERLY LIMITED LEFT SHOULDER MOVEMENTS DUE TO OA BUT REMAINDER WFLS. LUE AROM (degrees)  LUE AROM : Exceptions  Left Hand PROM (degrees)  Left Hand PROM: WFL  Left Hand AROM (degrees)  Left Hand AROM: WFL  RUE AROM (degrees)  RUE AROM : Exceptions  RUE General AROM: NT DUE TO RIGHT UE FX. Right Hand PROM (degrees)  Right Hand PROM: WFL  Right Hand AROM (degrees)  Right Hand AROM: WFL  LUE Strength  LUE Strength Comment: 2/5 SHOULDER; REMAINDER 3/5  RUE Strength  RUE Strength Comment: NT                   Assessment   Performance deficits / Impairments: Decreased functional mobility ; Decreased safe awareness;Decreased balance;Decreased coordination;Decreased ADL status; Decreased vision/visual deficit; Decreased high-level IADLs;Decreased endurance;Decreased ROM; Decreased strength;Decreased fine motor control  Prognosis: Good  Decision Making: High Complexity  Discharge Recommendations: Continue to assess pending progress  REQUIRES OT FOLLOW UP: Yes  Activity Tolerance  Activity Tolerance: Patient limited by fatigue;Patient limited by pain  Activity Tolerance: POOR         Discharge Recommendations:  Continue to assess pending progress     Plan   Plan  Times per week: 3-5  Times per day: Daily  Plan weeks: 2  Current Treatment Recommendations: Strengthening, Endurance Training, Neuromuscular Re-education, Patient/Caregiver Education & Training, ROM, Equipment Evaluation, Education, & procurement, Cognitive Reorientation, Self-Care / ADL, Home Management Training, Pain Management, Balance Training, Functional

## 2018-02-04 NOTE — PROGRESS NOTES
Pt right arm cast intact with good circulation/ cap refill right hand fingers. Patient repositioned for comfort and to alleviate pressure, skin protectors applied in appropriate areas, and bony prominences elevated. No new breakdown noted. Pt denies discomfort at this time, will continue to monitor.  Electronically signed by Pebbles Sandhu RN on 2/3/2018 at 7:34 PM

## 2018-02-04 NOTE — PROGRESS NOTES
Physical Therapy    Facility/Department: [facility] MED SURG UNIT  Initial Assessment    NAME: Alfredo Mata  : 10/9/1926  MRN: [id number]    Date of Service: 2018    Patient Diagnosis(es): There were no encounter diagnoses. has a past medical history of Anxiety; Atrial fibrillation ([facility] [address] Bilateral carotid artery stenosis; Central retinal artery occlusion, right eye; Chronic constipation; Chronic venous insufficiency; Dyslipidemia; Essential hypertension; HTN (hypertension); Neurodermatitis; Osteoporosis; Pap test, as part of routine gynecological examination; Screening mammogram; Seronegative rheumatoid arthritis ([facility] [address] Status post total replacement of right hip; Vision loss of right eye; and Vitamin D deficiency. has a past surgical history that includes Total hip arthroplasty (Right, ); Rotator cuff repair (Right); and Tonsillectomy. Restrictions  Restrictions/Precautions  Restrictions/Precautions: Fall Risk  Required Braces or Orthoses?: Yes (left articulating  AFO)  Implants present? : Metal implants  Upper Extremity Weight Bearing Restrictions  Right Upper Extremity Weight Bearing: Non Weight Bearing (may use platform walker if tolerated. )  Required Braces or Orthoses  Left Lower Extremity Brace: Loli Foot Orthotics  Right Upper Extremity Brace/Splint:  (cast, sling when OOB)  Vision/Hearing  Vision: Impaired  Vision Exceptions: Wears glasses for reading (Can't see out of R eye-long standing)  Hearing: Within functional limits     Subjective  General  Chart Reviewed: Yes  Patient assessed for rehabilitation services?: Yes  Family / Caregiver Present: No  Referring Practitioner: Dr Cathy Grossman  Referral Date : 18  Diagnosis:  r radial fx, CHF, R hip contusion  Follows Commands: Within Functional Limits  Subjective  Subjective: Pt lying in bed and agreeable to therapy. Pt states she slept \"okay\" but would wake up secondary to pain and nursing.   Pain Screening  Patient Currently in Pain: (degrees)  LUE General PROM:  (See OT)  AROM LUE (degrees)  LUE General AROM: see OT eval  Strength RLE  Comment: hip 4/5 , increased pain with abduction noted; knee 4+/5, ankle 4+/5  Strength LLE  Comment: hip 4/5, knee 4+/5, ankle 4/5  Strength RUE  Comment: see OT eval  Strength LUE  Comment: see OTeval     Sensation  Overall Sensation Status: WFL  Bed mobility  Bridging: Maximum assistance  Scooting: Minimal assistance (while in w/c with cues)  Transfers  Sit to Stand: Maximum Assistance (x2, retrograde balance)  Stand to sit: Moderate Assistance (modAx1 + minAx1 )  Bed to Chair: Maximum assistance (x2 with retrograde balance/ resistance )  Stand Pivot Transfers: Maximum Assistance (x2, very fearful)  Ambulation  Ambulation?: No  WB Status:  (Pt too fearful and hip to painful to attempt ambuation today)  Stairs/Curb  Stairs?: No  Wheelchair Activities  Propulsion: Yes (needs)  Propulsion 1  Propulsion: Manual  Level: Level Tile  Method: RLE;LLE  Level of Assistance:  (not evaluated today, pt needed to stay close to BR)     Balance  Posture: Poor  Sitting - Static: Fair;-  Sitting - Dynamic: Fair  Standing - Static: Poor  Standing - Dynamic: Poor  Comments: Fearfullness decrases safety and balance  Exercises  Comments: Pt. limited by pain with all mobility educated importance of participation to decrease risk of further functional decline     Assessment   Body structures, Functions, Activity limitations: Decreased functional mobility ; Decreased strength;Decreased endurance  Assessment: Expect progress to be slow secondary to pts fear of falling  Treatment Diagnosis: Decreased fucntional mobility  Prognosis: Poor  Decision Making: Medium Complexity  Patient Education: Educated about goals for rehab  REQUIRES PT FOLLOW UP: Yes  Activity Tolerance  Activity Tolerance: Patient limited by pain; Other  Activity Tolerance: Fear of falling preventing pt from progressing  PT Equipment Recommendations  Other: may need wide

## 2018-02-04 NOTE — PROGRESS NOTES
Hospitalist Progress Note      PCP: Gloria Dean CNP    Date of Admission: 2/2/2018    Chief Complaint: weakness    Subjective: pt awake/alert, eating breakfast      Medications:  Reviewed    Infusion Medications    Scheduled Medications    sodium chloride flush  10 mL Intravenous 2 times per day    atorvastatin  40 mg Oral Daily    cyclobenzaprine  5 mg Oral TID    diltiazem  60 mg Oral TID    docusate sodium  100 mg Oral BID    lidocaine  1 patch Transdermal Daily    pantoprazole  40 mg Oral QAM AC    traMADol  50 mg Oral 4 times per day     PRN Meds: acetaminophen, magnesium hydroxide, ondansetron, oxyCODONE **OR** oxyCODONE, sodium chloride flush      Intake/Output Summary (Last 24 hours) at 02/04/18 0834  Last data filed at 02/04/18 0228   Gross per 24 hour   Intake             1801 ml   Output             2125 ml   Net             -324 ml       Exam:    BP (!) 174/62   Pulse 88   Temp 98 °F (36.7 °C) (Oral)   Resp 20   Ht 4' 10\" (1.473 m)   Wt 114 lb 13.8 oz (52.1 kg)   SpO2 98%   BMI 24.01 kg/m²     General appearance: No apparent distress, appears stated age and cooperative. HEENT: Pupils equal, round, and reactive to light. Conjunctivae/corneas clear. Neck: Supple, with full range of motion. No jugular venous distention. Trachea midline. Respiratory:  Normal respiratory effort. Clear to auscultation, bilaterally without Rales/Wheezes/Rhonchi. Cardiovascular: Regular rate and rhythm with normal S1/S2 without murmurs, rubs or gallops. Abdomen: Soft, non-tender, non-distended with normal bowel sounds. Musculoskeletal: R arm splint   Skin:R hip hematoma. Skin color, texture, turgor normal.  No rashes or lesions. Neurologic:  Neurovascularly intact without any focal sensory/motor deficits.  Cranial nerves: II-XII intact, grossly non-focal.  Psychiatric: Alert and oriented, thought content appropriate, normal insight  Capillary Refill: Brisk,< 3 seconds   Peripheral Pulses: +2

## 2018-02-05 LAB
ANION GAP SERPL CALCULATED.3IONS-SCNC: 15 MEQ/L (ref 7–13)
BASOPHILS ABSOLUTE: 0 K/UL (ref 0–0.2)
BASOPHILS RELATIVE PERCENT: 0.6 %
BUN BLDV-MCNC: 23 MG/DL (ref 8–23)
CALCIUM SERPL-MCNC: 8.8 MG/DL (ref 8.6–10.2)
CHLORIDE BLD-SCNC: 95 MEQ/L (ref 98–107)
CO2: 24 MEQ/L (ref 22–29)
CREAT SERPL-MCNC: 0.42 MG/DL (ref 0.5–0.9)
EOSINOPHILS ABSOLUTE: 0.2 K/UL (ref 0–0.7)
EOSINOPHILS RELATIVE PERCENT: 3.5 %
GFR AFRICAN AMERICAN: >60
GFR NON-AFRICAN AMERICAN: >60
GLUCOSE BLD-MCNC: 90 MG/DL (ref 74–109)
HCT VFR BLD CALC: 33.5 % (ref 37–47)
HEMOGLOBIN: 11.4 G/DL (ref 12–16)
LYMPHOCYTES ABSOLUTE: 0.7 K/UL (ref 1–4.8)
LYMPHOCYTES RELATIVE PERCENT: 11.7 %
MCH RBC QN AUTO: 31.4 PG (ref 27–31.3)
MCHC RBC AUTO-ENTMCNC: 34.1 % (ref 33–37)
MCV RBC AUTO: 91.9 FL (ref 82–100)
MONOCYTES ABSOLUTE: 1 K/UL (ref 0.2–0.8)
MONOCYTES RELATIVE PERCENT: 16.1 %
NEUTROPHILS ABSOLUTE: 4.2 K/UL (ref 1.4–6.5)
NEUTROPHILS RELATIVE PERCENT: 68.1 %
PDW BLD-RTO: 15.4 % (ref 11.5–14.5)
PLATELET # BLD: 244 K/UL (ref 130–400)
POTASSIUM SERPL-SCNC: 4.1 MEQ/L (ref 3.5–5.1)
RBC # BLD: 3.65 M/UL (ref 4.2–5.4)
SODIUM BLD-SCNC: 134 MEQ/L (ref 132–144)
TRANSFERRIN: 159 MG/DL (ref 200–400)
URINE CULTURE, ROUTINE: NORMAL
WBC # BLD: 6.2 K/UL (ref 4.8–10.8)

## 2018-02-05 PROCEDURE — 97535 SELF CARE MNGMENT TRAINING: CPT

## 2018-02-05 PROCEDURE — 36415 COLL VENOUS BLD VENIPUNCTURE: CPT

## 2018-02-05 PROCEDURE — 80048 BASIC METABOLIC PNL TOTAL CA: CPT

## 2018-02-05 PROCEDURE — 97530 THERAPEUTIC ACTIVITIES: CPT

## 2018-02-05 PROCEDURE — 6370000000 HC RX 637 (ALT 250 FOR IP): Performed by: INTERNAL MEDICINE

## 2018-02-05 PROCEDURE — 1200000000 HC SEMI PRIVATE

## 2018-02-05 PROCEDURE — 85025 COMPLETE CBC W/AUTO DIFF WBC: CPT

## 2018-02-05 PROCEDURE — 97110 THERAPEUTIC EXERCISES: CPT

## 2018-02-05 RX ADMIN — DILTIAZEM HYDROCHLORIDE 60 MG: 30 TABLET, FILM COATED ORAL at 09:22

## 2018-02-05 RX ADMIN — CYCLOBENZAPRINE HYDROCHLORIDE 5 MG: 10 TABLET, FILM COATED ORAL at 21:25

## 2018-02-05 RX ADMIN — ATORVASTATIN CALCIUM 40 MG: 40 TABLET, FILM COATED ORAL at 21:25

## 2018-02-05 RX ADMIN — DILTIAZEM HYDROCHLORIDE 60 MG: 30 TABLET, FILM COATED ORAL at 21:25

## 2018-02-05 RX ADMIN — ACETAMINOPHEN 650 MG: 325 TABLET ORAL at 06:43

## 2018-02-05 RX ADMIN — ACETAMINOPHEN 650 MG: 325 TABLET ORAL at 12:43

## 2018-02-05 RX ADMIN — PANTOPRAZOLE SODIUM 40 MG: 40 TABLET, DELAYED RELEASE ORAL at 06:43

## 2018-02-05 RX ADMIN — DOCUSATE SODIUM 100 MG: 100 CAPSULE, LIQUID FILLED ORAL at 21:28

## 2018-02-05 RX ADMIN — DOCUSATE SODIUM 100 MG: 100 CAPSULE, LIQUID FILLED ORAL at 09:21

## 2018-02-05 RX ADMIN — ACETAMINOPHEN 650 MG: 325 TABLET ORAL at 22:26

## 2018-02-05 ASSESSMENT — PAIN DESCRIPTION - ORIENTATION
ORIENTATION: RIGHT

## 2018-02-05 ASSESSMENT — PAIN DESCRIPTION - FREQUENCY
FREQUENCY: CONTINUOUS

## 2018-02-05 ASSESSMENT — PAIN DESCRIPTION - PROGRESSION
CLINICAL_PROGRESSION: NOT CHANGED
CLINICAL_PROGRESSION: GRADUALLY IMPROVING
CLINICAL_PROGRESSION: NOT CHANGED
CLINICAL_PROGRESSION: GRADUALLY IMPROVING
CLINICAL_PROGRESSION: GRADUALLY IMPROVING
CLINICAL_PROGRESSION: GRADUALLY WORSENING
CLINICAL_PROGRESSION: GRADUALLY IMPROVING

## 2018-02-05 ASSESSMENT — PAIN SCALES - GENERAL
PAINLEVEL_OUTOF10: 6
PAINLEVEL_OUTOF10: 4
PAINLEVEL_OUTOF10: 5
PAINLEVEL_OUTOF10: 2
PAINLEVEL_OUTOF10: 2

## 2018-02-05 ASSESSMENT — PAIN DESCRIPTION - LOCATION
LOCATION: ARM;WRIST
LOCATION: WRIST;ARM
LOCATION: ARM;WRIST
LOCATION: WRIST;ARM

## 2018-02-05 ASSESSMENT — PAIN DESCRIPTION - DESCRIPTORS
DESCRIPTORS: ACHING

## 2018-02-05 ASSESSMENT — PAIN DESCRIPTION - PAIN TYPE
TYPE: ACUTE PAIN

## 2018-02-05 ASSESSMENT — PAIN DESCRIPTION - ONSET
ONSET: ON-GOING

## 2018-02-05 ASSESSMENT — PAIN SCALES - WONG BAKER
WONGBAKER_NUMERICALRESPONSE: 10
WONGBAKER_NUMERICALRESPONSE: 10

## 2018-02-05 NOTE — PROGRESS NOTES
Physical Therapy  Facility/Department: [facility] MED SURG UNIT  Daily Treatment Note  NAME: Logan Call  : 10/9/1926  MRN: [id number]    Date of Service: 2018    Patient Diagnosis(es):   Patient Active Problem List    Diagnosis Date Noted    Closed right radial fracture, closed, initial encounter 2018    Radial head fracture, closed, right, closed, initial encounter 2018    Radial head fracture, closed, left, closed, initial encounter 2018    Cellulitis of right leg 2018    Cellulitis and abscess of right lower extremity 2018    Bilateral carotid artery stenosis 10/27/2017    Central retinal artery occlusion, right eye 10/17/2017    Vision loss of right eye 10/17/2017    Status post total replacement of right hip 2017    Varicose veins of both lower extremities 2017    Bruit of left carotid artery 2017    Atrial fibrillation ([address] 2014    HTN (hypertension) 2013    Peripheral edema 2013    Vitamin D deficiency     Dyslipidemia     Osteoporosis     Chronic constipation     Neurodermatitis     Chronic venous insufficiency     Anxiety     Seronegative rheumatoid arthritis ([address]        Past Medical History:   Diagnosis Date    Anxiety     Atrial fibrillation ([address]     Bilateral carotid artery stenosis 10/27/2017    Central retinal artery occlusion, right eye 10/17/2017    Chronic constipation     Chronic venous insufficiency     Dyslipidemia     Essential hypertension     HTN (hypertension) 2013    Neurodermatitis     chronic, of bilateral lower extremities    Osteoporosis     Pap test, as part of routine gynecological examination     years ago   [facility] Screening mammogram     about 5 yrs ago    Seronegative rheumatoid arthritis ([address]     Status post total replacement of right hip 2017    Vision loss of right eye 10/17/2017    Overview:  - 80year old female who presents with PMH of HTN,AFIB(declined AC/aspirin),HLP presented to OSH yesterday evening with C/O right eye vision loss around 5 pm yesterday. Painless. - Had CT head without contrast at OSH ED that shows: No acute intracranial abnormality. 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 - EKG shows sinus bradycardia with PAC(R-58) - Concern for retinal artery occlusion?> 12 hours have passed. - Afib related? Carotid bruit? - BP was 210/85 initially and then came down to 160/69 -Normal CRP - No other acute neuro deficit seen. -Spoke to Optho on [facility]([id number])- alon Villalta and he will arrange for pt to be seen today at [facility]. No acute intervention needed as pt well out of time frame(as per optho). Plan: - Admit to [facility] consult. Await optho input and consider further rx options after etiology is confirmed. - Fall precautions - Neuro checks - ECHO - Carotid doppler  Will monitor.  Vitamin D deficiency      Past Surgical History:   Procedure Laterality Date    ROTATOR CUFF REPAIR Right     TONSILLECTOMY      TOTAL HIP ARTHROPLASTY Right 2009       Restrictions  Restrictions/Precautions  Restrictions/Precautions: Fall Risk (DECREASED VISION/ RIGHT EYE BLIND.)  Required Braces or Orthoses?: Yes  Implants present? : Metal implants  Upper Extremity Weight Bearing Restrictions  Right Upper Extremity Weight Bearing: Weight Bearing As Tolerated (PT HAS RIGHT ARM SLING.)  Required Braces or Orthoses  Right Lower Extremity Brace: Ankle Foot Orthotics  Left Lower Extremity Brace: Loli Foot Orthotics  Right Upper Extremity Brace/Splint:  (cast, sling when OOB)  Position Activity Restriction  Other position/activity restrictions: Kaur  Subjective   General  Chart Reviewed: Yes  Family / Caregiver Present: No  Referring Practitioner: Dr Baker Elena: Pt sitting in chair and agreeable to therapy.    Pain Screening  Patient Currently in Pain: Yes  Observations: \"I am so afraid of falling\"

## 2018-02-05 NOTE — PROGRESS NOTES
Nutrition Assessment    Type and Reason for Visit: Initial (subacute admit s/p fall with R radial fx)    Nutrition Recommendations: Continue current diet, Continue current ONS    Malnutrition Assessment:  · Malnutrition Status: At risk for malnutrition  · Context: Acute illness or injury  · Findings of the 6 clinical characteristics of malnutrition (Minimum of 2 out of 6 clinical characteristics is required to make the diagnosis of moderate or severe Protein Calorie Malnutrition based on AND/ASPEN Guidelines):  1. Energy Intake-Greater than 75%, greater than or equal to 5 days    2. Weight Loss-No significant weight loss,    3. Fat Loss-No significant subcutaneous fat loss,    4. Muscle Loss-Severe muscle mass loss, Clavicles (pectoralis and deltoids), Temples (temporalis muscle), Interosseous, Scapula (trapezius)  5. Fluid Accumulation-Mild fluid accumulation, Extremities    Nutrition Diagnosis:   · Problem: Increased nutrient needs  · Etiology: related to Acute injury/trauma     Signs and symptoms:  as evidenced by  (presence of fracture)    Nutrition Assessment:  · Subjective Assessment: Pt was visited as an inpatient on 2/2. She continues with poor-fair appetite. Appetite is effected by pain. She needs assistance with tray set-up. She has shown a slow weight gain trend since her last admit. · Nutrition-Focused Physical Findings: +1 RUE, +2 RLE edema. Skin intact, some bruising from fall. No BM noted since admit.   · Wound Type: None  · Current Nutrition Therapies:  · Oral Diet Orders: Dental Soft   · Oral Diet intake: 26-50%, 51-75%  · Oral Nutrition Supplement (ONS) Orders: Standard High Calorie Oral Supplement  · ONS intake: 51-75%  · Anthropometric Measures:  · Ht: 4' 10\" (147.3 cm)   · Current Body Wt: 114 lb 13.8 oz (52.1 kg)  · Admission Body Wt: 111 lb 15.9 oz (50.8 kg)  · Usual Body Wt: 111 lb 15.9 oz (50.8 kg)  · Ideal Body Wt: 100 lb 1.4 oz (45.4 kg), % Ideal Body > 100%  · BMI

## 2018-02-05 NOTE — PROGRESS NOTES
AC/aspirin),HLP presented to OSH yesterday evening with C/O right eye vision loss around 5 pm yesterday. Painless. - Had CT head without contrast at OSH ED that shows: No acute intracranial abnormality. 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 - EKG shows sinus bradycardia with PAC(R-58) - Concern for retinal artery occlusion?> 12 hours have passed. - Afib related? Carotid bruit? - BP was 210/85 initially and then came down to 160/69 -Normal CRP - No other acute neuro deficit seen. -Spoke to Optho on [facility]([id number])- alon Villalta and he will arrange for pt to be seen today at [facility]. No acute intervention needed as pt well out of time frame(as per optho). Plan: - Admit to [facility] consult. Await optho input and consider further rx options after etiology is confirmed. - Fall precautions - Neuro checks - ECHO - Carotid doppler  Will monitor.  Vitamin D deficiency      Past Surgical History:   Procedure Laterality Date    ROTATOR CUFF REPAIR Right     TONSILLECTOMY      TOTAL HIP ARTHROPLASTY Right 2009       Restrictions  Restrictions/Precautions  Restrictions/Precautions: (P) Fall Risk (DECREASED VISION/ RIGHT EYE BLIND.)  Required Braces or Orthoses?: (P) Yes  Implants present? : (P) Metal implants  Upper Extremity Weight Bearing Restrictions  Right Upper Extremity Weight Bearing: (P) Weight Bearing As Tolerated (PT HAS RIGHT ARM SLING.)  Required Braces or Orthoses  Right Lower Extremity Brace: (P) Ankle Foot Orthotics  Left Lower Extremity Brace: (P) Loli Foot Orthotics  Right Upper Extremity Brace/Splint: (P)  (cast, sling when OOB)  Position Activity Restriction  Other position/activity restrictions: Natasha  Subjective   General  Chart Reviewed: (P) Yes  Family / Caregiver Present: (P) No  Referring Practitioner: (P) Dr Beto Fritz  Subjective  Subjective: (P) Pt lying in bed and agreeable to therapy.  Pt states \"I have very little pain just lying

## 2018-02-06 ENCOUNTER — APPOINTMENT (OUTPATIENT)
Dept: CT IMAGING | Age: 83
DRG: 560 | End: 2018-02-06
Attending: INTERNAL MEDICINE
Payer: COMMERCIAL

## 2018-02-06 LAB
BACTERIA: ABNORMAL /HPF
BILIRUBIN URINE: NEGATIVE
BLOOD, URINE: ABNORMAL
CLARITY: ABNORMAL
COLOR: YELLOW
CRYSTALS, UA: ABNORMAL
EPITHELIAL CELLS, UA: ABNORMAL /HPF
GLUCOSE URINE: NEGATIVE MG/DL
KETONES, URINE: NEGATIVE MG/DL
LEUKOCYTE ESTERASE, URINE: ABNORMAL
NITRITE, URINE: POSITIVE
PH UA: >=9 (ref 5–9)
PROTEIN UA: 100 MG/DL
RBC UA: ABNORMAL /HPF (ref 0–2)
SPECIFIC GRAVITY UA: 1.01 (ref 1–1.03)
UROBILINOGEN, URINE: 1 E.U./DL
WBC UA: ABNORMAL /HPF (ref 0–5)

## 2018-02-06 PROCEDURE — 97110 THERAPEUTIC EXERCISES: CPT

## 2018-02-06 PROCEDURE — 87077 CULTURE AEROBIC IDENTIFY: CPT

## 2018-02-06 PROCEDURE — 87086 URINE CULTURE/COLONY COUNT: CPT

## 2018-02-06 PROCEDURE — 81001 URINALYSIS AUTO W/SCOPE: CPT

## 2018-02-06 PROCEDURE — 6370000000 HC RX 637 (ALT 250 FOR IP): Performed by: INTERNAL MEDICINE

## 2018-02-06 PROCEDURE — 87186 SC STD MICRODIL/AGAR DIL: CPT

## 2018-02-06 PROCEDURE — 97530 THERAPEUTIC ACTIVITIES: CPT

## 2018-02-06 PROCEDURE — 6360000004 HC RX CONTRAST MEDICATION: Performed by: INTERNAL MEDICINE

## 2018-02-06 PROCEDURE — 73701 CT LOWER EXTREMITY W/DYE: CPT

## 2018-02-06 PROCEDURE — 2580000003 HC RX 258: Performed by: INTERNAL MEDICINE

## 2018-02-06 PROCEDURE — 1200000000 HC SEMI PRIVATE

## 2018-02-06 RX ORDER — NITROFURANTOIN 25; 75 MG/1; MG/1
100 CAPSULE ORAL EVERY 12 HOURS SCHEDULED
Status: DISCONTINUED | OUTPATIENT
Start: 2018-02-06 | End: 2018-02-08

## 2018-02-06 RX ADMIN — IOPAMIDOL 100 ML: 755 INJECTION, SOLUTION INTRAVENOUS at 15:16

## 2018-02-06 RX ADMIN — TRAMADOL HYDROCHLORIDE 50 MG: 50 TABLET, COATED ORAL at 23:19

## 2018-02-06 RX ADMIN — ATORVASTATIN CALCIUM 40 MG: 40 TABLET, FILM COATED ORAL at 20:11

## 2018-02-06 RX ADMIN — CYCLOBENZAPRINE HYDROCHLORIDE 5 MG: 10 TABLET, FILM COATED ORAL at 14:40

## 2018-02-06 RX ADMIN — ACETAMINOPHEN 650 MG: 325 TABLET ORAL at 08:40

## 2018-02-06 RX ADMIN — TRAMADOL HYDROCHLORIDE 50 MG: 50 TABLET, COATED ORAL at 17:46

## 2018-02-06 RX ADMIN — NITROFURANTOIN (MONOHYDRATE/MACROCRYSTALS) 100 MG: 75; 25 CAPSULE ORAL at 20:12

## 2018-02-06 RX ADMIN — NITROFURANTOIN (MONOHYDRATE/MACROCRYSTALS) 100 MG: 75; 25 CAPSULE ORAL at 11:55

## 2018-02-06 RX ADMIN — PANTOPRAZOLE SODIUM 40 MG: 40 TABLET, DELAYED RELEASE ORAL at 05:35

## 2018-02-06 RX ADMIN — DILTIAZEM HYDROCHLORIDE 60 MG: 30 TABLET, FILM COATED ORAL at 08:40

## 2018-02-06 RX ADMIN — DOCUSATE SODIUM 100 MG: 100 CAPSULE, LIQUID FILLED ORAL at 08:40

## 2018-02-06 RX ADMIN — CYCLOBENZAPRINE HYDROCHLORIDE 5 MG: 10 TABLET, FILM COATED ORAL at 20:12

## 2018-02-06 RX ADMIN — TRAMADOL HYDROCHLORIDE 50 MG: 50 TABLET, COATED ORAL at 05:35

## 2018-02-06 RX ADMIN — DILTIAZEM HYDROCHLORIDE 60 MG: 30 TABLET, FILM COATED ORAL at 20:11

## 2018-02-06 RX ADMIN — TRAMADOL HYDROCHLORIDE 50 MG: 50 TABLET, COATED ORAL at 11:57

## 2018-02-06 RX ADMIN — DILTIAZEM HYDROCHLORIDE 60 MG: 30 TABLET, FILM COATED ORAL at 14:41

## 2018-02-06 RX ADMIN — Medication 10 ML: at 20:12

## 2018-02-06 RX ADMIN — DOCUSATE SODIUM 100 MG: 100 CAPSULE, LIQUID FILLED ORAL at 20:12

## 2018-02-06 ASSESSMENT — PAIN DESCRIPTION - LOCATION
LOCATION: ARM;WRIST
LOCATION: SHOULDER;NECK
LOCATION: ARM

## 2018-02-06 ASSESSMENT — PAIN SCALES - WONG BAKER
WONGBAKER_NUMERICALRESPONSE: 10
WONGBAKER_NUMERICALRESPONSE: 10

## 2018-02-06 ASSESSMENT — PAIN DESCRIPTION - ORIENTATION
ORIENTATION: RIGHT
ORIENTATION: RIGHT
ORIENTATION: LEFT

## 2018-02-06 ASSESSMENT — PAIN DESCRIPTION - PROGRESSION
CLINICAL_PROGRESSION: GRADUALLY IMPROVING
CLINICAL_PROGRESSION: GRADUALLY WORSENING
CLINICAL_PROGRESSION: GRADUALLY IMPROVING

## 2018-02-06 ASSESSMENT — PAIN DESCRIPTION - FREQUENCY
FREQUENCY: CONTINUOUS
FREQUENCY: CONTINUOUS

## 2018-02-06 ASSESSMENT — PAIN SCALES - GENERAL
PAINLEVEL_OUTOF10: 3
PAINLEVEL_OUTOF10: 5
PAINLEVEL_OUTOF10: 4
PAINLEVEL_OUTOF10: 7
PAINLEVEL_OUTOF10: 1
PAINLEVEL_OUTOF10: 7

## 2018-02-06 ASSESSMENT — PAIN DESCRIPTION - PAIN TYPE
TYPE: ACUTE PAIN
TYPE: ACUTE PAIN

## 2018-02-06 ASSESSMENT — PAIN DESCRIPTION - ONSET
ONSET: ON-GOING
ONSET: ON-GOING

## 2018-02-06 ASSESSMENT — PAIN DESCRIPTION - DESCRIPTORS
DESCRIPTORS: ACHING;SORE
DESCRIPTORS: ACHING

## 2018-02-06 NOTE — PROGRESS NOTES
Physical Therapy  Facility/Department: [facility] MED SURG UNIT  Daily Treatment Note  NAME: Daija Posada  : 10/9/1926  MRN: [id number]    Date of Service: 2018    Patient Diagnosis(es):   Patient Active Problem List    Diagnosis Date Noted    Closed right radial fracture, closed, initial encounter 2018    Radial head fracture, closed, right, closed, initial encounter 2018    Radial head fracture, closed, left, closed, initial encounter 2018    Cellulitis of right leg 2018    Cellulitis and abscess of right lower extremity 2018    Bilateral carotid artery stenosis 10/27/2017    Central retinal artery occlusion, right eye 10/17/2017    Vision loss of right eye 10/17/2017    Status post total replacement of right hip 2017    Varicose veins of both lower extremities 2017    Bruit of left carotid artery 2017    Atrial fibrillation ([address] 2014    HTN (hypertension) 2013    Peripheral edema 2013    Vitamin D deficiency     Dyslipidemia     Osteoporosis     Chronic constipation     Neurodermatitis     Chronic venous insufficiency     Anxiety     Seronegative rheumatoid arthritis ([address]        Past Medical History:   Diagnosis Date    Anxiety     Atrial fibrillation ([address]     Bilateral carotid artery stenosis 10/27/2017    Central retinal artery occlusion, right eye 10/17/2017    Chronic constipation     Chronic venous insufficiency     Dyslipidemia     Essential hypertension     HTN (hypertension) 2013    Neurodermatitis     chronic, of bilateral lower extremities    Osteoporosis     Pap test, as part of routine gynecological examination     years ago   [facility] Screening mammogram     about 5 yrs ago    Seronegative rheumatoid arthritis ([address]     Status post total replacement of right hip 2017    Vision loss of right eye 10/17/2017    Overview:  - 80year old female who presents with PMH of HTN,AFIB(declined AC/aspirin),HLP presented to OSH yesterday evening with C/O right eye vision loss around 5 pm yesterday. Painless. - Had CT head without contrast at OSH ED that shows: No acute intracranial abnormality. 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 - EKG shows sinus bradycardia with PAC(R-58) - Concern for retinal artery occlusion?> 12 hours have passed. - Afib related? Carotid bruit? - BP was 210/85 initially and then came down to 160/69 -Normal CRP - No other acute neuro deficit seen. -Spoke to Optho on [facility]([id number])- alon Villalta and he will arrange for pt to be seen today at [facility]. No acute intervention needed as pt well out of time frame(as per optho). Plan: - Admit to [facility] consult. Await optho input and consider further rx options after etiology is confirmed. - Fall precautions - Neuro checks - ECHO - Carotid doppler  Will monitor.  Vitamin D deficiency      Past Surgical History:   Procedure Laterality Date    ROTATOR CUFF REPAIR Right     TONSILLECTOMY      TOTAL HIP ARTHROPLASTY Right 2009       Restrictions  Restrictions/Precautions  Restrictions/Precautions: Fall Risk (DECREASED VISION/ RIGHT EYE BLIND.)  Required Braces or Orthoses?: Yes  Implants present? : Metal implants  Upper Extremity Weight Bearing Restrictions  Right Upper Extremity Weight Bearing: Weight Bearing As Tolerated (PT HAS RIGHT ARM SLING.)  Required Braces or Orthoses  Right Lower Extremity Brace: Ankle Foot Orthotics  Left Lower Extremity Brace: Loli Foot Orthotics  Right Upper Extremity Brace/Splint:  (cast, sling when OOB)  Position Activity Restriction  Other position/activity restrictions: Kaur  Subjective   General  Chart Reviewed: Yes  Family / Caregiver Present: No  Referring Practitioner: Dr Yi Pugh: Pt lying in bed and agreeable to therapy.    Pain Screening  Patient Currently in Pain: denies pain at rest  Patient Observation  Observations:

## 2018-02-06 NOTE — PROGRESS NOTES
Training  G-Code     OutComes Score                                           AM-PAC Score             Goals  Short term goals  Time Frame for Short term goals: 1 WEEK   Short term goal 1: Tolerate BUE ther ex/act [id number] to increase safety and I with ADL  Short term goal 2: Increase to maxA toileting tasks   Short term goal 3: PT  IND APPLY 2 SAFETY COMPENSATIONS FOR FALL PREVENTION TO DECRESE RISK FOR FALLS. Short term goal 4: Tolerate 1-2min standing endurance with modA to alleviate caregiver burden during ADL  Short term goal 5: Increase to modA UB dressing tasks  Long term goals  Time Frame for Long term goals : 2 WEEKS  Long term goal 1: Increase to Christi UB dressing tasks  Long term goal 2: Increase to CGA/Christi bed mob supine -> sit and modA sit -> supine using bed rails  Long term goal 3: Increase to Christi UB dressing tasks  Long term goal 4: Tolerate 2-4min standing endurance with Christi to alleviate caregiver burden during ADL  Patient Goals   Patient goals : RETURN HOME AT [facility].        Therapy Time   Individual Concurrent Group Co-treatment   Time In  9:05         Time Out  10:05         Minutes  [address] [place], [place]

## 2018-02-06 NOTE — PROGRESS NOTES
Leandro Owen is a 80 y.o. female patient. Pt was seen and evaluated, uirne has sedimentation    Current Facility-Administered Medications   Medication Dose Route Frequency Provider Last Rate Last Dose    nitrofurantoin (macrocrystal-monohydrate) (MACROBID) capsule 100 mg  100 mg Oral 2 times per day Wilbur Cruz MD        acetaminophen (TYLENOL) tablet 650 mg  650 mg Oral Q4H PRN Mirza Johnson MD   650 mg at 02/06/18 0840    magnesium hydroxide (MILK OF MAGNESIA) 400 MG/5ML suspension 30 mL  30 mL Oral Daily PRN Mirza Johnson MD        ondansetron [facility]) injection 4 mg  4 mg Intravenous Q6H PRN Mirza Johnson MD        oxyCODONE (ROXICODONE) immediate release tablet 5 mg  5 mg Oral Q4H PRN Mirza Johnson MD        Or    oxyCODONE (ROXICODONE) immediate release tablet 2.5 mg  2.5 mg Oral Q4H PRN Mirza Johnson MD        sodium chloride flush 0.9 % injection 10 mL  10 mL Intravenous 2 times per day Mirza Johnson MD   10 mL at 02/04/18 2006    sodium chloride flush 0.9 % injection 10 mL  10 mL Intravenous PRN Mirza Johnson MD        atorvastatin (LIPITOR) tablet 40 mg  40 mg Oral Daily Mirza Johnson MD   40 mg at 02/05/18 2125    cyclobenzaprine (FLEXERIL) tablet 5 mg  5 mg Oral TID Mirza Johnson MD   5 mg at 02/05/18 2125    diltiazem (CARDIZEM) tablet 60 mg  60 mg Oral TID Mirza Johnson MD   60 mg at 02/06/18 0840    docusate sodium (COLACE) capsule 100 mg  100 mg Oral BID Mirza Johnson MD   100 mg at 02/06/18 0840    lidocaine (LIDODERM) 5 % 1 patch  1 patch Transdermal Daily Mirza Johnson MD   1 patch at 02/06/18 0841    pantoprazole (PROTONIX) tablet 40 mg  40 mg Oral QAM AC Mirza Johnson MD   40 mg at 02/06/18 0535    traMADol (ULTRAM) tablet 50 mg  50 mg Oral 4 times per day Mirza Johnson MD   50 mg at 02/06/18 0535     Allergies   Allergen Reactions    Cephalexin     Cipro Xr     Doxycycline     Meprobamate     Penicillins     Relafen [Nabumetone]     Nsaids      Upset stomach at times. Principal Problem:    Closed right radial fracture, closed, initial encounter    Blood pressure (!) 143/57, pulse 69, temperature 97.3 °F (36.3 °C), temperature source Oral, resp. rate 18, height 4' 10\" (1.473 m), weight 114 lb 13.8 oz (52.1 kg), SpO2 98 %, not currently breastfeeding. Subjective:  Symptoms:  No shortness of breath, malaise, cough, chest pain, weakness, headache, chest pressure, anorexia, diarrhea or anxiety. Diet:  No nausea or vomiting. Objective:  General Appearance:  Comfortable, well-appearing and in no acute distress. Vital signs: (most recent): Blood pressure (!) 145/57, pulse 61, temperature 97.5 °F (36.4 °C), resp. rate 18, height 4' 11\" (1.499 m), weight 107 lb 5.8 oz (48.7 kg), SpO2 98 %, not currently breastfeeding. HEENT: Normal HEENT exam.    Lungs:  Normal effort and normal respiratory rate. Breath sounds clear to auscultation. Heart: Regular rhythm. S1 normal and S2 normal.    Abdomen: Abdomen is soft. Bowel sounds are normal.     Pulses: Distal pulses are intact. Neurological: Patient is alert. Pupils:  Pupils are equal, round, and reactive to light. Skin:  Warm and dry. Lab Results   Component Value Date    WBC 6.2 02/05/2018    HGB 11.4 (L) 02/05/2018    HCT 33.5 (L) 02/05/2018    MCV 91.9 02/05/2018     02/05/2018     Lab Results   Component Value Date     02/05/2018    K 4.1 02/05/2018    K 5.3 01/30/2018    CL 95 02/05/2018    CO2 24 02/05/2018    BUN 23 02/05/2018    CREATININE 0.42 02/05/2018    GLUCOSE 90 02/05/2018    CALCIUM 8.8 02/05/2018        Assessment & Plan  right hip hematoma xaralto on hold  2) acute blood loss anemia ( acute hemorrhagic anemia) s/p blood tansfusion  Right lower ext cellulitis  resolved  2) HTN stable  3) afib rate controlled  4) weakness Pt/OT  5) ?  UTI  UA an durine cx  Start Macrobid    6 Acute urinary re tension  C/w cruz for now could be 2 to UTI  Gisel Stinson MD  2/6/2018

## 2018-02-06 NOTE — PROGRESS NOTES
Physical Therapy  Facility/Department: [facility] MED SURG UNIT  Daily Treatment Note  NAME: Derry Angelucci  : 10/9/1926  MRN: [id number]    Date of Service: 2018    Patient Diagnosis(es):   Patient Active Problem List    Diagnosis Date Noted    Closed right radial fracture, closed, initial encounter 2018    Radial head fracture, closed, right, closed, initial encounter 2018    Radial head fracture, closed, left, closed, initial encounter 2018    Cellulitis of right leg 2018    Cellulitis and abscess of right lower extremity 2018    Bilateral carotid artery stenosis 10/27/2017    Central retinal artery occlusion, right eye 10/17/2017    Vision loss of right eye 10/17/2017    Status post total replacement of right hip 2017    Varicose veins of both lower extremities 2017    Bruit of left carotid artery 2017    Atrial fibrillation ([address] 2014    HTN (hypertension) 2013    Peripheral edema 2013    Vitamin D deficiency     Dyslipidemia     Osteoporosis     Chronic constipation     Neurodermatitis     Chronic venous insufficiency     Anxiety     Seronegative rheumatoid arthritis ([address]        Past Medical History:   Diagnosis Date    Anxiety     Atrial fibrillation ([address]     Bilateral carotid artery stenosis 10/27/2017    Central retinal artery occlusion, right eye 10/17/2017    Chronic constipation     Chronic venous insufficiency     Dyslipidemia     Essential hypertension     HTN (hypertension) 2013    Neurodermatitis     chronic, of bilateral lower extremities    Osteoporosis     Pap test, as part of routine gynecological examination     years ago   [facility] Screening mammogram     about 5 yrs ago    Seronegative rheumatoid arthritis ([address]     Status post total replacement of right hip 2017    Vision loss of right eye 10/17/2017    Overview:  - 80year old female who presents with PMH of HTN,AFIB(declined worst  Clinical Progression: Gradually improving  Response to Pain Intervention: Patient Satisfied  Vital Signs  Patient Currently in Pain: Yes  Patient Observation  Observations: \"I am so afraid of falling\"   Pre Treatment Pain Screening  Pain at present:  (4-5)  Scale Used: Numeric Score  Intervention List:  (Had pain med about an hour ago according to pt.)  Comments / Details:  (stomach pain secondary to needing to go to the [facility])    Orientation  Orientation  Overall Orientation Status: Within Functional Limits  Objective      Transfers  Sit to Stand: Moderate Assistance (mod Ax1/ min A x1)  Stand to sit: Moderate Assistance (modAx1 + minAx1 )  Ambulation  Ambulation?: No  WB Status:  (Pt too fearful and hip to painful to attempt ambuation today)  Stairs/Curb  Stairs?: No     Balance  Posture: Poor  Sitting - Static: Fair;-  Sitting - Dynamic: Fair  Standing - Static: Poor  Standing - Dynamic: Poor  Comments: Fearfullness decreases safety and balance  Exercises  Hip Flexion: 2x10  Hip Abduction: 2x10  Knee Long Arc Quad: 2x10  Ankle Pumps: 20x  Other exercises  Other exercises?: Yes  Other exercises 1: pillow sq 20x                        Assessment   Body structures, Functions, Activity limitations: Decreased functional mobility ; Decreased strength;Decreased endurance  Assessment: Pt very fearful of falling and having pain in right leg. pt sitting in chair upon arrival.  Sit to stand using rao-walker Mod A x1/ min A x1. Pt very fearful and unsteady. vc's to not use RUE. Practiced sit to stand using lilly stedy 2x. Pt less fearful with Christi x1 and CGAx1 Pt stood approx 1 1/2 minutes. Treatment Diagnosis: Decreased fucntional mobility  Prognosis: Poor  Patient Education: Educated about goals for rehab  REQUIRES PT FOLLOW UP: Yes  Activity Tolerance  Activity Tolerance: Patient limited by pain; Other  Activity Tolerance: Fear of falling preventing pt from progressing  PT Equipment Recommendations  Other: may need

## 2018-02-07 LAB
BASOPHILS ABSOLUTE: 0 K/UL (ref 0–0.2)
BASOPHILS RELATIVE PERCENT: 0.7 %
EOSINOPHILS ABSOLUTE: 0.5 K/UL (ref 0–0.7)
EOSINOPHILS RELATIVE PERCENT: 9.1 %
HCT VFR BLD CALC: 37.9 % (ref 37–47)
HEMOGLOBIN: 13 G/DL (ref 12–16)
LYMPHOCYTES ABSOLUTE: 1.1 K/UL (ref 1–4.8)
LYMPHOCYTES RELATIVE PERCENT: 18.2 %
MCH RBC QN AUTO: 31.3 PG (ref 27–31.3)
MCHC RBC AUTO-ENTMCNC: 34.4 % (ref 33–37)
MCV RBC AUTO: 91 FL (ref 82–100)
MONOCYTES ABSOLUTE: 0.8 K/UL (ref 0.2–0.8)
MONOCYTES RELATIVE PERCENT: 13.6 %
NEUTROPHILS ABSOLUTE: 3.4 K/UL (ref 1.4–6.5)
NEUTROPHILS RELATIVE PERCENT: 58.4 %
PDW BLD-RTO: 15.5 % (ref 11.5–14.5)
PLATELET # BLD: 328 K/UL (ref 130–400)
RBC # BLD: 4.16 M/UL (ref 4.2–5.4)
WBC # BLD: 5.9 K/UL (ref 4.8–10.8)

## 2018-02-07 PROCEDURE — 97112 NEUROMUSCULAR REEDUCATION: CPT

## 2018-02-07 PROCEDURE — 97530 THERAPEUTIC ACTIVITIES: CPT

## 2018-02-07 PROCEDURE — 97110 THERAPEUTIC EXERCISES: CPT

## 2018-02-07 PROCEDURE — 6370000000 HC RX 637 (ALT 250 FOR IP): Performed by: INTERNAL MEDICINE

## 2018-02-07 PROCEDURE — 2580000003 HC RX 258: Performed by: INTERNAL MEDICINE

## 2018-02-07 PROCEDURE — 1200000000 HC SEMI PRIVATE

## 2018-02-07 PROCEDURE — 36415 COLL VENOUS BLD VENIPUNCTURE: CPT

## 2018-02-07 PROCEDURE — 97535 SELF CARE MNGMENT TRAINING: CPT

## 2018-02-07 PROCEDURE — 85025 COMPLETE CBC W/AUTO DIFF WBC: CPT

## 2018-02-07 RX ADMIN — CYCLOBENZAPRINE HYDROCHLORIDE 5 MG: 10 TABLET, FILM COATED ORAL at 09:13

## 2018-02-07 RX ADMIN — NITROFURANTOIN (MONOHYDRATE/MACROCRYSTALS) 100 MG: 75; 25 CAPSULE ORAL at 09:13

## 2018-02-07 RX ADMIN — CYCLOBENZAPRINE HYDROCHLORIDE 5 MG: 10 TABLET, FILM COATED ORAL at 20:17

## 2018-02-07 RX ADMIN — ACETAMINOPHEN 650 MG: 325 TABLET ORAL at 10:44

## 2018-02-07 RX ADMIN — Medication 10 ML: at 09:16

## 2018-02-07 RX ADMIN — DILTIAZEM HYDROCHLORIDE 60 MG: 30 TABLET, FILM COATED ORAL at 20:16

## 2018-02-07 RX ADMIN — PANTOPRAZOLE SODIUM 40 MG: 40 TABLET, DELAYED RELEASE ORAL at 06:14

## 2018-02-07 RX ADMIN — DOCUSATE SODIUM 100 MG: 100 CAPSULE, LIQUID FILLED ORAL at 09:14

## 2018-02-07 RX ADMIN — DILTIAZEM HYDROCHLORIDE 60 MG: 30 TABLET, FILM COATED ORAL at 09:13

## 2018-02-07 RX ADMIN — TRAMADOL HYDROCHLORIDE 50 MG: 50 TABLET, COATED ORAL at 19:22

## 2018-02-07 RX ADMIN — NITROFURANTOIN (MONOHYDRATE/MACROCRYSTALS) 100 MG: 75; 25 CAPSULE ORAL at 20:17

## 2018-02-07 RX ADMIN — CYCLOBENZAPRINE HYDROCHLORIDE 5 MG: 10 TABLET, FILM COATED ORAL at 14:46

## 2018-02-07 RX ADMIN — DOCUSATE SODIUM 100 MG: 100 CAPSULE, LIQUID FILLED ORAL at 20:17

## 2018-02-07 RX ADMIN — ATORVASTATIN CALCIUM 40 MG: 40 TABLET, FILM COATED ORAL at 20:16

## 2018-02-07 RX ADMIN — TRAMADOL HYDROCHLORIDE 50 MG: 50 TABLET, COATED ORAL at 06:14

## 2018-02-07 RX ADMIN — DILTIAZEM HYDROCHLORIDE 60 MG: 30 TABLET, FILM COATED ORAL at 14:45

## 2018-02-07 RX ADMIN — Medication 10 ML: at 20:18

## 2018-02-07 RX ADMIN — TRAMADOL HYDROCHLORIDE 50 MG: 50 TABLET, COATED ORAL at 12:22

## 2018-02-07 ASSESSMENT — PAIN DESCRIPTION - PROGRESSION

## 2018-02-07 ASSESSMENT — PAIN SCALES - GENERAL
PAINLEVEL_OUTOF10: 6
PAINLEVEL_OUTOF10: 5
PAINLEVEL_OUTOF10: 7
PAINLEVEL_OUTOF10: 6
PAINLEVEL_OUTOF10: 8

## 2018-02-07 ASSESSMENT — PAIN DESCRIPTION - ORIENTATION
ORIENTATION: RIGHT
ORIENTATION: RIGHT

## 2018-02-07 ASSESSMENT — PAIN DESCRIPTION - PAIN TYPE
TYPE: ACUTE PAIN
TYPE: ACUTE PAIN

## 2018-02-07 ASSESSMENT — PAIN SCALES - WONG BAKER: WONGBAKER_NUMERICALRESPONSE: 10

## 2018-02-07 ASSESSMENT — PAIN DESCRIPTION - LOCATION
LOCATION: HIP
LOCATION: HIP

## 2018-02-07 NOTE — PROGRESS NOTES
pain)  Discharge Recommendations: Subacute/Skilled Nursing Facility;24 hour supervision or assist  REQUIRES OT FOLLOW UP: Yes       Discharge Recommendations:  [address], 24 hour supervision or assist     Plan   Plan  Times per week: 3-5  Times per day: Daily  Plan weeks: 2  Current Treatment Recommendations: Strengthening, Endurance Training, Neuromuscular Re-education, Patient/Caregiver Education & Training, ROM, Equipment Evaluation, Education, & procurement, Cognitive Reorientation, Self-Care / ADL, Home Management Training, Pain Management, Balance Training, Functional Mobility Training, Safety Education & Training  G-Code     OutComes Score                                           AM-PAC Score             Goals  Short term goals  Time Frame for Short term goals: 1 WEEK   Short term goal 1: Tolerate BUE ther ex/act [id number] to increase safety and I with ADL  Short term goal 2: Increase to maxA toileting tasks   Short term goal 3: PT  IND APPLY 2 SAFETY COMPENSATIONS FOR FALL PREVENTION TO DECRESE RISK FOR FALLS. Short term goal 4: Tolerate 1-2min standing endurance with modA to alleviate caregiver burden during ADL  Short term goal 5: Increase to modA UB dressing tasks  Long term goals  Time Frame for Long term goals : 2 WEEKS  Long term goal 1: Increase to Christi UB dressing tasks  Long term goal 2: Increase to CGA/Christi bed mob supine -> sit and modA sit -> supine using bed rails  Long term goal 3: Increase to Christi UB dressing tasks  Long term goal 4: Tolerate 2-4min standing endurance with Christi to alleviate caregiver burden during ADL  Patient Goals   Patient goals : RETURN HOME AT [facility].        Therapy Time   Individual Concurrent Group Co-treatment   Time In  10:25         Time Out  11:25         Minutes  [address] [place], [place]

## 2018-02-07 NOTE — PROGRESS NOTES
Physical Therapy  Facility/Department: [facility] MED SURG UNIT  Daily Treatment Note  NAME: Keven Bradley  : 10/9/1926  MRN: [id number]    Date of Service: 2018    Patient Diagnosis(es):   Patient Active Problem List    Diagnosis Date Noted    Closed right radial fracture, closed, initial encounter 2018    Radial head fracture, closed, right, closed, initial encounter 2018    Radial head fracture, closed, left, closed, initial encounter 2018    Cellulitis of right leg 2018    Cellulitis and abscess of right lower extremity 2018    Bilateral carotid artery stenosis 10/27/2017    Central retinal artery occlusion, right eye 10/17/2017    Vision loss of right eye 10/17/2017    Status post total replacement of right hip 2017    Varicose veins of both lower extremities 2017    Bruit of left carotid artery 2017    Atrial fibrillation ([address] 2014    HTN (hypertension) 2013    Peripheral edema 2013    Vitamin D deficiency     Dyslipidemia     Osteoporosis     Chronic constipation     Neurodermatitis     Chronic venous insufficiency     Anxiety     Seronegative rheumatoid arthritis ([address]        Past Medical History:   Diagnosis Date    Anxiety     Atrial fibrillation ([address]     Bilateral carotid artery stenosis 10/27/2017    Central retinal artery occlusion, right eye 10/17/2017    Chronic constipation     Chronic venous insufficiency     Dyslipidemia     Essential hypertension     HTN (hypertension) 2013    Neurodermatitis     chronic, of bilateral lower extremities    Osteoporosis     Pap test, as part of routine gynecological examination     years ago   [facility] Screening mammogram     about 5 yrs ago    Seronegative rheumatoid arthritis ([address]     Status post total replacement of right hip 2017    Vision loss of right eye 10/17/2017    Overview:  - 80year old female who presents with PMH of HTN,AFIB(declined

## 2018-02-07 NOTE — PROGRESS NOTES
Objective   Bed mobility  Scooting: Moderate assistance  Transfers  Sit to Stand: Minimal Assistance (minAx1+ CGAx1)  Stand to sit: Minimal Assistance (minAx1+ CGAx1)  Bed to Chair:  (minAx1+CGAx1 w/ use of lilly steady)        Balance  Sitting - Static: Fair;+  Sitting - Dynamic: Fair  Standing - Static: Fair;+  Standing - Dynamic: Fair;-  Comments: Pt performed dynamic seated balance sitting EOB to perform ADL's w/ 0 LOB and SBAx1 w/ 1 UE assist/occ 0 UE assist. pt performed static standing and weight shifting in lilly steady x 5 min before seated RB   Exercises  Hip Flexion: x 20 alternating  Hip Abduction: x20   Knee Long Arc Quad: 2 x 10             Cryotherapy (Minutes\Location): x15 min post to R arm and hip  to decrease pain            Assessment    Pt displays inc standing tolerance in lilly steady this date and was able to tolerate weight shifting in preparation for gait. Pt performed inc safety with transfers using lilly steady w/ inc follow through w/ VC's. Pt left to sit up in chair and was able to perform LE there ex with minimal c/o R hip pain. Donned ice to R arm and R hip for pain relief. Call light and chair alarm in place. Continue to progress as анна. Performed co-treat with OT for inc safety with transfers and secondary to pt's low activity tolerance. Body structures, Functions, Activity limitations: Decreased functional mobility ; Decreased strength;Decreased endurance  Treatment Diagnosis: Decreased fucntional mobility  REQUIRES PT FOLLOW UP: Yes  Activity Tolerance  Activity Tolerance: Patient limited by pain; Other  Activity Tolerance: Fear of falling preventing pt from progressing       Discharge Recommendations:  Subacute/Skilled Nursing Facility    G-Code     OutComes Score                                                    AM-PAC Score             Goals  Short term goals  Time Frame for Short term goals: 3-5 days  Short term goal 1: sit to stand with <= Min A x1   Short term goal 2: Perform

## 2018-02-08 LAB
ANION GAP SERPL CALCULATED.3IONS-SCNC: 15 MEQ/L (ref 7–13)
BASOPHILS ABSOLUTE: 0 K/UL (ref 0–0.2)
BASOPHILS RELATIVE PERCENT: 0.2 %
BUN BLDV-MCNC: 18 MG/DL (ref 8–23)
CALCIUM SERPL-MCNC: 9.3 MG/DL (ref 8.6–10.2)
CHLORIDE BLD-SCNC: 95 MEQ/L (ref 98–107)
CO2: 25 MEQ/L (ref 22–29)
CREAT SERPL-MCNC: 0.4 MG/DL (ref 0.5–0.9)
EOSINOPHILS ABSOLUTE: 0.3 K/UL (ref 0–0.7)
EOSINOPHILS RELATIVE PERCENT: 4.6 %
GFR AFRICAN AMERICAN: >60
GFR NON-AFRICAN AMERICAN: >60
GLUCOSE BLD-MCNC: 92 MG/DL (ref 74–109)
HCT VFR BLD CALC: 35.1 % (ref 37–47)
HEMOGLOBIN: 12 G/DL (ref 12–16)
LYMPHOCYTES ABSOLUTE: 0.7 K/UL (ref 1–4.8)
LYMPHOCYTES RELATIVE PERCENT: 9.8 %
MCH RBC QN AUTO: 31.4 PG (ref 27–31.3)
MCHC RBC AUTO-ENTMCNC: 34.2 % (ref 33–37)
MCV RBC AUTO: 91.8 FL (ref 82–100)
MONOCYTES ABSOLUTE: 0.8 K/UL (ref 0.2–0.8)
MONOCYTES RELATIVE PERCENT: 10.8 %
NEUTROPHILS ABSOLUTE: 5.4 K/UL (ref 1.4–6.5)
NEUTROPHILS RELATIVE PERCENT: 74.6 %
ORGANISM: ABNORMAL
PDW BLD-RTO: 15.4 % (ref 11.5–14.5)
PLATELET # BLD: 327 K/UL (ref 130–400)
POTASSIUM SERPL-SCNC: 4.3 MEQ/L (ref 3.5–5.1)
RBC # BLD: 3.82 M/UL (ref 4.2–5.4)
SODIUM BLD-SCNC: 135 MEQ/L (ref 132–144)
URINE CULTURE, ROUTINE: ABNORMAL
URINE CULTURE, ROUTINE: ABNORMAL
WBC # BLD: 7.3 K/UL (ref 4.8–10.8)

## 2018-02-08 PROCEDURE — 85025 COMPLETE CBC W/AUTO DIFF WBC: CPT

## 2018-02-08 PROCEDURE — 80048 BASIC METABOLIC PNL TOTAL CA: CPT

## 2018-02-08 PROCEDURE — 6370000000 HC RX 637 (ALT 250 FOR IP): Performed by: INTERNAL MEDICINE

## 2018-02-08 PROCEDURE — 97530 THERAPEUTIC ACTIVITIES: CPT

## 2018-02-08 PROCEDURE — 97112 NEUROMUSCULAR REEDUCATION: CPT

## 2018-02-08 PROCEDURE — 2580000003 HC RX 258: Performed by: INTERNAL MEDICINE

## 2018-02-08 PROCEDURE — 97535 SELF CARE MNGMENT TRAINING: CPT

## 2018-02-08 PROCEDURE — 1200000000 HC SEMI PRIVATE

## 2018-02-08 PROCEDURE — 36415 COLL VENOUS BLD VENIPUNCTURE: CPT

## 2018-02-08 RX ORDER — SULFAMETHOXAZOLE AND TRIMETHOPRIM 800; 160 MG/1; MG/1
1 TABLET ORAL EVERY 12 HOURS SCHEDULED
Status: DISCONTINUED | OUTPATIENT
Start: 2018-02-08 | End: 2018-02-08 | Stop reason: DRUGHIGH

## 2018-02-08 RX ORDER — SULFAMETHOXAZOLE AND TRIMETHOPRIM 800; 160 MG/1; MG/1
0.5 TABLET ORAL EVERY 12 HOURS SCHEDULED
Status: DISCONTINUED | OUTPATIENT
Start: 2018-02-08 | End: 2018-02-13

## 2018-02-08 RX ADMIN — DILTIAZEM HYDROCHLORIDE 60 MG: 30 TABLET, FILM COATED ORAL at 11:23

## 2018-02-08 RX ADMIN — ATORVASTATIN CALCIUM 40 MG: 40 TABLET, FILM COATED ORAL at 20:19

## 2018-02-08 RX ADMIN — CYCLOBENZAPRINE HYDROCHLORIDE 5 MG: 10 TABLET, FILM COATED ORAL at 11:18

## 2018-02-08 RX ADMIN — DOCUSATE SODIUM 100 MG: 100 CAPSULE, LIQUID FILLED ORAL at 20:19

## 2018-02-08 RX ADMIN — Medication 10 ML: at 11:24

## 2018-02-08 RX ADMIN — TRAMADOL HYDROCHLORIDE 50 MG: 50 TABLET, COATED ORAL at 17:58

## 2018-02-08 RX ADMIN — DILTIAZEM HYDROCHLORIDE 60 MG: 30 TABLET, FILM COATED ORAL at 15:06

## 2018-02-08 RX ADMIN — TRAMADOL HYDROCHLORIDE 50 MG: 50 TABLET, COATED ORAL at 11:30

## 2018-02-08 RX ADMIN — Medication 10 ML: at 21:48

## 2018-02-08 RX ADMIN — CYCLOBENZAPRINE HYDROCHLORIDE 5 MG: 10 TABLET, FILM COATED ORAL at 15:04

## 2018-02-08 RX ADMIN — SULFAMETHOXAZOLE AND TRIMETHOPRIM 0.5 TABLET: 800; 160 TABLET ORAL at 15:06

## 2018-02-08 RX ADMIN — TRAMADOL HYDROCHLORIDE 50 MG: 50 TABLET, COATED ORAL at 05:34

## 2018-02-08 RX ADMIN — TRAMADOL HYDROCHLORIDE 50 MG: 50 TABLET, COATED ORAL at 23:31

## 2018-02-08 RX ADMIN — SULFAMETHOXAZOLE AND TRIMETHOPRIM 0.5 TABLET: 800; 160 TABLET ORAL at 20:19

## 2018-02-08 RX ADMIN — DILTIAZEM HYDROCHLORIDE 60 MG: 30 TABLET, FILM COATED ORAL at 20:20

## 2018-02-08 RX ADMIN — CYCLOBENZAPRINE HYDROCHLORIDE 5 MG: 10 TABLET, FILM COATED ORAL at 20:18

## 2018-02-08 RX ADMIN — PANTOPRAZOLE SODIUM 40 MG: 40 TABLET, DELAYED RELEASE ORAL at 05:34

## 2018-02-08 RX ADMIN — DOCUSATE SODIUM 100 MG: 100 CAPSULE, LIQUID FILLED ORAL at 11:18

## 2018-02-08 ASSESSMENT — PAIN DESCRIPTION - PROGRESSION
CLINICAL_PROGRESSION: GRADUALLY IMPROVING

## 2018-02-08 ASSESSMENT — PAIN DESCRIPTION - PAIN TYPE
TYPE: ACUTE PAIN
TYPE: ACUTE PAIN

## 2018-02-08 ASSESSMENT — PAIN SCALES - GENERAL
PAINLEVEL_OUTOF10: 3
PAINLEVEL_OUTOF10: 0
PAINLEVEL_OUTOF10: 9
PAINLEVEL_OUTOF10: 3
PAINLEVEL_OUTOF10: 3
PAINLEVEL_OUTOF10: 6
PAINLEVEL_OUTOF10: 5

## 2018-02-08 ASSESSMENT — PAIN DESCRIPTION - LOCATION
LOCATION: KNEE
LOCATION: ARM;HIP
LOCATION: ARM;HIP

## 2018-02-08 ASSESSMENT — PAIN DESCRIPTION - ORIENTATION
ORIENTATION: RIGHT
ORIENTATION: RIGHT

## 2018-02-08 NOTE — PROGRESS NOTES
Spoke with Shanika from [facility] will not have any open beds until next week.   SS will follow with patient and family to identify 2nd choice or discuss waiting until next week with the team.

## 2018-02-08 NOTE — PROGRESS NOTES
1: Increase to Christi UB dressing tasks  Long term goal 2: Increase to CGA/Christi bed mob supine -> sit and modA sit -> supine using bed rails  Long term goal 3: Increase to Christi UB dressing tasks  Long term goal 4: Tolerate 2-4min standing endurance with Christi to alleviate caregiver burden during ADL  Patient Goals   Patient goals : RETURN HOME AT [facility].        Therapy Time   Individual Concurrent Group Co-treatment   Time In  1:55pm         Time Out  2:50pm         Minutes  [address] and [employer]ation [employer]  Therapy License Number: [id number]

## 2018-02-08 NOTE — PROGRESS NOTES
3  Pain Type: Acute pain  Pain Location: Arm; Hip  Pain Orientation: Right  Vital Signs  Patient Currently in Pain: Yes       Orientation     Objective   Bed mobility  Scooting: Contact guard assistance  Transfers  Sit to Stand: Maximum Assistance (maxAx2 )  Stand to sit: Maximum Assistance (maxAx2)  Comment: very fearful, kept moving feet in lilly steady and unable to complete upright stand. Balance  Standing - Static: Poor  Standing - Dynamic: Poor  Comments: fearful of falling                           Assessment   Body structures, Functions, Activity limitations: Decreased functional mobility ; Decreased strength;Decreased endurance  Assessment: Pt fearful and anxious during transfers and limited by fatigue. Pt continues to move LE's while in LAHTI steady w/ max VC's for sequencing w/ poor follow through. Pt performed transfer from chair>toilet>bed w/ maxAx2. Call light and bed alarm in place. Continue to progress as анна. Treatment Diagnosis: Decreased fucntional mobility  REQUIRES PT FOLLOW UP: Yes  Activity Tolerance  Activity Tolerance: Patient limited by pain; Other       Discharge Recommendations:  Subacute/Skilled Nursing Facility    G-Code     OutComes Score                                                    AM-PAC Score             Goals  Short term goals  Time Frame for Short term goals: 3-5 days  Short term goal 1: sit to stand with <= Min A x1   Short term goal 2: Perform stand pivot transfers with  mod assit x1  Short term goal 3: tolerate 2x10 reps LE LUDIN to increase strength for functional mobility  Long term goals  Time Frame for Long term goals : 2-3 weeks  Long term goal 1: Perform stand pivot transfwers with supervision  Long term goal 2: Stand with hemiwalker and SBA x2 min without LOB  Long term goal 3: Perform expercises to support ambuation and transfers  Long term goal 4: Esablish HEP  Patient Goals   Patient goals : I want to walk again like I used to    Plan    Plan  Times per

## 2018-02-08 NOTE — PROGRESS NOTES
Physical Therapy  Facility/Department: [facility] MED SURG UNIT  Daily Treatment Note  NAME: Sommer Chance  : 10/9/1926  MRN: [id number]    Date of Service: 2018    Patient Diagnosis(es):   Patient Active Problem List    Diagnosis Date Noted    Closed right radial fracture, closed, initial encounter 2018    Radial head fracture, closed, right, closed, initial encounter 2018    Radial head fracture, closed, left, closed, initial encounter 2018    Cellulitis of right leg 2018    Cellulitis and abscess of right lower extremity 2018    Bilateral carotid artery stenosis 10/27/2017    Central retinal artery occlusion, right eye 10/17/2017    Vision loss of right eye 10/17/2017    Status post total replacement of right hip 2017    Varicose veins of both lower extremities 2017    Bruit of left carotid artery 2017    Atrial fibrillation ([address] 2014    HTN (hypertension) 2013    Peripheral edema 2013    Vitamin D deficiency     Dyslipidemia     Osteoporosis     Chronic constipation     Neurodermatitis     Chronic venous insufficiency     Anxiety     Seronegative rheumatoid arthritis ([address]        Past Medical History:   Diagnosis Date    Anxiety     Atrial fibrillation ([address]     Bilateral carotid artery stenosis 10/27/2017    Central retinal artery occlusion, right eye 10/17/2017    Chronic constipation     Chronic venous insufficiency     Dyslipidemia     Essential hypertension     HTN (hypertension) 2013    Neurodermatitis     chronic, of bilateral lower extremities    Osteoporosis     Pap test, as part of routine gynecological examination     years ago   [facility] Screening mammogram     about 5 yrs ago    Seronegative rheumatoid arthritis ([address]     Status post total replacement of right hip 2017    Vision loss of right eye 10/17/2017    Overview:  - 80year old female who presents with PMH of HTN,AFIB(declined goal 3: Perform expercises to support ambuation and transfers  Long term goal 4: Esablish HEP  Patient Goals   Patient goals : I want to walk again like I used to    Plan    Plan  Times per week: 5-7x/week   Times per day:  (1-2x)  Plan weeks: 3 weeks  Current Treatment Recommendations: Strengthening, Gait Training, Endurance Training, Transfer Training, Positioning, Pain Management  Safety Devices  Type of devices:  All fall risk precautions in place, Bed alarm in place, Call light within reach, Gait belt, Left in chair  Restraints  Initially in place: Yes     Therapy Time   Individual Concurrent Group Co-treatment   Time In  1120         Time Out  1200         Minutes  [address], [facility]  License and [address] Number: [id number]

## 2018-02-08 NOTE — PROGRESS NOTES
Renal Adjustment Per Protocol:  Bactrim DS 1 po BID changed to Bactrim SS 1 po BID based on    Recent Labs      02/08/18   0532   CREATININE  0.40*    CrCl calculated = 30 ml/min

## 2018-02-08 NOTE — PROGRESS NOTES
Purvi Del Rio is a 80 y.o. female patient.  Pt was seen and evaluated, no overnight events no new complaints    Current Facility-Administered Medications   Medication Dose Route Frequency Provider Last Rate Last Dose    sulfamethoxazole-trimethoprim (BACTRIM DS;SEPTRA DS) 800-160 MG per tablet 1 tablet  1 tablet Oral 2 times per day Taty Dong MD        acetaminophen (TYLENOL) tablet 650 mg  650 mg Oral Q4H PRN Mariella Eagle MD   650 mg at 02/07/18 1044    magnesium hydroxide (MILK OF MAGNESIA) 400 MG/5ML suspension 30 mL  30 mL Oral Daily PRN Mariella Eagle MD        [facility] injection 4 mg  4 mg Intravenous Q6H PRN Mariella Eagle MD        oxyCODONE (ROXICODONE) immediate release tablet 5 mg  5 mg Oral Q4H PRN Mariella Eagle MD        Or    oxyCODONE (ROXICODONE) immediate release tablet 2.5 mg  2.5 mg Oral Q4H PRN Mariella Eagle MD        sodium chloride flush 0.9 % injection 10 mL  10 mL Intravenous 2 times per day Mariella Eagle MD   10 mL at 02/07/18 2018    sodium chloride flush 0.9 % injection 10 mL  10 mL Intravenous PRN Mariella Eagle MD        atorvastatin (LIPITOR) tablet 40 mg  40 mg Oral Daily Mariella Eagle MD   40 mg at 02/07/18 2016    cyclobenzaprine (FLEXERIL) tablet 5 mg  5 mg Oral TID Mariella Eagle MD   5 mg at 02/07/18 2017    diltiazem (CARDIZEM) tablet 60 mg  60 mg Oral TID Mariella Eagle MD   60 mg at 02/07/18 2016    docusate sodium (COLACE) capsule 100 mg  100 mg Oral BID Mariella Eagle MD   100 mg at 02/07/18 2017    lidocaine (LIDODERM) 5 % 1 patch  1 patch Transdermal Daily Mariella Eagle MD   1 patch at 02/07/18 0912    pantoprazole (PROTONIX) tablet 40 mg  40 mg Oral QAM AC Mariella Eagle MD   40 mg at 02/08/18 0534    traMADol (ULTRAM) tablet 50 mg  50 mg Oral 4 times per day Mariella Eagle MD   50 mg at 02/08/18 0534     Allergies   Allergen Reactions    Cephalexin     Cipro Xr     Doxycycline     Meprobamate     Penicillins     Relafen [Nabumetone]     Nsaids

## 2018-02-09 PROCEDURE — 6370000000 HC RX 637 (ALT 250 FOR IP): Performed by: INTERNAL MEDICINE

## 2018-02-09 PROCEDURE — 97110 THERAPEUTIC EXERCISES: CPT

## 2018-02-09 PROCEDURE — 1200000000 HC SEMI PRIVATE

## 2018-02-09 PROCEDURE — 97530 THERAPEUTIC ACTIVITIES: CPT

## 2018-02-09 PROCEDURE — 97535 SELF CARE MNGMENT TRAINING: CPT

## 2018-02-09 PROCEDURE — 2580000003 HC RX 258: Performed by: INTERNAL MEDICINE

## 2018-02-09 RX ADMIN — DOCUSATE SODIUM 100 MG: 100 CAPSULE, LIQUID FILLED ORAL at 08:42

## 2018-02-09 RX ADMIN — PANTOPRAZOLE SODIUM 40 MG: 40 TABLET, DELAYED RELEASE ORAL at 05:31

## 2018-02-09 RX ADMIN — TRAMADOL HYDROCHLORIDE 50 MG: 50 TABLET, COATED ORAL at 05:31

## 2018-02-09 RX ADMIN — TRAMADOL HYDROCHLORIDE 50 MG: 50 TABLET, COATED ORAL at 11:29

## 2018-02-09 RX ADMIN — TRAMADOL HYDROCHLORIDE 50 MG: 50 TABLET, COATED ORAL at 17:17

## 2018-02-09 RX ADMIN — DILTIAZEM HYDROCHLORIDE 60 MG: 30 TABLET, FILM COATED ORAL at 08:42

## 2018-02-09 RX ADMIN — Medication 10 ML: at 21:58

## 2018-02-09 RX ADMIN — CYCLOBENZAPRINE HYDROCHLORIDE 5 MG: 10 TABLET, FILM COATED ORAL at 21:55

## 2018-02-09 RX ADMIN — CYCLOBENZAPRINE HYDROCHLORIDE 5 MG: 10 TABLET, FILM COATED ORAL at 15:29

## 2018-02-09 RX ADMIN — DOCUSATE SODIUM 100 MG: 100 CAPSULE, LIQUID FILLED ORAL at 21:55

## 2018-02-09 RX ADMIN — ATORVASTATIN CALCIUM 40 MG: 40 TABLET, FILM COATED ORAL at 21:55

## 2018-02-09 RX ADMIN — SULFAMETHOXAZOLE AND TRIMETHOPRIM 0.5 TABLET: 800; 160 TABLET ORAL at 21:55

## 2018-02-09 RX ADMIN — DILTIAZEM HYDROCHLORIDE 60 MG: 30 TABLET, FILM COATED ORAL at 21:54

## 2018-02-09 RX ADMIN — CYCLOBENZAPRINE HYDROCHLORIDE 5 MG: 10 TABLET, FILM COATED ORAL at 08:42

## 2018-02-09 RX ADMIN — SULFAMETHOXAZOLE AND TRIMETHOPRIM 0.5 TABLET: 800; 160 TABLET ORAL at 08:42

## 2018-02-09 ASSESSMENT — PAIN SCALES - GENERAL
PAINLEVEL_OUTOF10: 3
PAINLEVEL_OUTOF10: 4
PAINLEVEL_OUTOF10: 3
PAINLEVEL_OUTOF10: 0
PAINLEVEL_OUTOF10: 3
PAINLEVEL_OUTOF10: 0
PAINLEVEL_OUTOF10: 3

## 2018-02-09 ASSESSMENT — PAIN DESCRIPTION - PAIN TYPE
TYPE: ACUTE PAIN
TYPE: ACUTE PAIN

## 2018-02-09 ASSESSMENT — PAIN DESCRIPTION - ORIENTATION
ORIENTATION: RIGHT
ORIENTATION: RIGHT

## 2018-02-09 ASSESSMENT — PAIN DESCRIPTION - LOCATION
LOCATION: ARM
LOCATION: ARM

## 2018-02-09 NOTE — PLAN OF CARE
Problem: Nutrition  Goal: Optimal nutrition therapy  Nutrition Problem: Increased nutrient needs  Intervention: Food and/or Nutrient Delivery: Continue current diet, Continue current ONS  Nutritional Goals: po intake > 75% of meals & supplements.    Outcome: Ongoing

## 2018-02-09 NOTE — PROGRESS NOTES
Chart reviewed. Patient's care discussed in daily quality rounds. This  met with patient and patient's family (son and D-I-L Isis Floresr). Patient and family advised that [facility] currently has no beds available however reports bed may be come available next week. Family reports desire to wait until next week in hopes that bed will become available at [facility] which is reported to be closer to family's home. Patient agreeable. Family reports that if bed is not available next week then family desires for patient to transfer to [facility]. Patient agreeable. SS to continue to follow.

## 2018-02-09 NOTE — PROGRESS NOTES
AC/aspirin),HLP presented to OSH yesterday evening with C/O right eye vision loss around 5 pm yesterday. Painless. - Had CT head without contrast at OSH ED that shows: No acute intracranial abnormality. 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 - EKG shows sinus bradycardia with PAC(R-58) - Concern for retinal artery occlusion?> 12 hours have passed. - Afib related? Carotid bruit? - BP was 210/85 initially and then came down to 160/69 -Normal CRP - No other acute neuro deficit seen. -Spoke to Optho on YRMX([id number])- alon Villalta and he will arrange for pt to be seen today at [facility]. No acute intervention needed as pt well out of time frame(as per optho). Plan: - Admit to [facility] consult. Await optho input and consider further rx options after etiology is confirmed. - Fall precautions - Neuro checks - ECHO - Carotid doppler  Will monitor.  Vitamin D deficiency      Past Surgical History:   Procedure Laterality Date    ROTATOR CUFF REPAIR Right     TONSILLECTOMY      TOTAL HIP ARTHROPLASTY Right 2009       Restrictions  Restrictions/Precautions  Restrictions/Precautions: Fall Risk  Required Braces or Orthoses?: Yes  Implants present? : Metal implants  Upper Extremity Weight Bearing Restrictions  Right Upper Extremity Weight Bearing: Weight Bearing As Tolerated  Required Braces or Orthoses  Right Lower Extremity Brace: Ankle Foot Orthotics  Left Lower Extremity Brace: Loli Foot Orthotics  Right Upper Extremity Brace/Splint:  (cast, sling when OOB)  Position Activity Restriction  Other position/activity restrictions: Natasha  Subjective   General  Chart Reviewed: Yes  Family / Caregiver Present: No  Referring Practitioner: Dr Andreea Galaviz: Pt sitting up in chair and agreeable to therapy session.   Pain Screening  Patient Currently in Pain: Yes  Pain Assessment  Pain Assessment: 0-10  Pain Level: 3  Pain Type: Acute pain  Pain Location: Arm  Pain Orientation: Right  Vital Signs  Patient Currently in Pain: Yes       Orientation     Objective      Transfers  Sit to Stand: Minimal Assistance (minAx1 in lilly steady)  Stand to sit: Minimal Assistance (minAx1 in lilly steady)  Bed to Chair: Minimal assistance (minAx1 w/ use of lilly steady)  Comment: pt did well pulling with LUE to stand at lilly steady w/ occ minAx1 from low chairs. Wheelchair Activities  Propulsion: Yes  Propulsion 1  Propulsion: Manual  Level: Level Tile  Method: RLE;LLE  Distance: 100' SBA, 48' w/ Christi        Exercises  Hamstring Sets: x 20 YTB  Hip Flexion: x 20 alternating  Hip Abduction: x 20   Knee Long Arc Quad: x 20   Ankle Pumps: 20x  Other exercises  Other exercises?: Yes  Other exercises 1: pillow sq 20x                        Assessment   Body structures, Functions, Activity limitations: Decreased functional mobility ; Decreased strength;Decreased endurance  Assessment: Pt displays increased safety w/ transfers using lilly steady when pt pulls to stand and feels more in control w/ less assistance. Pt performed self propelling in WC with BLE's. Pt also tolerated inc reps with LE ther ex. Pt returned to room and performed toilet transfer w/ use of lilly steady CGA/minAx1. Pt transferred to bed. Call light and bed alarm in place. Continue to progress as tolerated. Treatment Diagnosis: Decreased fucntional mobility  REQUIRES PT FOLLOW UP: Yes  Activity Tolerance  Activity Tolerance: Patient limited by pain; Other  Activity Tolerance: Fear of falling preventing pt from progressing  PT Equipment Recommendations  Other: may need wide WILFRIDO hemiwalker one handle use for LUE support due to previous use of ww for mobility and likely NWB RUE with new fracture       Discharge Recommendations:  Subacute/Skilled Nursing Facility    G-Code     OutComes Score                                                    AM-PAC Score             Goals  Short term goals  Time Frame for Short term goals: 3-5

## 2018-02-09 NOTE — PROGRESS NOTES
Constipation    See Adult Nutrition Doc Flowsheet for more detail.      Electronically signed by Gato Gould RD, LD on 2/9/18 at 12:00 PM

## 2018-02-09 NOTE — PROGRESS NOTES
AC/aspirin),HLP presented to OSH yesterday evening with C/O right eye vision loss around 5 pm yesterday. Painless. - Had CT head without contrast at OSH ED that shows: No acute intracranial abnormality. 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 - EKG shows sinus bradycardia with PAC(R-58) - Concern for retinal artery occlusion?> 12 hours have passed. - Afib related? Carotid bruit? - BP was 210/85 initially and then came down to 160/69 -Normal CRP - No other acute neuro deficit seen. -Spoke to Optho on COIY([id number])- alon Villalta and he will arrange for pt to be seen today at [facility]. No acute intervention needed as pt well out of time frame(as per optho). Plan: - Admit to [facility] consult. Await optho input and consider further rx options after etiology is confirmed. - Fall precautions - Neuro checks - ECHO - Carotid doppler  Will monitor.  Vitamin D deficiency      Past Surgical History:   Procedure Laterality Date    ROTATOR CUFF REPAIR Right     TONSILLECTOMY      TOTAL HIP ARTHROPLASTY Right 2009       Restrictions  Restrictions/Precautions  Restrictions/Precautions: Fall Risk  Required Braces or Orthoses?: Yes  Implants present? : Metal implants  Upper Extremity Weight Bearing Restrictions  Right Upper Extremity Weight Bearing: Weight Bearing As Tolerated  Required Braces or Orthoses  Right Lower Extremity Brace: Ankle Foot Orthotics  Left Lower Extremity Brace: Loli Foot Orthotics  Right Upper Extremity Brace/Splint:  (cast, sling when OOB)  Position Activity Restriction  Other position/activity restrictions: Natasha  Subjective   General  Chart Reviewed: Yes  Family / Caregiver Present: No  Referring Practitioner: Dr Silver Olea: Pt lying in bed and agreeable to therapy.    Pain Screening  Patient Currently in Pain: Yes  Pain Assessment  Pain Assessment: 0-10  Pain Level: 3  Pain Type: Acute pain  Pain Location: Arm  Pain Orientation: Right  Vital Signs  Patient Currently in Pain: Yes       Orientation     Objective      Transfers  Sit to Stand: Minimal Assistance (minAx2)  Stand to sit: Minimal Assistance; Moderate Assistance (modAx2 for controlled descent)  Bed to Chair: Minimal assistance (lilly steady minAx2)  Comment: pt had better follow through with VC's and did not move her feet while in lilly steady to increase safety with transfers. Pt able to come to a more upright posture with standing and was even able to perform a 2 min  lilly steady with CGAx1                                    Assessment   Body structures, Functions, Activity limitations: Decreased functional mobility ; Decreased strength;Decreased endurance  Assessment: Pt displays inc safety and follow through w/ VC's for transfers this date. Pt left to sit up in chair with call light and chair alarm in place. Ended session early secondary to outpatient needing to be seen. Treatment Diagnosis: Decreased fucntional mobility  REQUIRES PT FOLLOW UP: Yes  Activity Tolerance  Activity Tolerance: Patient limited by pain; Other  Activity Tolerance: Fear of falling preventing pt from progressing  PT Equipment Recommendations  Other: may need wide WILFRIDO hemiwalker one handle use for LUE support due to previous use of ww for mobility and likely NWB RUE with new fracture       Discharge Recommendations:  Subacute/Skilled Nursing Facility    G-Code     OutComes Score                                                    AM-PAC Score             Goals  Short term goals  Time Frame for Short term goals: 3-5 days  Short term goal 1: sit to stand with <= Min A x1   Short term goal 2: Perform stand pivot transfers with  mod assit x1  Short term goal 3: tolerate 2x10 reps LE LUDIN to increase strength for functional mobility  Long term goals  Time Frame for Long term goals : 2-3 weeks  Long term goal 1: Perform stand pivot transfwers with supervision  Long term goal 2: Stand with

## 2018-02-10 PROCEDURE — 97535 SELF CARE MNGMENT TRAINING: CPT

## 2018-02-10 PROCEDURE — 97530 THERAPEUTIC ACTIVITIES: CPT

## 2018-02-10 PROCEDURE — 1200000000 HC SEMI PRIVATE

## 2018-02-10 PROCEDURE — 6370000000 HC RX 637 (ALT 250 FOR IP): Performed by: INTERNAL MEDICINE

## 2018-02-10 PROCEDURE — 2580000003 HC RX 258: Performed by: INTERNAL MEDICINE

## 2018-02-10 RX ADMIN — ACETAMINOPHEN 650 MG: 325 TABLET ORAL at 18:14

## 2018-02-10 RX ADMIN — TRAMADOL HYDROCHLORIDE 50 MG: 50 TABLET, COATED ORAL at 18:15

## 2018-02-10 RX ADMIN — CYCLOBENZAPRINE HYDROCHLORIDE 5 MG: 10 TABLET, FILM COATED ORAL at 21:57

## 2018-02-10 RX ADMIN — DOCUSATE SODIUM 100 MG: 100 CAPSULE, LIQUID FILLED ORAL at 21:57

## 2018-02-10 RX ADMIN — TRAMADOL HYDROCHLORIDE 50 MG: 50 TABLET, COATED ORAL at 23:30

## 2018-02-10 RX ADMIN — TRAMADOL HYDROCHLORIDE 50 MG: 50 TABLET, COATED ORAL at 00:35

## 2018-02-10 RX ADMIN — Medication 10 ML: at 21:58

## 2018-02-10 RX ADMIN — ACETAMINOPHEN 650 MG: 325 TABLET ORAL at 08:40

## 2018-02-10 RX ADMIN — DOCUSATE SODIUM 100 MG: 100 CAPSULE, LIQUID FILLED ORAL at 08:40

## 2018-02-10 RX ADMIN — PANTOPRAZOLE SODIUM 40 MG: 40 TABLET, DELAYED RELEASE ORAL at 06:26

## 2018-02-10 RX ADMIN — TRAMADOL HYDROCHLORIDE 50 MG: 50 TABLET, COATED ORAL at 12:14

## 2018-02-10 RX ADMIN — ATORVASTATIN CALCIUM 40 MG: 40 TABLET, FILM COATED ORAL at 21:58

## 2018-02-10 RX ADMIN — DILTIAZEM HYDROCHLORIDE 60 MG: 30 TABLET, FILM COATED ORAL at 14:20

## 2018-02-10 RX ADMIN — SULFAMETHOXAZOLE AND TRIMETHOPRIM 0.5 TABLET: 800; 160 TABLET ORAL at 21:58

## 2018-02-10 RX ADMIN — CYCLOBENZAPRINE HYDROCHLORIDE 5 MG: 10 TABLET, FILM COATED ORAL at 08:40

## 2018-02-10 RX ADMIN — TRAMADOL HYDROCHLORIDE 50 MG: 50 TABLET, COATED ORAL at 06:26

## 2018-02-10 RX ADMIN — DILTIAZEM HYDROCHLORIDE 60 MG: 30 TABLET, FILM COATED ORAL at 08:40

## 2018-02-10 RX ADMIN — DILTIAZEM HYDROCHLORIDE 60 MG: 30 TABLET, FILM COATED ORAL at 21:58

## 2018-02-10 RX ADMIN — CYCLOBENZAPRINE HYDROCHLORIDE 5 MG: 10 TABLET, FILM COATED ORAL at 14:14

## 2018-02-10 RX ADMIN — SULFAMETHOXAZOLE AND TRIMETHOPRIM 0.5 TABLET: 800; 160 TABLET ORAL at 08:40

## 2018-02-10 ASSESSMENT — PAIN SCALES - GENERAL
PAINLEVEL_OUTOF10: 0
PAINLEVEL_OUTOF10: 6
PAINLEVEL_OUTOF10: 6
PAINLEVEL_OUTOF10: 5
PAINLEVEL_OUTOF10: 6
PAINLEVEL_OUTOF10: 4
PAINLEVEL_OUTOF10: 8

## 2018-02-10 ASSESSMENT — PAIN SCALES - WONG BAKER: WONGBAKER_NUMERICALRESPONSE: 4

## 2018-02-10 ASSESSMENT — PAIN DESCRIPTION - PAIN TYPE
TYPE: CHRONIC PAIN
TYPE: ACUTE PAIN

## 2018-02-10 ASSESSMENT — PAIN DESCRIPTION - LOCATION
LOCATION: SHOULDER
LOCATION: HIP

## 2018-02-10 ASSESSMENT — PAIN DESCRIPTION - FREQUENCY: FREQUENCY: CONTINUOUS

## 2018-02-10 ASSESSMENT — PAIN DESCRIPTION - DESCRIPTORS: DESCRIPTORS: DISCOMFORT

## 2018-02-10 ASSESSMENT — PAIN DESCRIPTION - ORIENTATION
ORIENTATION: RIGHT
ORIENTATION: LEFT

## 2018-02-10 NOTE — PROGRESS NOTES
AC/aspirin),HLP presented to OSH yesterday evening with C/O right eye vision loss around 5 pm yesterday. Painless. - Had CT head without contrast at OSH ED that shows: No acute intracranial abnormality. 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 - EKG shows sinus bradycardia with PAC(R-58) - Concern for retinal artery occlusion?> 12 hours have passed. - Afib related? Carotid bruit? - BP was 210/85 initially and then came down to 160/69 -Normal CRP - No other acute neuro deficit seen. -Spoke to Optho on VUQN([id number])- alon Villalta and he will arrange for pt to be seen today at [facility]. No acute intervention needed as pt well out of time frame(as per optho). Plan: - Admit to [facility] consult. Await optho input and consider further rx options after etiology is confirmed. - Fall precautions - Neuro checks - ECHO - Carotid doppler  Will monitor.  Vitamin D deficiency      Past Surgical History:   Procedure Laterality Date    ROTATOR CUFF REPAIR Right     TONSILLECTOMY      TOTAL HIP ARTHROPLASTY Right 2009       Restrictions  Restrictions/Precautions  Restrictions/Precautions: (P) Fall Risk  Required Braces or Orthoses?: (P) Yes  Implants present? : (P) Metal implants  Upper Extremity Weight Bearing Restrictions  Right Upper Extremity Weight Bearing: Weight Bearing As Tolerated  Required Braces or Orthoses  Right Lower Extremity Brace: Ankle Foot Orthotics  Left Lower Extremity Brace: Loli Foot Orthotics  Right Upper Extremity Brace/Splint:  (cast, sling when OOB)  Position Activity Restriction  Other position/activity restrictions: Natasha  Subjective   General  Chart Reviewed: (P) Yes  Family / Caregiver Present: (P) No  Referring Practitioner: (P) Dr Last Screen  Subjective  Subjective: (P) Pt in bed this morning, agreeable to get up. Orientation     Objective   Bed mobility  Scooting: (P) Maximal assistance (partially d/t s/s of anxiety. supervision  Long term goal 2: (P) Stand with hemiwalker and SBA x2 min without LOB  Long term goal 3: (P) Perform expercises to support ambuation and transfers  Long term goal 4: (P) Esablish HEP  Patient Goals   Patient goals : (P) I want to walk again like I used to    Plan    Plan  Times per week: (P) 5-7x/week   Times per day:  (QD to BID)  Plan weeks: (P) 3 weeks  Current Treatment Recommendations: (P) Strengthening, Gait Training, Endurance Training, Transfer Training, Positioning, Pain Management  Safety Devices  Type of devices: (P) All fall risk precautions in place, Call light within reach  Restraints  Initially in place: (P) Yes     Therapy Time   Individual Concurrent Group Co-treatment   Time In  8: 40 am         Time Out  9: 10 am         Minutes  [phone] [facility], [facility]  License and [address] Number: [phone]

## 2018-02-11 PROCEDURE — 6370000000 HC RX 637 (ALT 250 FOR IP): Performed by: INTERNAL MEDICINE

## 2018-02-11 PROCEDURE — 1200000000 HC SEMI PRIVATE

## 2018-02-11 RX ADMIN — CYCLOBENZAPRINE HYDROCHLORIDE 5 MG: 10 TABLET, FILM COATED ORAL at 20:52

## 2018-02-11 RX ADMIN — TRAMADOL HYDROCHLORIDE 50 MG: 50 TABLET, COATED ORAL at 23:51

## 2018-02-11 RX ADMIN — CYCLOBENZAPRINE HYDROCHLORIDE 5 MG: 10 TABLET, FILM COATED ORAL at 14:21

## 2018-02-11 RX ADMIN — PANTOPRAZOLE SODIUM 40 MG: 40 TABLET, DELAYED RELEASE ORAL at 05:31

## 2018-02-11 RX ADMIN — DILTIAZEM HYDROCHLORIDE 60 MG: 30 TABLET, FILM COATED ORAL at 09:24

## 2018-02-11 RX ADMIN — DOCUSATE SODIUM 100 MG: 100 CAPSULE, LIQUID FILLED ORAL at 20:52

## 2018-02-11 RX ADMIN — DILTIAZEM HYDROCHLORIDE 60 MG: 30 TABLET, FILM COATED ORAL at 20:51

## 2018-02-11 RX ADMIN — ATORVASTATIN CALCIUM 40 MG: 40 TABLET, FILM COATED ORAL at 20:52

## 2018-02-11 RX ADMIN — DOCUSATE SODIUM 100 MG: 100 CAPSULE, LIQUID FILLED ORAL at 09:23

## 2018-02-11 RX ADMIN — SULFAMETHOXAZOLE AND TRIMETHOPRIM 0.5 TABLET: 800; 160 TABLET ORAL at 20:52

## 2018-02-11 RX ADMIN — TRAMADOL HYDROCHLORIDE 50 MG: 50 TABLET, COATED ORAL at 18:03

## 2018-02-11 RX ADMIN — TRAMADOL HYDROCHLORIDE 50 MG: 50 TABLET, COATED ORAL at 05:31

## 2018-02-11 RX ADMIN — CYCLOBENZAPRINE HYDROCHLORIDE 5 MG: 10 TABLET, FILM COATED ORAL at 09:24

## 2018-02-11 RX ADMIN — DILTIAZEM HYDROCHLORIDE 60 MG: 30 TABLET, FILM COATED ORAL at 14:22

## 2018-02-11 RX ADMIN — SULFAMETHOXAZOLE AND TRIMETHOPRIM 0.5 TABLET: 800; 160 TABLET ORAL at 09:24

## 2018-02-11 ASSESSMENT — PAIN SCALES - GENERAL
PAINLEVEL_OUTOF10: 5
PAINLEVEL_OUTOF10: 4
PAINLEVEL_OUTOF10: 5

## 2018-02-11 NOTE — PROGRESS NOTES
AC/aspirin),HLP presented to OSH yesterday evening with C/O right eye vision loss around 5 pm yesterday. Painless. - Had CT head without contrast at OSH ED that shows: No acute intracranial abnormality. 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 - EKG shows sinus bradycardia with PAC(R-58) - Concern for retinal artery occlusion?> 12 hours have passed. - Afib related? Carotid bruit? - BP was 210/85 initially and then came down to 160/69 -Normal CRP - No other acute neuro deficit seen. -Spoke to Optho on [facility]([id number])- alon Villalta and he will arrange for pt to be seen today at [facility]. No acute intervention needed as pt well out of time frame(as per optho). Plan: - Admit to [facility] consult. Await optho input and consider further rx options after etiology is confirmed. - Fall precautions - Neuro checks - ECHO - Carotid doppler  Will monitor.     Vitamin D deficiency      Past Surgical History:   Procedure Laterality Date    ROTATOR CUFF REPAIR Right     TONSILLECTOMY      TOTAL HIP ARTHROPLASTY Right 2009       Restrictions  Restrictions/Precautions  Restrictions/Precautions: (P) Fall Risk  Required Braces or Orthoses?: (P) Yes  Implants present? : (P) Metal implants  Upper Extremity Weight Bearing Restrictions  Right Upper Extremity Weight Bearing: (P) Weight Bearing As Tolerated  Required Braces or Orthoses  Right Lower Extremity Brace: (P) Ankle Foot Orthotics  Left Lower Extremity Brace: (P) Loli Foot Orthotics  Right Upper Extremity Brace/Splint: (P)  (cast, sling when OOB)  Position Activity Restriction  Other position/activity restrictions: (P) Kaur  Subjective   General  Chart Reviewed: (P) Yes  Family / Caregiver Present: (P) No  Referring Practitioner: (P) Dr Shaun Stewart  Subjective  Subjective: (P) Pt lying in bed upon arrival and \"state she is nauseated and not able to get up at this time\"  Refused therapy  Pain Screening  Patient Currently in Pain:

## 2018-02-12 LAB
ANION GAP SERPL CALCULATED.3IONS-SCNC: 14 MEQ/L (ref 7–13)
BASOPHILS ABSOLUTE: 0 K/UL (ref 0–0.2)
BASOPHILS RELATIVE PERCENT: 0.3 %
BUN BLDV-MCNC: 28 MG/DL (ref 8–23)
CALCIUM SERPL-MCNC: 9.5 MG/DL (ref 8.6–10.2)
CHLORIDE BLD-SCNC: 99 MEQ/L (ref 98–107)
CO2: 21 MEQ/L (ref 22–29)
CREAT SERPL-MCNC: 0.64 MG/DL (ref 0.5–0.9)
EOSINOPHILS ABSOLUTE: 0.4 K/UL (ref 0–0.7)
EOSINOPHILS RELATIVE PERCENT: 4.6 %
GFR AFRICAN AMERICAN: >60
GFR NON-AFRICAN AMERICAN: >60
GLUCOSE BLD-MCNC: 86 MG/DL (ref 74–109)
HCT VFR BLD CALC: 38.4 % (ref 37–47)
HEMOGLOBIN: 12.8 G/DL (ref 12–16)
LYMPHOCYTES ABSOLUTE: 1.3 K/UL (ref 1–4.8)
LYMPHOCYTES RELATIVE PERCENT: 13.8 %
MCH RBC QN AUTO: 31 PG (ref 27–31.3)
MCHC RBC AUTO-ENTMCNC: 33.2 % (ref 33–37)
MCV RBC AUTO: 93.3 FL (ref 82–100)
MONOCYTES ABSOLUTE: 0.8 K/UL (ref 0.2–0.8)
MONOCYTES RELATIVE PERCENT: 8.6 %
NEUTROPHILS ABSOLUTE: 6.6 K/UL (ref 1.4–6.5)
NEUTROPHILS RELATIVE PERCENT: 72.7 %
PDW BLD-RTO: 15.2 % (ref 11.5–14.5)
PLATELET # BLD: 338 K/UL (ref 130–400)
POTASSIUM SERPL-SCNC: 4.9 MEQ/L (ref 3.5–5.1)
RBC # BLD: 4.11 M/UL (ref 4.2–5.4)
SODIUM BLD-SCNC: 134 MEQ/L (ref 132–144)
WBC # BLD: 9.1 K/UL (ref 4.8–10.8)

## 2018-02-12 PROCEDURE — 97530 THERAPEUTIC ACTIVITIES: CPT

## 2018-02-12 PROCEDURE — 1200000000 HC SEMI PRIVATE

## 2018-02-12 PROCEDURE — 6370000000 HC RX 637 (ALT 250 FOR IP): Performed by: INTERNAL MEDICINE

## 2018-02-12 PROCEDURE — 80048 BASIC METABOLIC PNL TOTAL CA: CPT

## 2018-02-12 PROCEDURE — 85025 COMPLETE CBC W/AUTO DIFF WBC: CPT

## 2018-02-12 PROCEDURE — 97110 THERAPEUTIC EXERCISES: CPT

## 2018-02-12 PROCEDURE — 97535 SELF CARE MNGMENT TRAINING: CPT

## 2018-02-12 PROCEDURE — 36415 COLL VENOUS BLD VENIPUNCTURE: CPT

## 2018-02-12 RX ORDER — METOPROLOL SUCCINATE 25 MG/1
25 TABLET, EXTENDED RELEASE ORAL DAILY
Status: DISCONTINUED | OUTPATIENT
Start: 2018-02-12 | End: 2018-02-15 | Stop reason: HOSPADM

## 2018-02-12 RX ADMIN — RIVAROXABAN 15 MG: 15 TABLET, FILM COATED ORAL at 18:45

## 2018-02-12 RX ADMIN — ATORVASTATIN CALCIUM 40 MG: 40 TABLET, FILM COATED ORAL at 22:10

## 2018-02-12 RX ADMIN — METOPROLOL SUCCINATE 25 MG: 25 TABLET, FILM COATED, EXTENDED RELEASE ORAL at 10:59

## 2018-02-12 RX ADMIN — SULFAMETHOXAZOLE AND TRIMETHOPRIM 0.5 TABLET: 800; 160 TABLET ORAL at 10:56

## 2018-02-12 RX ADMIN — DOCUSATE SODIUM 100 MG: 100 CAPSULE, LIQUID FILLED ORAL at 22:11

## 2018-02-12 RX ADMIN — PANTOPRAZOLE SODIUM 40 MG: 40 TABLET, DELAYED RELEASE ORAL at 05:58

## 2018-02-12 RX ADMIN — CYCLOBENZAPRINE HYDROCHLORIDE 5 MG: 10 TABLET, FILM COATED ORAL at 14:17

## 2018-02-12 RX ADMIN — TRAMADOL HYDROCHLORIDE 50 MG: 50 TABLET, COATED ORAL at 05:58

## 2018-02-12 RX ADMIN — TRAMADOL HYDROCHLORIDE 50 MG: 50 TABLET, COATED ORAL at 18:43

## 2018-02-12 RX ADMIN — DILTIAZEM HYDROCHLORIDE 60 MG: 30 TABLET, FILM COATED ORAL at 22:10

## 2018-02-12 RX ADMIN — SULFAMETHOXAZOLE AND TRIMETHOPRIM 0.5 TABLET: 800; 160 TABLET ORAL at 22:10

## 2018-02-12 RX ADMIN — DILTIAZEM HYDROCHLORIDE 60 MG: 30 TABLET, FILM COATED ORAL at 10:56

## 2018-02-12 RX ADMIN — CYCLOBENZAPRINE HYDROCHLORIDE 5 MG: 10 TABLET, FILM COATED ORAL at 22:11

## 2018-02-12 RX ADMIN — DOCUSATE SODIUM 100 MG: 100 CAPSULE, LIQUID FILLED ORAL at 10:56

## 2018-02-12 RX ADMIN — TRAMADOL HYDROCHLORIDE 50 MG: 50 TABLET, COATED ORAL at 14:17

## 2018-02-12 RX ADMIN — CYCLOBENZAPRINE HYDROCHLORIDE 5 MG: 10 TABLET, FILM COATED ORAL at 10:57

## 2018-02-12 ASSESSMENT — PAIN DESCRIPTION - PAIN TYPE: TYPE: CHRONIC PAIN

## 2018-02-12 ASSESSMENT — PAIN SCALES - GENERAL
PAINLEVEL_OUTOF10: 5
PAINLEVEL_OUTOF10: 6

## 2018-02-12 ASSESSMENT — PAIN DESCRIPTION - LOCATION
LOCATION: SHOULDER
LOCATION: SHOULDER;ARM

## 2018-02-12 ASSESSMENT — PAIN DESCRIPTION - ORIENTATION
ORIENTATION: LEFT;RIGHT
ORIENTATION: RIGHT

## 2018-02-12 NOTE — PROGRESS NOTES
Physical Therapy  Facility/Department: [facility] MED SURG UNIT  Daily Treatment Note  NAME: Cristine Marshall  : 10/9/1926  MRN: [id number]    Date of Service: 2018    Patient Diagnosis(es):   Patient Active Problem List    Diagnosis Date Noted    Closed right radial fracture, closed, initial encounter 2018    Radial head fracture, closed, right, closed, initial encounter 2018    Radial head fracture, closed, left, closed, initial encounter 2018    Cellulitis of right leg 2018    Cellulitis and abscess of right lower extremity 2018    Bilateral carotid artery stenosis 10/27/2017    Central retinal artery occlusion, right eye 10/17/2017    Vision loss of right eye 10/17/2017    Status post total replacement of right hip 2017    Varicose veins of both lower extremities 2017    Bruit of left carotid artery 2017    Atrial fibrillation ([address] 2014    HTN (hypertension) 2013    Peripheral edema 2013    Vitamin D deficiency     Dyslipidemia     Osteoporosis     Chronic constipation     Neurodermatitis     Chronic venous insufficiency     Anxiety     Seronegative rheumatoid arthritis ([address]        Past Medical History:   Diagnosis Date    Anxiety     Atrial fibrillation ([address]     Bilateral carotid artery stenosis 10/27/2017    Central retinal artery occlusion, right eye 10/17/2017    Chronic constipation     Chronic venous insufficiency     Dyslipidemia     Essential hypertension     HTN (hypertension) 2013    Neurodermatitis     chronic, of bilateral lower extremities    Osteoporosis     Pap test, as part of routine gynecological examination     years ago   [facility] Screening mammogram     about 5 yrs ago    Seronegative rheumatoid arthritis ([address]     Status post total replacement of right hip 2017    Vision loss of right eye 10/17/2017    Overview:  - 80year old female who presents with PMH of HTN,AFIB(declined Shoulder;Arm  Pain Orientation: Right (R forearm, L shoulder)  Vital Signs  Patient Currently in Pain: Yes       Orientation     Objective      Transfers  Sit to Stand: Contact guard assistance (CGAx2 in lilly steady)  Stand to sit: Contact guard assistance (CGAx2 in lilly steady)  Bed to Chair: Contact guard assistance (CGA x 2 in lilly steady)  Comment: co-treat with OT for safety with transfers           Exercises  Hip Flexion: x 20 alternating  Knee Long Arc Quad: x 20   Ankle Pumps: 20x  Other exercises  Other exercises?: Yes  Other exercises 1: pillow sq 20x                        Assessment   Body structures, Functions, Activity limitations: Decreased functional mobility ; Decreased strength;Decreased endurance  Assessment:Performed cotreat with OT for inc safety with transfers. Pt performed transfers with inc indpenedence this date. Pt performed seated LE ther ex with no c/o inc pain, just fatigue and SOB requring frequent RB's. Pt left to sit up in chair with call light and chair alarm in place. Continue to progress as анна. Treatment Diagnosis: Decreased fucntional mobility  REQUIRES PT FOLLOW UP: Yes  Activity Tolerance  Activity Tolerance: Patient limited by pain; Other  PT Equipment Recommendations  Other: may need wide WILFRIDO hemiwalker one handle use for LUE support due to previous use of ww for mobility and likely NWB RUE with new fracture       Discharge Recommendations:  Subacute/Skilled Nursing Facility    G-Code     OutComes Score                                                    AM-PAC Score             Goals  Short term goals  Time Frame for Short term goals: 3-5 days  Short term goal 1: sit to stand with <= Min A x1   Short term goal 2: Perform stand pivot transfers with  mod assit x1  Short term goal 3: tolerate 2x10 reps LE LUDIN to increase strength for functional mobility  Long term goals  Time Frame for Long term goals : 2-3 weeks  Long term goal 1: Perform stand pivot transfwers with supervision  Long term goal 2: Stand with hemiwalker and SBA x2 min without LOB  Long term goal 3: Perform expercises to support ambuation and transfers  Long term goal 4: Esablish HEP  Patient Goals   Patient goals : I want to walk again like I used to    Plan    Plan  Times per week: 5-7x/week   Times per day:  (QD to BID)  Plan weeks: 3 weeks  Current Treatment Recommendations: Strengthening, Gait Training, Endurance Training, Transfer Training, Positioning, Pain Management  Safety Devices  Type of devices:  All fall risk precautions in place, Bed alarm in place, Call light within reach, Gait belt, Left in chair  Restraints  Initially in place: Yes     Therapy Time   Individual Concurrent Group Co-treatment   Time In  925         Time Out  1000         Minutes  35      20 min cotreat with [address], [facility]  License and [address] Number: [id number]

## 2018-02-12 NOTE — PROGRESS NOTES
Occupational Therapy  Facility/Department: [facility] MED SURG UNIT  Daily Treatment Note  NAME: Zheng Dale  : 10/9/1926  MRN: [id number]    Date of Service: 2018    Patient Diagnosis(es): There were no encounter diagnoses. has a past medical history of Anxiety; Atrial fibrillation ([facility] [address] Bilateral carotid artery stenosis; Central retinal artery occlusion, right eye; Chronic constipation; Chronic venous insufficiency; Dyslipidemia; Essential hypertension; HTN (hypertension); Neurodermatitis; Osteoporosis; Pap test, as part of routine gynecological examination; Screening mammogram; Seronegative rheumatoid arthritis ([facility] [address] Status post total replacement of right hip; Vision loss of right eye; and Vitamin D deficiency. has a past surgical history that includes Total hip arthroplasty (Right, ); Rotator cuff repair (Right); and Tonsillectomy. Restrictions  Restrictions/Precautions  Restrictions/Precautions: Fall Risk  Required Braces or Orthoses?: Yes (RUE cast)  Implants present? : Metal implants  Upper Extremity Weight Bearing Restrictions  Right Upper Extremity Weight Bearing: Weight Bearing As Tolerated  Required Braces or Orthoses  Right Lower Extremity Brace: Ankle Foot Orthotics  Left Lower Extremity Brace: Loli Foot Orthotics  Right Upper Extremity Brace/Splint:  (cast, sling when OOB)  Position Activity Restriction  Other position/activity restrictions: Natasha  Subjective   General  Chart Reviewed: Yes  Patient assessed for rehabilitation services?: Yes  Response to previous treatment: Patient reporting fatigue but able to participate  Family / Caregiver Present: Yes  Diagnosis: FALL/ CHI/ COLLES FX RIGHT WRIST/ RIGHT HIP CONTUSION.   Subjective  Subjective: Goals adjusted on this progress note to more appropriate level  Pain Assessment  Patient Currently in Pain: Yes  Pain Assessment: 0-10  Pain Level: 6  Pain Location: Shoulder  Pain Orientation: Left;Right (R forearm, L shoulder)  Vital Signs  Patient Currently in Pain: Yes   Orientation     Objective    ADL  UE Dressing: Moderate assistance  LE Dressing: Maximum assistance (socks, shoes, AFO LLE)        Standing Balance  Time: 3min in Abhilash Jacobson  Sit to stand: Contact guard assistance (x2 person for safety)  Stand to sit: Contact guard assistance (x2 person for safety with Abhilash Jacobson)  Bed mobility  Rolling to Left: Stand by assistance (cues for sequencing, use of bed rail)  Supine to Sit: Stand by assistance (use of bed rail, extra time)  Scooting: Contact guard assistance  Transfers  Sit to stand: Contact guard assistance (x2 person for safety)  Stand to sit: Contact guard assistance (x2 person for safety with Abhilash Jacobson)  Transfer Comments: CGA x2 Wilinderjit Smallssmith xfer bed -> room chair                                            Exercises  Shoulder Flexion: 2x10 reps BUE (within pt's comfort level)  Elbow Flexion: 2x15 reps BUE  Elbow Extension: 2x10 reps BUE  Finger Extension: 2x20 sec flex/ext BUE (aside from R hand thumb)  Other: to increase strength/endurance for ADL (rest break between each set)                    Assessment   Performance deficits / Impairments: Decreased functional mobility ; Decreased safe awareness;Decreased balance;Decreased coordination;Decreased ADL status; Decreased vision/visual deficit; Decreased high-level IADLs;Decreased endurance;Decreased ROM; Decreased strength;Decreased fine motor control  Prognosis: Fair;Good  Discharge Recommendations: Subacute/Skilled Nursing Facility;24 hour supervision or assist  REQUIRES OT FOLLOW UP: Yes       Discharge Recommendations:  [address], 24 hour supervision or assist     Plan   Plan  Times per week: 3-6 (updated 3-6x)  Times per day: Daily  Plan weeks: 2  Current Treatment Recommendations: Strengthening, Endurance Training, Neuromuscular Re-education, Patient/Caregiver Education & Training, ROM, Equipment Evaluation, Education, & procurement, Cognitive

## 2018-02-12 NOTE — PROGRESS NOTES
Hospitalist Progress Note      PCP: Claudetta Gasmen, CNP    Date of Admission: 2/2/2018    Chief Complaint: weakness    Subjective: pt in bed, eating breakfast. Looks comfortable     Medications:  Reviewed    Infusion Medications    Scheduled Medications    rivaroxaban  15 mg Oral Daily    sulfamethoxazole-trimethoprim  0.5 tablet Oral 2 times per day    sodium chloride flush  10 mL Intravenous 2 times per day    atorvastatin  40 mg Oral Daily    cyclobenzaprine  5 mg Oral TID    diltiazem  60 mg Oral TID    docusate sodium  100 mg Oral BID    lidocaine  1 patch Transdermal Daily    pantoprazole  40 mg Oral QAM AC    traMADol  50 mg Oral 4 times per day     PRN Meds: acetaminophen, magnesium hydroxide, ondansetron, oxyCODONE **OR** oxyCODONE, sodium chloride flush      Intake/Output Summary (Last 24 hours) at 02/12/18 0906  Last data filed at 02/12/18 0728   Gross per 24 hour   Intake             1080 ml   Output             1250 ml   Net             -170 ml       Exam:    BP (!) 172/63   Pulse 73   Temp 97.5 °F (36.4 °C) (Oral)   Resp 16   Ht 4' 10\" (1.473 m)   Wt 112 lb 7 oz (51 kg)   SpO2 98%   BMI 23.50 kg/m²     General appearance: No apparent distress, appears stated age and cooperative. HEENT: Pupils equal, round, and reactive to light. Conjunctivae/corneas clear. Neck: Supple, with full range of motion. No jugular venous distention. Trachea midline. Respiratory:  Normal respiratory effort. Clear to auscultation, bilaterally without Rales/Wheezes/Rhonchi. Cardiovascular: Regular rate and rhythm with normal S1/S2 without murmurs, rubs or gallops. Abdomen: Soft, non-tender, non-distended with normal bowel sounds. Musculoskeletal:R arm splint. Skin: Skin color, texture, turgor normal.  No rashes or lesions. Neurologic:  Neurovascularly intact without any focal sensory/motor deficits.  Cranial nerves: II-XII intact, grossly non-focal.  Psychiatric: Alert and oriented, thought content

## 2018-02-12 NOTE — PROGRESS NOTES
Physical Therapy  Facility/Department: [facility] MED SURG UNIT  Daily Treatment Note  NAME: José Miguel Mcdonnell  : 10/9/1926  MRN: [id number]    Date of Service: 2018    Patient Diagnosis(es):   Patient Active Problem List    Diagnosis Date Noted    Closed right radial fracture, closed, initial encounter 2018    Radial head fracture, closed, right, closed, initial encounter 2018    Radial head fracture, closed, left, closed, initial encounter 2018    Cellulitis of right leg 2018    Cellulitis and abscess of right lower extremity 2018    Bilateral carotid artery stenosis 10/27/2017    Central retinal artery occlusion, right eye 10/17/2017    Vision loss of right eye 10/17/2017    Status post total replacement of right hip 2017    Varicose veins of both lower extremities 2017    Bruit of left carotid artery 2017    Atrial fibrillation ([address] 2014    HTN (hypertension) 2013    Peripheral edema 2013    Vitamin D deficiency     Dyslipidemia     Osteoporosis     Chronic constipation     Neurodermatitis     Chronic venous insufficiency     Anxiety     Seronegative rheumatoid arthritis ([address]        Past Medical History:   Diagnosis Date    Anxiety     Atrial fibrillation ([address]     Bilateral carotid artery stenosis 10/27/2017    Central retinal artery occlusion, right eye 10/17/2017    Chronic constipation     Chronic venous insufficiency     Dyslipidemia     Essential hypertension     HTN (hypertension) 2013    Neurodermatitis     chronic, of bilateral lower extremities    Osteoporosis     Pap test, as part of routine gynecological examination     years ago   [employer] Screening mammogram     about 5 yrs ago    Seronegative rheumatoid arthritis ([address]     Status post total replacement of right hip 2017    Vision loss of right eye 10/17/2017    Overview:  - 80year old female who presents with PMH of HTN,AFIB(declined AC/aspirin),HLP presented to OSH yesterday evening with C/O right eye vision loss around 5 pm yesterday. Painless. - Had CT head without contrast at OSH ED that shows: No acute intracranial abnormality. 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 - EKG shows sinus bradycardia with PAC(R-58) - Concern for retinal artery occlusion?> 12 hours have passed. - Afib related? Carotid bruit? - BP was 210/85 initially and then came down to 160/69 -Normal CRP - No other acute neuro deficit seen. -Spoke to Optho on [place]([id number])- alon Villalta and he will arrange for pt to be seen today at [facility]. No acute intervention needed as pt well out of time frame(as per optho). Plan: - Admit to [facility] consult. Await optho input and consider further rx options after etiology is confirmed. - Fall precautions - Neuro checks - ECHO - Carotid doppler  Will monitor.  Vitamin D deficiency      Past Surgical History:   Procedure Laterality Date    ROTATOR CUFF REPAIR Right     TONSILLECTOMY      TOTAL HIP ARTHROPLASTY Right 2009       Restrictions  Restrictions/Precautions  Restrictions/Precautions: Fall Risk  Required Braces or Orthoses?: Yes (RUE cast)  Implants present? : Metal implants  Upper Extremity Weight Bearing Restrictions  Right Upper Extremity Weight Bearing: Weight Bearing As Tolerated  Required Braces or Orthoses  Right Lower Extremity Brace: Ankle Foot Orthotics  Left Lower Extremity Brace: Loli Foot Orthotics  Right Upper Extremity Brace/Splint:  (cast, sling when OOB)  Position Activity Restriction  Other position/activity restrictions: Kaur  Subjective   General  Chart Reviewed: Yes  Family / Caregiver Present: No  Referring Practitioner: Dr Justin Flanagan: Pt sitting up in chair upon arrival and states she would like to go back to bed.   Pain Screening  Patient Currently in Pain: Yes  Vital Signs  Patient Currently in Pain: Yes       Orientation

## 2018-02-13 LAB
BACTERIA: ABNORMAL /HPF
BILIRUBIN URINE: NEGATIVE
BLOOD, URINE: ABNORMAL
CLARITY: ABNORMAL
COLOR: YELLOW
EPITHELIAL CELLS, UA: ABNORMAL /HPF
GLUCOSE URINE: NEGATIVE MG/DL
KETONES, URINE: NEGATIVE MG/DL
LEUKOCYTE ESTERASE, URINE: ABNORMAL
NITRITE, URINE: POSITIVE
PH UA: >=9 (ref 5–9)
PROTEIN UA: 30 MG/DL
RBC UA: ABNORMAL /HPF (ref 0–2)
SPECIFIC GRAVITY UA: 1.01 (ref 1–1.03)
UROBILINOGEN, URINE: 1 E.U./DL
WBC UA: ABNORMAL /HPF (ref 0–5)

## 2018-02-13 PROCEDURE — 87086 URINE CULTURE/COLONY COUNT: CPT

## 2018-02-13 PROCEDURE — 87077 CULTURE AEROBIC IDENTIFY: CPT

## 2018-02-13 PROCEDURE — 6370000000 HC RX 637 (ALT 250 FOR IP): Performed by: INTERNAL MEDICINE

## 2018-02-13 PROCEDURE — 97110 THERAPEUTIC EXERCISES: CPT

## 2018-02-13 PROCEDURE — 81001 URINALYSIS AUTO W/SCOPE: CPT

## 2018-02-13 PROCEDURE — 2580000003 HC RX 258: Performed by: INTERNAL MEDICINE

## 2018-02-13 PROCEDURE — G8996 SWALLOW CURRENT STATUS: HCPCS

## 2018-02-13 PROCEDURE — 92610 EVALUATE SWALLOWING FUNCTION: CPT

## 2018-02-13 PROCEDURE — 97530 THERAPEUTIC ACTIVITIES: CPT

## 2018-02-13 PROCEDURE — 87186 SC STD MICRODIL/AGAR DIL: CPT

## 2018-02-13 PROCEDURE — G8998 SWALLOW D/C STATUS: HCPCS

## 2018-02-13 PROCEDURE — 97535 SELF CARE MNGMENT TRAINING: CPT

## 2018-02-13 PROCEDURE — 1200000000 HC SEMI PRIVATE

## 2018-02-13 RX ORDER — CIPROFLOXACIN 500 MG/1
250 TABLET, FILM COATED ORAL EVERY 12 HOURS SCHEDULED
Status: DISCONTINUED | OUTPATIENT
Start: 2018-02-13 | End: 2018-02-15 | Stop reason: HOSPADM

## 2018-02-13 RX ADMIN — TRAMADOL HYDROCHLORIDE 50 MG: 50 TABLET, COATED ORAL at 05:58

## 2018-02-13 RX ADMIN — CIPROFLOXACIN HYDROCHLORIDE 250 MG: 500 TABLET, FILM COATED ORAL at 20:28

## 2018-02-13 RX ADMIN — DOCUSATE SODIUM 100 MG: 100 CAPSULE, LIQUID FILLED ORAL at 11:39

## 2018-02-13 RX ADMIN — RIVAROXABAN 15 MG: 15 TABLET, FILM COATED ORAL at 17:36

## 2018-02-13 RX ADMIN — TRAMADOL HYDROCHLORIDE 50 MG: 50 TABLET, COATED ORAL at 17:36

## 2018-02-13 RX ADMIN — METOPROLOL SUCCINATE 25 MG: 25 TABLET, FILM COATED, EXTENDED RELEASE ORAL at 11:39

## 2018-02-13 RX ADMIN — CYCLOBENZAPRINE HYDROCHLORIDE 5 MG: 10 TABLET, FILM COATED ORAL at 14:32

## 2018-02-13 RX ADMIN — CYCLOBENZAPRINE HYDROCHLORIDE 5 MG: 10 TABLET, FILM COATED ORAL at 20:24

## 2018-02-13 RX ADMIN — Medication 10 ML: at 23:14

## 2018-02-13 RX ADMIN — CIPROFLOXACIN HYDROCHLORIDE 250 MG: 500 TABLET, FILM COATED ORAL at 11:47

## 2018-02-13 RX ADMIN — CYCLOBENZAPRINE HYDROCHLORIDE 5 MG: 10 TABLET, FILM COATED ORAL at 11:38

## 2018-02-13 RX ADMIN — TRAMADOL HYDROCHLORIDE 50 MG: 50 TABLET, COATED ORAL at 11:46

## 2018-02-13 RX ADMIN — PANTOPRAZOLE SODIUM 40 MG: 40 TABLET, DELAYED RELEASE ORAL at 05:58

## 2018-02-13 RX ADMIN — DILTIAZEM HYDROCHLORIDE 60 MG: 30 TABLET, FILM COATED ORAL at 11:41

## 2018-02-13 RX ADMIN — ATORVASTATIN CALCIUM 40 MG: 40 TABLET, FILM COATED ORAL at 20:27

## 2018-02-13 RX ADMIN — DOCUSATE SODIUM 100 MG: 100 CAPSULE, LIQUID FILLED ORAL at 20:28

## 2018-02-13 RX ADMIN — DILTIAZEM HYDROCHLORIDE 60 MG: 30 TABLET, FILM COATED ORAL at 20:24

## 2018-02-13 RX ADMIN — TRAMADOL HYDROCHLORIDE 50 MG: 50 TABLET, COATED ORAL at 00:31

## 2018-02-13 ASSESSMENT — PAIN SCALES - GENERAL
PAINLEVEL_OUTOF10: 1
PAINLEVEL_OUTOF10: 4
PAINLEVEL_OUTOF10: 4
PAINLEVEL_OUTOF10: 5
PAINLEVEL_OUTOF10: 5

## 2018-02-13 ASSESSMENT — PAIN DESCRIPTION - ONSET
ONSET: ON-GOING

## 2018-02-13 ASSESSMENT — PAIN DESCRIPTION - DESCRIPTORS
DESCRIPTORS: ACHING;CONSTANT

## 2018-02-13 ASSESSMENT — PAIN DESCRIPTION - PROGRESSION
CLINICAL_PROGRESSION: NOT CHANGED
CLINICAL_PROGRESSION: GRADUALLY IMPROVING
CLINICAL_PROGRESSION: NOT CHANGED

## 2018-02-13 ASSESSMENT — PAIN DESCRIPTION - ORIENTATION
ORIENTATION: LEFT

## 2018-02-13 ASSESSMENT — PAIN DESCRIPTION - PAIN TYPE
TYPE: CHRONIC PAIN

## 2018-02-13 ASSESSMENT — PAIN DESCRIPTION - LOCATION
LOCATION: SHOULDER

## 2018-02-13 ASSESSMENT — PAIN DESCRIPTION - FREQUENCY
FREQUENCY: CONTINUOUS

## 2018-02-13 NOTE — PROGRESS NOTES
Occupational Therapy  Facility/Department: [facility] MED SURG UNIT  Daily Treatment Note  NAME: Bibi Romero  : 10/9/1926  MRN: [id number]    Date of Service: 2018    Patient Diagnosis(es): There were no encounter diagnoses. has a past medical history of Anxiety; Atrial fibrillation ([facility] [address] Bilateral carotid artery stenosis; Central retinal artery occlusion, right eye; Chronic constipation; Chronic venous insufficiency; Dyslipidemia; Essential hypertension; HTN (hypertension); Neurodermatitis; Osteoporosis; Pap test, as part of routine gynecological examination; Screening mammogram; Seronegative rheumatoid arthritis ([facility] [address] Status post total replacement of right hip; Vision loss of right eye; and Vitamin D deficiency. has a past surgical history that includes Total hip arthroplasty (Right, ); Rotator cuff repair (Right); and Tonsillectomy. Restrictions  Restrictions/Precautions  Restrictions/Precautions: Fall Risk  Required Braces or Orthoses?: Yes (RUE cast)  Implants present? : Metal implants  Upper Extremity Weight Bearing Restrictions  Right Upper Extremity Weight Bearing: Weight Bearing As Tolerated  Required Braces or Orthoses  Right Lower Extremity Brace: Ankle Foot Orthotics  Left Lower Extremity Brace: Loli Foot Orthotics  Right Upper Extremity Brace/Splint:  (cast, sling when OOB)  Position Activity Restriction  Other position/activity restrictions: Natasha  Subjective   General  Chart Reviewed: Yes  Patient assessed for rehabilitation services?: Yes  Response to previous treatment: Patient reporting fatigue but able to participate  Family / Caregiver Present: Yes  Diagnosis: FALL/ CHI/ COLLES FX RIGHT WRIST/ RIGHT HIP CONTUSION.   Subjective  Subjective: Goals adjusted on this progress note to more appropriate level      Orientation     Objective    ADL  LE Bathing: Maximum assistance lower body and backside  LE Dressing: Maximum assistance (socks, shoes, AFO LLE)  Toileting: Dependent/Total (with extra time for task, max cues for safety/sequencing)        Standing Balance  Time: 3min in [facility] transfer:  Sit to stand: Contact guard assistance (x2 person for safety)  Stand to sit: Contact guard assistance (x2 person for safety with Warden Dinh     Transfers  Sit to stand: Contact guard assistance (x2 person for safety)  Stand to sit: Contact guard assistance (x2 person for safety with Warden Dinh)        Coordination  Fine Motor:  (with  LUE in sitting position -Min A)         Hand and arm squeezes with LUE with therapy balls 20x  AROM of LUE in all planes and directions 20x 6 sets  To increase and maintain UB strength                                               Assessment    OT follow up: yes  Pt.'s progress has not changed much          Discharge Recommendations:  [address], 24 hour supervision or assist     Plan   Plan  Times per week: 3-6 (updated 3-6x)  Times per day: Daily  Plan weeks: 2  Current Treatment Recommendations: Strengthening, Endurance Training, Neuromuscular Re-education, Patient/Caregiver Education & Training, ROM, Equipment Evaluation, Education, & procurement, Cognitive Reorientation, Self-Care / ADL, Home Management Training, Pain Management, Balance Training, Functional Mobility Training, Safety Education & Training  G-Code     OutComes Score                                           AM-PAC Score             Goals  Short term goals  Time Frame for Short term goals: 1 WEEK   Short term goal 1: Tolerate BUE ther ex/act [id number] to increase safety and I with ADL  Short term goal 2: Increase to maxA toileting tasks   Short term goal 3: PT  IND APPLY 2 SAFETY COMPENSATIONS FOR FALL PREVENTION TO DECRESE RISK FOR FALLS. Short term goal 4: Tolerate 1-2min standing endurance with modA to alleviate caregiver burden during ADL  Short term goal 5:  Increase to modA UB dressing tasks  Long term goals  Time Frame for Long term goals : 2 WEEKS  Long term goal 1: Increase to Christi UB dressing tasks  Long term goal 2: Increase to CGA/Christi bed mob supine -> sit and modA sit -> supine using bed rails  Long term goal 3: Increase to Christi UB dressing tasks  Long term goal 4: Tolerate 2-4min standing endurance with Christi to alleviate caregiver burden during ADL  Patient Goals   Patient goals : RETURN HOME AT [facility].        Therapy Time   Individual Concurrent Group Co-treatment   Time In  12:40pm         Time Out  1:30pm         Minutes  [address], [place]  License and Documentation [employer]  Therapy License Number: [id number]

## 2018-02-13 NOTE — PROGRESS NOTES
Speech Language Pathology  Facility/Department: [facility] MED SURG UNIT   BEDSIDE SWALLOW EVALUATION    NAME: Eliezer Courtney  : 10/9/1926  MRN: [id number]    ADMISSION DATE: 2018  ADMITTING DIAGNOSIS: has Vitamin D deficiency; Dyslipidemia; Osteoporosis; Chronic constipation; Neurodermatitis; Chronic venous insufficiency; Anxiety; Seronegative rheumatoid arthritis ([facility] [address] HTN (hypertension); Peripheral edema; Atrial fibrillation ([facility] [address] Status post total replacement of right hip; Varicose veins of both lower extremities; Bruit of left carotid artery; Central retinal artery occlusion, right eye; Vision loss of right eye; Bilateral carotid artery stenosis; Cellulitis and abscess of right lower extremity; Cellulitis of right leg; Radial head fracture, closed, left, closed, initial encounter; Closed right radial fracture, closed, initial encounter; and Radial head fracture, closed, right, closed, initial encounter on her problem list.  ONSET DATE:     Recent Chest Xray/CT of Chest: ( Date  )    Date of Eval: 2018  Evaluating Therapist: Kari Mac    Current Diet level:   dental soft      Primary Complaint   pt.  Complains of food getting \"stuck\" in esophagus    Pain:  Pain Assessment  Patient Currently in Pain: Yes  Pain Assessment: 0-10  Medley-Baker Pain Rating: Hurts little more  Pain Level: 4  Pain Type: Chronic pain  Pain Location: Shoulder  Pain Orientation: Left  Pain Descriptors: Aching, Constant  Pain Frequency: Continuous  Clinical Progression: Not changed  Patient's Stated Pain Goal: 1  Pain Intervention(s): Medication (see eMar)  Response to Pain Intervention: Asleep with RR greater than 10  Multiple Pain Sites: No  Pain 2  Pain Rating 2: 0  Pain Type 2: Acute Pain  Pain Location 2: Elbow    Reason for Referral  Eliezer Courtney was referred for a bedside swallow evaluation to assess the efficiency of her swallow function, identify signs and symptoms of aspiration and make recommendations

## 2018-02-13 NOTE — PROGRESS NOTES
Physical Therapy  Facility/Department: [facility] MED SURG UNIT  Daily Treatment Note  NAME: Eliezer Corutney  : 10/9/1926  MRN: [id number]    Date of Service: 2018    Patient Diagnosis(es):   Patient Active Problem List    Diagnosis Date Noted    Closed right radial fracture, closed, initial encounter 2018    Radial head fracture, closed, right, closed, initial encounter 2018    Radial head fracture, closed, left, closed, initial encounter 2018    Cellulitis of right leg 2018    Cellulitis and abscess of right lower extremity 2018    Bilateral carotid artery stenosis 10/27/2017    Central retinal artery occlusion, right eye 10/17/2017    Vision loss of right eye 10/17/2017    Status post total replacement of right hip 2017    Varicose veins of both lower extremities 2017    Bruit of left carotid artery 2017    Atrial fibrillation ([address] 2014    HTN (hypertension) 2013    Peripheral edema 2013    Vitamin D deficiency     Dyslipidemia     Osteoporosis     Chronic constipation     Neurodermatitis     Chronic venous insufficiency     Anxiety     Seronegative rheumatoid arthritis ([address]        Past Medical History:   Diagnosis Date    Anxiety     Atrial fibrillation ([address]     Bilateral carotid artery stenosis 10/27/2017    Central retinal artery occlusion, right eye 10/17/2017    Chronic constipation     Chronic venous insufficiency     Dyslipidemia     Essential hypertension     HTN (hypertension) 2013    Neurodermatitis     chronic, of bilateral lower extremities    Osteoporosis     Pap test, as part of routine gynecological examination     years ago   [facility] Screening mammogram     about 5 yrs ago    Seronegative rheumatoid arthritis ([address]     Status post total replacement of right hip 2017    Vision loss of right eye 10/17/2017    Overview:  - 80year old female who presents with PMH of HTN,AFIB(declined Transfers  Sit to Stand: Minimal Assistance (minAx1 in [address] steady)  Stand to sit: Minimal Assistance (minAx1 in [address] steady)  Bed to Chair: Minimal assistance (minAx1 w/ use of lilly steady)        Balance  Standing - Static: Fair;-  Standing - Dynamic: Fair;-  Exercises    Hip Flexion: x 20 alternating  Hip Abduction: x 20   Knee Long Arc Quad: x 20   Ankle Pumps: 20x  Other exercises  Other exercises?: Yes  Other exercises 1: pillow sq 20x                        Assessment   Body structures, Functions, Activity limitations: Decreased functional mobility ; Decreased strength;Decreased endurance  Assessment: Pt displays increased safety w/ transfers using lilly steady when pt pulls to stand and feels more in control. Pt also tolerated  LE ther ex. Pt left sitting up in the chair with Call light and alarm in place. Continue to progress as tolerated. Treatment Diagnosis: Decreased fucntional mobility  REQUIRES PT FOLLOW UP: Yes  Activity Tolerance  Activity Tolerance: Patient limited by pain; Other  Activity Tolerance: Fear of falling preventing pt from progressing  PT Equipment Recommendations  Other: may need wide WILFRIDO hemiwalker one handle use for LUE support due to previous use of ww for mobility and likely NWB RUE with new fracture       Discharge Recommendations:  Subacute/Skilled Nursing Facility    G-CGoals  Short term goals  Time Frame for Short term goals: 3-5 days  Short term goal 1: sit to stand with <= Min A x1   Short term goal 2: Perform stand pivot transfers with  mod assit x1  Short term goal 3: tolerate 2x10 reps LE LUDIN to increase strength for functional mobility  Long term goals  Time Frame for Long term goals : 2-3 weeks  Long term goal 1: Perform stand pivot transfwers with supervision  Long term goal 2: Stand with hemiwalker and SBA x2 min without LOB  Long term goal 3: Perform expercises to support ambuation and transfers  Long term goal 4: Esablish HEP  Patient Goals   Patient goals : I want

## 2018-02-14 PROCEDURE — 97110 THERAPEUTIC EXERCISES: CPT

## 2018-02-14 PROCEDURE — 97112 NEUROMUSCULAR REEDUCATION: CPT

## 2018-02-14 PROCEDURE — 97530 THERAPEUTIC ACTIVITIES: CPT

## 2018-02-14 PROCEDURE — 1200000000 HC SEMI PRIVATE

## 2018-02-14 PROCEDURE — 6370000000 HC RX 637 (ALT 250 FOR IP): Performed by: INTERNAL MEDICINE

## 2018-02-14 PROCEDURE — 97535 SELF CARE MNGMENT TRAINING: CPT

## 2018-02-14 RX ORDER — CIPROFLOXACIN 500 MG/1
500 TABLET, FILM COATED ORAL EVERY 12 HOURS SCHEDULED
Qty: 10 TABLET | Refills: 0 | Status: SHIPPED | OUTPATIENT
Start: 2018-02-14 | End: 2018-02-19

## 2018-02-14 RX ORDER — CYCLOBENZAPRINE HCL 5 MG
5 TABLET ORAL 3 TIMES DAILY
Qty: 30 TABLET | Refills: 0 | Status: SHIPPED | OUTPATIENT
Start: 2018-02-14 | End: 2018-02-24

## 2018-02-14 RX ORDER — MEGESTROL ACETATE 20 MG/1
20 TABLET ORAL DAILY
Qty: 30 TABLET | Refills: 0 | Status: SHIPPED | OUTPATIENT
Start: 2018-02-15 | End: 2018-03-12 | Stop reason: ALTCHOICE

## 2018-02-14 RX ORDER — MEGESTROL ACETATE 20 MG/1
20 TABLET ORAL DAILY
Status: DISCONTINUED | OUTPATIENT
Start: 2018-02-14 | End: 2018-02-15 | Stop reason: HOSPADM

## 2018-02-14 RX ORDER — OXYCODONE HYDROCHLORIDE 5 MG/1
2.5 TABLET ORAL EVERY 4 HOURS PRN
Qty: 20 TABLET | Refills: 0 | Status: SHIPPED | OUTPATIENT
Start: 2018-02-14 | End: 2018-02-21

## 2018-02-14 RX ADMIN — CYCLOBENZAPRINE HYDROCHLORIDE 5 MG: 10 TABLET, FILM COATED ORAL at 13:38

## 2018-02-14 RX ADMIN — TRAMADOL HYDROCHLORIDE 50 MG: 50 TABLET, COATED ORAL at 17:47

## 2018-02-14 RX ADMIN — DOCUSATE SODIUM 100 MG: 100 CAPSULE, LIQUID FILLED ORAL at 22:34

## 2018-02-14 RX ADMIN — METOPROLOL SUCCINATE 25 MG: 25 TABLET, FILM COATED, EXTENDED RELEASE ORAL at 09:54

## 2018-02-14 RX ADMIN — CIPROFLOXACIN HYDROCHLORIDE 250 MG: 500 TABLET, FILM COATED ORAL at 22:35

## 2018-02-14 RX ADMIN — ATORVASTATIN CALCIUM 40 MG: 40 TABLET, FILM COATED ORAL at 22:35

## 2018-02-14 RX ADMIN — TRAMADOL HYDROCHLORIDE 50 MG: 50 TABLET, COATED ORAL at 05:50

## 2018-02-14 RX ADMIN — DOCUSATE SODIUM 100 MG: 100 CAPSULE, LIQUID FILLED ORAL at 09:52

## 2018-02-14 RX ADMIN — DILTIAZEM HYDROCHLORIDE 60 MG: 30 TABLET, FILM COATED ORAL at 13:38

## 2018-02-14 RX ADMIN — PANTOPRAZOLE SODIUM 40 MG: 40 TABLET, DELAYED RELEASE ORAL at 05:50

## 2018-02-14 RX ADMIN — TRAMADOL HYDROCHLORIDE 50 MG: 50 TABLET, COATED ORAL at 22:34

## 2018-02-14 RX ADMIN — CYCLOBENZAPRINE HYDROCHLORIDE 5 MG: 10 TABLET, FILM COATED ORAL at 22:35

## 2018-02-14 RX ADMIN — MEGESTROL ACETATE 20 MG: 20 TABLET ORAL at 11:47

## 2018-02-14 RX ADMIN — CYCLOBENZAPRINE HYDROCHLORIDE 5 MG: 10 TABLET, FILM COATED ORAL at 09:52

## 2018-02-14 RX ADMIN — DILTIAZEM HYDROCHLORIDE 60 MG: 30 TABLET, FILM COATED ORAL at 09:55

## 2018-02-14 RX ADMIN — RIVAROXABAN 15 MG: 15 TABLET, FILM COATED ORAL at 17:47

## 2018-02-14 RX ADMIN — CIPROFLOXACIN HYDROCHLORIDE 250 MG: 500 TABLET, FILM COATED ORAL at 09:53

## 2018-02-14 RX ADMIN — DILTIAZEM HYDROCHLORIDE 60 MG: 30 TABLET, FILM COATED ORAL at 22:34

## 2018-02-14 RX ADMIN — TRAMADOL HYDROCHLORIDE 50 MG: 50 TABLET, COATED ORAL at 11:47

## 2018-02-14 ASSESSMENT — PAIN DESCRIPTION - ONSET
ONSET: ON-GOING
ONSET: ON-GOING

## 2018-02-14 ASSESSMENT — PAIN DESCRIPTION - ORIENTATION
ORIENTATION: LEFT
ORIENTATION: LEFT
ORIENTATION: RIGHT

## 2018-02-14 ASSESSMENT — PAIN DESCRIPTION - PAIN TYPE
TYPE: CHRONIC PAIN
TYPE: ACUTE PAIN;CHRONIC PAIN
TYPE: ACUTE PAIN
TYPE: ACUTE PAIN

## 2018-02-14 ASSESSMENT — PAIN SCALES - GENERAL
PAINLEVEL_OUTOF10: 0
PAINLEVEL_OUTOF10: 3
PAINLEVEL_OUTOF10: 4
PAINLEVEL_OUTOF10: 6
PAINLEVEL_OUTOF10: 6
PAINLEVEL_OUTOF10: 0
PAINLEVEL_OUTOF10: 4
PAINLEVEL_OUTOF10: 4
PAINLEVEL_OUTOF10: 2

## 2018-02-14 ASSESSMENT — PAIN DESCRIPTION - LOCATION
LOCATION: SHOULDER
LOCATION: HIP;SHOULDER
LOCATION: SHOULDER
LOCATION: BACK;HIP

## 2018-02-14 ASSESSMENT — PAIN DESCRIPTION - FREQUENCY
FREQUENCY: CONTINUOUS
FREQUENCY: CONTINUOUS

## 2018-02-14 ASSESSMENT — PAIN DESCRIPTION - DESCRIPTORS
DESCRIPTORS: ACHING;SHARP
DESCRIPTORS: ACHING;CONSTANT

## 2018-02-14 ASSESSMENT — PAIN DESCRIPTION - PROGRESSION
CLINICAL_PROGRESSION: NOT CHANGED
CLINICAL_PROGRESSION: GRADUALLY WORSENING
CLINICAL_PROGRESSION: NOT CHANGED
CLINICAL_PROGRESSION: NOT CHANGED

## 2018-02-14 NOTE — DISCHARGE SUMMARY
Discharge Summary    Patient:  Eliezer Courtney  YOB: 1926    MRN: [id number]   Acct: [de-identified]    Primary Care Physician: Siomara Rasmussen CNP    Admit date:  2/2/2018    Discharge date:   02/14/18      Discharge Diagnoses:   Closed right radial fracture, closed, initial encounter  Principal Problem:    Closed right radial fracture, closed, initial encounter  Resolved Problems:    * No resolved hospital problems. *      Admitted for: [facility] Course: pt was treated for closed R radial fracture, splint was applied. She was treated for UTI, pain well controlled. Will be DC to SNF to complete postacute rehabilitation and PO atbs for proteus mirabilis UTI     Consultants:  Ortho/pain management    Discharge Medications:     Medication List      START taking these medications    ciprofloxacin 500 MG tablet  Commonly known as:  CIPRO  Take 1 tablet by mouth every 12 hours for 5 days     cyclobenzaprine 5 MG tablet  Commonly known as:  FLEXERIL  Take 1 tablet by mouth 3 times daily for 10 days     megestrol 20 MG tablet  Commonly known as:  MEGACE  Take 1 tablet by mouth daily  Start taking on:  2/15/2018     oxyCODONE 5 MG immediate release tablet  Commonly known as:  ROXICODONE  Take 0.5 tablets by mouth every 4 hours as needed for Pain for up to 7 days.         CONTINUE taking these medications    AMITIZA 8 MCG Caps capsule  Generic drug:  lubiprostone  TAKE 1 CAPSULE BY MOUTH ONCE DAILY     atorvastatin 40 MG tablet  Commonly known as:  LIPITOR  Take 1 tablet by mouth daily     diltiazem 60 MG tablet  Commonly known as:  CARDIZEM  Take 1 tablet by mouth 3 times daily     docusate 100 MG Caps  Commonly known as:  COLACE, DULCOLAX  Take 100 mg by mouth 2 times daily     fluocinonide 0.05 % cream  Commonly known as:  LIDEX  Apply topically 2 times daily as needed (itching)     fluticasone 50 MCG/ACT nasal spray  Commonly known as:  FLONASE  1 spray by Nasal route daily     metoprolol succinate 25 MG extended release tablet  Commonly known as:  TOPROL XL  TAKE 1 TABLET BY MOUTH ONCE DAILY     MULTIVITAMIN PO     omeprazole 20 MG delayed release capsule  Commonly known as:  PRILOSEC  Take 1 capsule by mouth Daily     PROBIOTIC ACIDOPHILUS Tabs  Take 1 tablet by mouth 2 times daily     rivaroxaban 15 MG Tabs tablet  Commonly known as:  XARELTO  Take 1 tablet by mouth daily (with breakfast)     traMADol 50 MG tablet  Commonly known as:  ULTRAM  TAKE 1/2 TABLET BY MOUTH EVERY 4 TO 6 HOURS AS NEEDED FOR PAIN        STOP taking these medications    CLARITIN 10 MG tablet  Generic drug:  loratadine     losartan 50 MG tablet  Commonly known as:  COZAAR     nystatin-triamcinolone 553996-5.1 UNIT/GM-% cream  Commonly known as:  MYCOLOG II           Where to Get Your Medications      These medications were sent to [facility], Andrei Crenshaw   [address]    Phone:  [phone]   · ciprofloxacin 500 MG tablet  · cyclobenzaprine 5 MG tablet  · megestrol 20 MG tablet     You can get these medications from any pharmacy    Bring a paper prescription for each of these medications  · oxyCODONE 5 MG immediate release tablet         Physical Exam:    Vitals:  Vitals:    02/14/18 0600 02/14/18 0952 02/14/18 1338 02/14/18 1339   BP:  131/60 (!) 132/55 (!) 132/55   Pulse:    63   Resp:       Temp:       TempSrc:       SpO2:    (!) 79%   Weight: 103 lb 13.4 oz (47.1 kg)      Height:         Weight: Weight: 103 lb 13.4 oz (47.1 kg)     24 hour intake/output:  Intake/Output Summary (Last 24 hours) at 02/14/18 1405  Last data filed at 02/14/18 1336   Gross per 24 hour   Intake             1260 ml   Output                0 ml   Net             1260 ml       General appearance - alert, well appearing, and in no distress  Chest - clear to auscultation, no wheezes, rales or rhonchi, symmetric air entry  Heart - normal rate, regular rhythm, normal S1, S2, no murmurs, rubs, clicks or gallops  Abdomen - soft, nontender, nondistended, no masses or organomegaly  Obese: No; Protuberant: No   Neurological - alert, oriented, normal speech, no focal findings or movement disorder noted  Extremities - R hand -splint  Skin - normal coloration and turgor, no rashes, no suspicious skin lesions noted        Radiology reports as per the Radiologist  Radiology: No results found.          Diet:  DIET GENERAL; Dental Soft  Dietary Nutrition Supplements: Standard High Calorie Oral Supplement    Activity:  Activity as tolerated (Patient may move about with assist as indicated or with supervision.)    Follow-up:  in 1 weeks with Stephen Rascon CNP,      Disposition: SNF    Condition: Stable    Time Spent: 45 minutes    Electronically signed by Kassandra Geller MD on 2/14/2018 at 2:05 PM    Discharging Hospitalist

## 2018-02-14 NOTE — PLAN OF CARE
Problem: Pain:  Goal: Pain level will decrease  Pain level will decrease   Outcome: Ongoing    Goal: Control of acute pain  Control of acute pain   Outcome: Ongoing    Goal: Control of chronic pain  Control of chronic pain   Outcome: Ongoing      Problem: Falls - Risk of  Goal: Absence of falls  Outcome: Ongoing      Problem: Risk for Impaired Skin Integrity  Goal: Tissue integrity - skin and mucous membranes  Structural intactness and normal physiological function of skin and  mucous membranes.    Outcome: Ongoing      Problem: Risk for Infection  Goal: Remain free from symptoms of infection  Patient will remain free from symptoms of infection:  - redness  - warmth  - increased temperature  - discharge     Outcome: Ongoing

## 2018-02-14 NOTE — PROGRESS NOTES
100 lb 1.4 oz (45.4 kg), % Ideal Body > 100%  · Adjusted Body Wt:  , body weight adjusted for    · BMI Classification: BMI 18.5 - 24.9 Normal Weight  · Comparative Standards (Estimated Nutrition Needs):  · Estimated Daily Total Kcal: 6718-1077  · Estimated Daily Protein (g): 54-64    Estimated Intake vs Estimated Needs: Intake Less Than Needs    Nutrition Risk Level: High    Nutrition Interventions:   Continue current diet, Continue current ONS  Continued Inpatient Monitoring    Nutrition Evaluation:   · Evaluation: No progress toward goals   · Goals: po intake 50-75% ofmeals & ONS. Maintain weight in UBW range. · Monitoring: Meal Intake, Supplement Intake, Ascites/Edema, Fluid Balance, Weight, Pertinent Labs, Constipation    See Adult Nutrition Doc Flowsheet for more detail.      Electronically signed by Joey Mejia RD, LD on 2/14/18 at 3:27 PM

## 2018-02-14 NOTE — PROGRESS NOTES
Physical Therapy  Facility/Department: [facility] MED SURG UNIT  Daily Treatment Note  NAME: Purvi Del Rio  : 10/9/1926  MRN: [id number]    Date of Service: 2018    Patient Diagnosis(es):   Patient Active Problem List    Diagnosis Date Noted    Closed right radial fracture, closed, initial encounter 2018    Radial head fracture, closed, right, closed, initial encounter 2018    Radial head fracture, closed, left, closed, initial encounter 2018    Cellulitis of right leg 2018    Cellulitis and abscess of right lower extremity 2018    Bilateral carotid artery stenosis 10/27/2017    Central retinal artery occlusion, right eye 10/17/2017    Vision loss of right eye 10/17/2017    Status post total replacement of right hip 2017    Varicose veins of both lower extremities 2017    Bruit of left carotid artery 2017    Atrial fibrillation ([address] 2014    HTN (hypertension) 2013    Peripheral edema 2013    Vitamin D deficiency     Dyslipidemia     Osteoporosis     Chronic constipation     Neurodermatitis     Chronic venous insufficiency     Anxiety     Seronegative rheumatoid arthritis ([address]        Past Medical History:   Diagnosis Date    Anxiety     Atrial fibrillation ([address]     Bilateral carotid artery stenosis 10/27/2017    Central retinal artery occlusion, right eye 10/17/2017    Chronic constipation     Chronic venous insufficiency     Dyslipidemia     Essential hypertension     HTN (hypertension) 2013    Neurodermatitis     chronic, of bilateral lower extremities    Osteoporosis     Pap test, as part of routine gynecological examination     years ago   [facility] Screening mammogram     about 5 yrs ago    Seronegative rheumatoid arthritis ([address]     Status post total replacement of right hip 2017    Vision loss of right eye 10/17/2017    Overview:  - 80year old female who presents with PMH of HTN,AFIB(declined Shoulder  Pain Orientation: Left  Vital Signs  Patient Currently in Pain: Yes       Orientation     Objective      Transfers  Sit to Stand: Moderate Assistance;Minimal Assistance (min-modAx2 w/ maxVC's. pt has diff following thru w/ VC's)  Stand to sit: Minimal Assistance (minAx2 )        Balance  Standing - Static: Poor;+  Standing - Dynamic: Poor;+  Comments: pt fearful of falling. attempted stand at rao walker with pt unable to complete upright posture and displaying retro lean w/ maxVC's and no follow through. Pt then performed several sit<>stand transfers in lilly steady w/ maxVC's, tactile cues and hand placement w/ poor follow through. Pt continues to display retro lean and unable to bring her hips over her LE's to complete stand secondary to fear and anxiety. Assessment   Body structures, Functions, Activity limitations: Decreased functional mobility ; Decreased strength;Decreased endurance  Assessment: Performed co-treat with OT for safety with transfers. [facility] with pt unable to complete stand. Pt limited by anxiety and fear and was unable to complete upright  lilly steady with maxVC's/tactile cues and demo. Pt having difficulty with bowel movement. Notified nursing. Pt returned to bed with call light and bed alarm in place. Continue to progress as анна. Treatment Diagnosis: Decreased fucntional mobility  REQUIRES PT FOLLOW UP: Yes  Activity Tolerance  Activity Tolerance: Patient limited by pain; Other       Discharge Recommendations:  Subacute/Skilled Nursing Facility    G-Code     OutComes Score                                                    AM-PAC Score             Goals  Short term goals  Time Frame for Short term goals: 3-5 days  Short term goal 1: sit to stand with <= Min A x1   Short term goal 2: Perform stand pivot transfers with  mod assit x1  Short term goal 3: tolerate 2x10 reps LE LUDIN to increase strength for functional

## 2018-02-14 NOTE — PROGRESS NOTES
Pt discharge to [facility] held until tomorrow. Pt needs scripts for narcotics, unable to get from Dr signed until tomorrow. Dr Rosa Hinkle, Stefan hanson and [facility] all aware. Lauren was going to notify family.

## 2018-02-15 ENCOUNTER — OFFICE VISIT (OUTPATIENT)
Dept: GERIATRIC MEDICINE | Age: 83
End: 2018-02-15
Payer: COMMERCIAL

## 2018-02-15 VITALS
DIASTOLIC BLOOD PRESSURE: 60 MMHG | SYSTOLIC BLOOD PRESSURE: 115 MMHG | HEIGHT: 58 IN | WEIGHT: 103.84 LBS | RESPIRATION RATE: 16 BRPM | HEART RATE: 61 BPM | TEMPERATURE: 98.2 F | BODY MASS INDEX: 21.8 KG/M2 | OXYGEN SATURATION: 100 %

## 2018-02-15 DIAGNOSIS — I48.20 CHRONIC ATRIAL FIBRILLATION (HCC): ICD-10-CM

## 2018-02-15 DIAGNOSIS — S52.101A CLOSED FRACTURE OF PROXIMAL END OF RIGHT RADIUS, UNSPECIFIED FRACTURE MORPHOLOGY, INITIAL ENCOUNTER: Primary | ICD-10-CM

## 2018-02-15 DIAGNOSIS — M80.079G: ICD-10-CM

## 2018-02-15 DIAGNOSIS — I10 ESSENTIAL HYPERTENSION: ICD-10-CM

## 2018-02-15 LAB
ANION GAP SERPL CALCULATED.3IONS-SCNC: 20 MEQ/L (ref 7–13)
BASOPHILS ABSOLUTE: 0 K/UL (ref 0–0.2)
BASOPHILS RELATIVE PERCENT: 0.5 %
BUN BLDV-MCNC: 37 MG/DL (ref 8–23)
CALCIUM SERPL-MCNC: 10.1 MG/DL (ref 8.6–10.2)
CHLORIDE BLD-SCNC: 97 MEQ/L (ref 98–107)
CO2: 18 MEQ/L (ref 22–29)
CREAT SERPL-MCNC: 0.63 MG/DL (ref 0.5–0.9)
EOSINOPHILS ABSOLUTE: 0.5 K/UL (ref 0–0.7)
EOSINOPHILS RELATIVE PERCENT: 4.9 %
GFR AFRICAN AMERICAN: >60
GFR NON-AFRICAN AMERICAN: >60
GLUCOSE BLD-MCNC: 83 MG/DL (ref 74–109)
HCT VFR BLD CALC: 42.9 % (ref 37–47)
HEMOGLOBIN: 14.1 G/DL (ref 12–16)
LYMPHOCYTES ABSOLUTE: 1.5 K/UL (ref 1–4.8)
LYMPHOCYTES RELATIVE PERCENT: 15.2 %
MCH RBC QN AUTO: 30.8 PG (ref 27–31.3)
MCHC RBC AUTO-ENTMCNC: 32.8 % (ref 33–37)
MCV RBC AUTO: 93.9 FL (ref 82–100)
MONOCYTES ABSOLUTE: 0.8 K/UL (ref 0.2–0.8)
MONOCYTES RELATIVE PERCENT: 8.5 %
NEUTROPHILS ABSOLUTE: 7.1 K/UL (ref 1.4–6.5)
NEUTROPHILS RELATIVE PERCENT: 70.9 %
ORGANISM: ABNORMAL
PDW BLD-RTO: 15.9 % (ref 11.5–14.5)
PLATELET # BLD: 489 K/UL (ref 130–400)
POTASSIUM SERPL-SCNC: 5 MEQ/L (ref 3.5–5.1)
RBC # BLD: 4.57 M/UL (ref 4.2–5.4)
SODIUM BLD-SCNC: 135 MEQ/L (ref 132–144)
URINE CULTURE, ROUTINE: ABNORMAL
URINE CULTURE, ROUTINE: ABNORMAL
WBC # BLD: 10 K/UL (ref 4.8–10.8)

## 2018-02-15 PROCEDURE — 97530 THERAPEUTIC ACTIVITIES: CPT

## 2018-02-15 PROCEDURE — 97110 THERAPEUTIC EXERCISES: CPT

## 2018-02-15 PROCEDURE — 80048 BASIC METABOLIC PNL TOTAL CA: CPT

## 2018-02-15 PROCEDURE — 1123F ACP DISCUSS/DSCN MKR DOCD: CPT | Performed by: INTERNAL MEDICINE

## 2018-02-15 PROCEDURE — 99305 1ST NF CARE MODERATE MDM 35: CPT | Performed by: INTERNAL MEDICINE

## 2018-02-15 PROCEDURE — 85025 COMPLETE CBC W/AUTO DIFF WBC: CPT

## 2018-02-15 PROCEDURE — 36415 COLL VENOUS BLD VENIPUNCTURE: CPT

## 2018-02-15 PROCEDURE — 6370000000 HC RX 637 (ALT 250 FOR IP): Performed by: INTERNAL MEDICINE

## 2018-02-15 PROCEDURE — 97535 SELF CARE MNGMENT TRAINING: CPT

## 2018-02-15 RX ORDER — TRAMADOL HYDROCHLORIDE 50 MG/1
50 TABLET ORAL EVERY 6 HOURS PRN
Qty: 50 TABLET | Refills: 0 | Status: SHIPPED | OUTPATIENT
Start: 2018-02-15 | End: 2018-02-22

## 2018-02-15 RX ADMIN — PANTOPRAZOLE SODIUM 40 MG: 40 TABLET, DELAYED RELEASE ORAL at 05:17

## 2018-02-15 RX ADMIN — CYCLOBENZAPRINE HYDROCHLORIDE 5 MG: 10 TABLET, FILM COATED ORAL at 08:43

## 2018-02-15 RX ADMIN — METOPROLOL SUCCINATE 25 MG: 25 TABLET, FILM COATED, EXTENDED RELEASE ORAL at 08:43

## 2018-02-15 RX ADMIN — DILTIAZEM HYDROCHLORIDE 60 MG: 30 TABLET, FILM COATED ORAL at 08:43

## 2018-02-15 RX ADMIN — DOCUSATE SODIUM 100 MG: 100 CAPSULE, LIQUID FILLED ORAL at 08:43

## 2018-02-15 RX ADMIN — MEGESTROL ACETATE 20 MG: 20 TABLET ORAL at 08:43

## 2018-02-15 RX ADMIN — CIPROFLOXACIN HYDROCHLORIDE 250 MG: 500 TABLET, FILM COATED ORAL at 08:43

## 2018-02-15 RX ADMIN — TRAMADOL HYDROCHLORIDE 50 MG: 50 TABLET, COATED ORAL at 11:27

## 2018-02-15 RX ADMIN — TRAMADOL HYDROCHLORIDE 50 MG: 50 TABLET, COATED ORAL at 05:17

## 2018-02-15 ASSESSMENT — PAIN DESCRIPTION - PAIN TYPE
TYPE: ACUTE PAIN
TYPE: ACUTE PAIN

## 2018-02-15 ASSESSMENT — PAIN DESCRIPTION - ORIENTATION
ORIENTATION: RIGHT
ORIENTATION: RIGHT

## 2018-02-15 ASSESSMENT — PAIN SCALES - GENERAL
PAINLEVEL_OUTOF10: 0
PAINLEVEL_OUTOF10: 7
PAINLEVEL_OUTOF10: 0
PAINLEVEL_OUTOF10: 1
PAINLEVEL_OUTOF10: 0
PAINLEVEL_OUTOF10: 7
PAINLEVEL_OUTOF10: 0

## 2018-02-15 ASSESSMENT — PAIN DESCRIPTION - PROGRESSION
CLINICAL_PROGRESSION: GRADUALLY IMPROVING

## 2018-02-15 ASSESSMENT — PAIN DESCRIPTION - LOCATION
LOCATION: BACK
LOCATION: BACK

## 2018-02-15 ASSESSMENT — PAIN DESCRIPTION - ONSET: ONSET: ON-GOING

## 2018-02-15 ASSESSMENT — PAIN DESCRIPTION - DESCRIPTORS: DESCRIPTORS: ACHING;SHARP

## 2018-02-15 ASSESSMENT — PAIN DESCRIPTION - FREQUENCY: FREQUENCY: CONTINUOUS

## 2018-02-15 NOTE — PROGRESS NOTES
Patient notified of transfer to [facility], report called to Brittany Renae at [facility] at [phone]. Paperwork printed, encouraged facility to call with any questions.  to arrange transport.

## 2018-02-15 NOTE — PROGRESS NOTES
AC/aspirin),HLP presented to OSH yesterday evening with C/O right eye vision loss around 5 pm yesterday. Painless. - Had CT head without contrast at OSH ED that shows: No acute intracranial abnormality. 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 - EKG shows sinus bradycardia with PAC(R-58) - Concern for retinal artery occlusion?> 12 hours have passed. - Afib related? Carotid bruit? - BP was 210/85 initially and then came down to 160/69 -Normal CRP - No other acute neuro deficit seen. -Spoke to Optho on XNWL(02832)- alon Villalta and he will arrange for pt to be seen today at [facility]. No acute intervention needed as pt well out of time frame(as per optho). Plan: - Admit to [facility] consult. Await optho input and consider further rx options after etiology is confirmed. - Fall precautions - Neuro checks - ECHO - Carotid doppler  Will monitor.  Vitamin D deficiency      Past Surgical History:   Procedure Laterality Date    ROTATOR CUFF REPAIR Right     TONSILLECTOMY      TOTAL HIP ARTHROPLASTY Right 2009       Restrictions  Restrictions/Precautions  Restrictions/Precautions: Fall Risk  Required Braces or Orthoses?: Yes  Implants present? : Metal implants  Upper Extremity Weight Bearing Restrictions  Right Upper Extremity Weight Bearing: Weight Bearing As Tolerated  Required Braces or Orthoses  Right Lower Extremity Brace: Ankle Foot Orthotics  Left Lower Extremity Brace: Loli Foot Orthotics  Right Upper Extremity Brace/Splint:  (cast, sling when OOB)  Position Activity Restriction  Other position/activity restrictions: Natasha  Subjective   General  Chart Reviewed: Yes  Family / Caregiver Present: No  Referring Practitioner: Dr Yi Pugh: Pt lying in bed upon arrival and agreeable to therapy. Pt states \"I need to use restroom. \"  Pain Screening  Patient Currently in Pain: Yes  Vital Signs  Patient Currently in Pain: Yes       Orientation

## 2018-02-16 RX ORDER — ACETAMINOPHEN 325 MG/1
650 TABLET ORAL EVERY 4 HOURS PRN
COMMUNITY

## 2018-02-16 RX ORDER — ACETAMINOPHEN 650 MG/1
650 SUPPOSITORY RECTAL EVERY 4 HOURS PRN
Status: ON HOLD | COMMUNITY
End: 2018-03-21 | Stop reason: HOSPADM

## 2018-02-19 LAB
ANION GAP SERPL CALCULATED.3IONS-SCNC: 15 MEQ/L (ref 7–13)
BUN BLDV-MCNC: 30 MG/DL
BUN BLDV-MCNC: 30 MG/DL (ref 8–23)
CALCIUM SERPL-MCNC: 9 MG/DL
CALCIUM SERPL-MCNC: 9 MG/DL (ref 8.6–10.2)
CHLORIDE BLD-SCNC: 103 MEQ/L (ref 98–107)
CHLORIDE BLD-SCNC: 103 MMOL/L
CO2: 18 MEQ/L (ref 22–29)
CO2: 18 MMOL/L
CREAT SERPL-MCNC: 0.53 MG/DL
CREAT SERPL-MCNC: 0.53 MG/DL (ref 0.5–0.9)
GFR AFRICAN AMERICAN: >60
GFR CALCULATED: NORMAL
GFR NON-AFRICAN AMERICAN: >60
GLUCOSE BLD-MCNC: 76 MG/DL
GLUCOSE BLD-MCNC: 76 MG/DL (ref 74–109)
HCT VFR BLD CALC: 39.9 % (ref 37–47)
HEMOGLOBIN: 13.2 G/DL (ref 12–16)
MCH RBC QN AUTO: 31.3 PG (ref 27–31.3)
MCHC RBC AUTO-ENTMCNC: 33.1 % (ref 33–37)
MCV RBC AUTO: 94.6 FL (ref 82–100)
PDW BLD-RTO: 15.4 % (ref 11.5–14.5)
PLATELET # BLD: 397 K/UL (ref 130–400)
POTASSIUM SERPL-SCNC: 4.5 MEQ/L (ref 3.5–5.1)
POTASSIUM SERPL-SCNC: 4.5 MMOL/L
RBC # BLD: 4.22 M/UL (ref 4.2–5.4)
SODIUM BLD-SCNC: 136 MEQ/L (ref 132–144)
SODIUM BLD-SCNC: 136 MMOL/L
WBC # BLD: 9.8 K/UL (ref 4.8–10.8)

## 2018-03-06 VITALS — SYSTOLIC BLOOD PRESSURE: 113 MMHG | TEMPERATURE: 96.1 F | DIASTOLIC BLOOD PRESSURE: 66 MMHG | HEART RATE: 82 BPM

## 2018-03-06 NOTE — PROGRESS NOTES
PATIENT: Danita Ford : 10/09/1926 DOS: 02/15/2018     [facility] COMMU    Admission History and Physical.    CHIEF COMPLAINT:status post fall, fracture of the right wrist, atrial fibrillation, UTI.     HPI: This is a 80-year-old woman, who was evaluated. The patient was hospitalized after a recent fall, had injury, was found to have a fracture in the right wrist and did undergo casting. The patient was stabilized. She had a large hematoma over her right hip. No evidence of acute fracture. The patient did undergo a course of physical therapy and occupational therapy. She was stabilized. She did make gains. She is quite confused. There is concern for possible urinary tract infection. The patient had a prior urinalysis, started on a course of Cipro. The patient did make gains. This patient has a history of atrial fibrillation, was rate controlled. She was stabilized and transferred here for ongoing care. Today, she is seated up in her room, pleasant, but somewhat anxious, globally weak, frail appearing. PAST MEDICAL HISTORY: Include vitamin D deficiency, hyperlipidemia, osteoporosis, chronic constipation, rheumatoid arthritis, atrial fibrillation, degenerative joint disease, status post hip placement, edema lower extremity, UTI. MEDICATIONS: Medications on arrival included Cipro 500 mg twice daily for 5 days, Flexeril 5 mg twice daily for 10 days, Megace 20 mg daily, oxycodone 5 mg half tablet every 4 hours as needed, tramadol 50 mg 1 tablet every 6 hours as needed, Amitiza 8 mcg capsules daily, atorvastatin 40 mg daily, diltiazem 60 mg 3 times daily, docusate 100 mg daily, metoprolol succinate 200 mg daily, omeprazole 20 mg daily, rivaroxaban 15 mg daily, losartan 50 mg daily. FAMILY HISTORY: Negative for early onset neoplasm. SOCIAL HISTORY: The patient is a nonsmoker and nondrinker. She is a community dwelling individual.     REVIEW OF SYSTEMS: The patient is frail, somewhat anxious.

## 2018-03-09 ENCOUNTER — OFFICE VISIT (OUTPATIENT)
Dept: GERIATRIC MEDICINE | Age: 83
End: 2018-03-09
Payer: COMMERCIAL

## 2018-03-09 DIAGNOSIS — F41.9 ANXIETY AND DEPRESSION: Primary | ICD-10-CM

## 2018-03-09 DIAGNOSIS — H04.123 DRY EYES: ICD-10-CM

## 2018-03-09 DIAGNOSIS — E78.5 HYPERLIPIDEMIA, UNSPECIFIED HYPERLIPIDEMIA TYPE: ICD-10-CM

## 2018-03-09 DIAGNOSIS — F32.A ANXIETY AND DEPRESSION: Primary | ICD-10-CM

## 2018-03-09 DIAGNOSIS — R63.0 POOR APPETITE: ICD-10-CM

## 2018-03-09 DIAGNOSIS — M79.601 RIGHT ARM PAIN: ICD-10-CM

## 2018-03-09 PROCEDURE — 1123F ACP DISCUSS/DSCN MKR DOCD: CPT | Performed by: NURSE PRACTITIONER

## 2018-03-09 PROCEDURE — 99309 SBSQ NF CARE MODERATE MDM 30: CPT | Performed by: NURSE PRACTITIONER

## 2018-03-09 RX ORDER — DIPHENHYDRAMINE HCL 25 MG
2 CAPSULE ORAL 3 TIMES DAILY
Qty: 1 BOTTLE | Refills: 0
Start: 2018-03-09

## 2018-03-09 ASSESSMENT — ENCOUNTER SYMPTOMS
EYE PAIN: 0
ABDOMINAL PAIN: 0
EYE DISCHARGE: 0
WHEEZING: 0
EYE REDNESS: 0
COUGH: 0
CONSTIPATION: 1
SHORTNESS OF BREATH: 1

## 2018-03-09 NOTE — PROGRESS NOTES
Past Medical History:   Diagnosis Date    Anxiety     [address]     Bilateral carotid artery stenosis 10/27/2017    Central retinal artery occlusion, right eye 10/17/2017    Chronic constipation     Chronic venous insufficiency     Dyslipidemia     Essential hypertension     HTN (hypertension) 6/29/2013    Neurodermatitis     chronic, of bilateral lower extremities    Osteoporosis     Pap test, as part of routine gynecological examination     years ago    Screening mammogram     about 5 yrs ago    Seronegative rheumatoid arthritis ([facility]     Status post total replacement of right hip 6/12/2017    Vision loss of right eye 10/17/2017        Vitamin D deficiency      Past Surgical History:   Procedure Laterality Date    ROTATOR CUFF REPAIR Right     TONSILLECTOMY      TOTAL HIP ARTHROPLASTY Right 2009     Medications reviewed at facility. Objective  Vitals:    03/09/18 0958   BP: 118/62   Pulse: 59   Resp: 18   Temp: 97.6 °F (36.4 °C)   SpO2: 98%   Weight: 107 lb (48.5 kg)   Height: 4' 10\" (1.473 m)     Physical Exam   Constitutional: She appears unhealthy. No distress. Eyes: Conjunctivae are normal. Right eye exhibits no discharge. Left eye exhibits no discharge. Cardiovascular: Normal rate, regular rhythm and normal heart sounds. No lower extremity edema   Pulmonary/Chest: Effort normal and breath sounds normal. She has no wheezes. Abdominal: Soft. Bowel sounds are normal. There is no tenderness. Musculoskeletal:        Right wrist: She exhibits decreased range of motion. Right arm hard cast elbow to fingers   Psychiatric: Affect normal. Her mood appears anxious. She exhibits a depressed mood. Able to state correct year, town     [address] [place], [place] [address]way   1. Anxiety and depression      Monitoring; manage with nonpharmacological  interventions at this point. Discussed adding Remeron if appetite worsens.    2. Hyperlipidemia, unspecified hyperlipidemia type      On

## 2018-03-12 VITALS
BODY MASS INDEX: 22.46 KG/M2 | WEIGHT: 107 LBS | SYSTOLIC BLOOD PRESSURE: 118 MMHG | HEIGHT: 58 IN | RESPIRATION RATE: 18 BRPM | OXYGEN SATURATION: 98 % | DIASTOLIC BLOOD PRESSURE: 62 MMHG | TEMPERATURE: 97.6 F | HEART RATE: 59 BPM

## 2018-03-12 LAB
ALBUMIN SERPL-MCNC: 3.3 G/DL (ref 3.9–4.9)
ALP BLD-CCNC: 101 U/L (ref 40–130)
ALT SERPL-CCNC: 14 U/L (ref 0–33)
ANION GAP SERPL CALCULATED.3IONS-SCNC: 15 MEQ/L (ref 7–13)
AST SERPL-CCNC: 17 U/L (ref 0–35)
BILIRUB SERPL-MCNC: 0.5 MG/DL (ref 0–1.2)
BUN BLDV-MCNC: 10 MG/DL (ref 8–23)
CALCIUM SERPL-MCNC: 8.7 MG/DL (ref 8.6–10.2)
CHLORIDE BLD-SCNC: 101 MEQ/L (ref 98–107)
CHOLESTEROL, TOTAL: 148 MG/DL (ref 0–199)
CO2: 20 MEQ/L (ref 22–29)
CREAT SERPL-MCNC: 0.34 MG/DL (ref 0.5–0.9)
GFR AFRICAN AMERICAN: >60
GFR NON-AFRICAN AMERICAN: >60
GLOBULIN: 2.2 G/DL (ref 2.3–3.5)
GLUCOSE BLD-MCNC: 84 MG/DL (ref 74–109)
HCT VFR BLD CALC: 39.3 % (ref 37–47)
HDLC SERPL-MCNC: 45 MG/DL (ref 40–59)
HEMOGLOBIN: 13.2 G/DL (ref 12–16)
LDL CHOLESTEROL CALCULATED: 79 MG/DL (ref 0–129)
MCH RBC QN AUTO: 31.7 PG (ref 27–31.3)
MCHC RBC AUTO-ENTMCNC: 33.7 % (ref 33–37)
MCV RBC AUTO: 94.2 FL (ref 82–100)
PDW BLD-RTO: 15.6 % (ref 11.5–14.5)
PLATELET # BLD: 244 K/UL (ref 130–400)
POTASSIUM SERPL-SCNC: 4 MEQ/L (ref 3.5–5.1)
RBC # BLD: 4.17 M/UL (ref 4.2–5.4)
SODIUM BLD-SCNC: 136 MEQ/L (ref 132–144)
TOTAL PROTEIN: 5.5 G/DL (ref 6.4–8.1)
TRIGL SERPL-MCNC: 119 MG/DL (ref 0–200)
WBC # BLD: 9.7 K/UL (ref 4.8–10.8)

## 2018-03-17 LAB
ANION GAP SERPL CALCULATED.3IONS-SCNC: 18 MEQ/L (ref 7–13)
BUN BLDV-MCNC: 13 MG/DL (ref 8–23)
CALCIUM SERPL-MCNC: 9.7 MG/DL (ref 8.6–10.2)
CHLORIDE BLD-SCNC: 103 MEQ/L (ref 98–107)
CO2: 19 MEQ/L (ref 22–29)
CREAT SERPL-MCNC: 0.41 MG/DL (ref 0.5–0.9)
GFR AFRICAN AMERICAN: >60
GFR NON-AFRICAN AMERICAN: >60
GLUCOSE BLD-MCNC: 106 MG/DL (ref 74–109)
HCT VFR BLD CALC: 37.9 % (ref 37–47)
HEMOGLOBIN: 12.9 G/DL (ref 12–16)
MCH RBC QN AUTO: 32.1 PG (ref 27–31.3)
MCHC RBC AUTO-ENTMCNC: 34.1 % (ref 33–37)
MCV RBC AUTO: 93.9 FL (ref 82–100)
PDW BLD-RTO: 15.7 % (ref 11.5–14.5)
PLATELET # BLD: 296 K/UL (ref 130–400)
POTASSIUM SERPL-SCNC: 4 MEQ/L (ref 3.5–5.1)
RBC # BLD: 4.04 M/UL (ref 4.2–5.4)
SODIUM BLD-SCNC: 140 MEQ/L (ref 132–144)
WBC # BLD: 12.1 K/UL (ref 4.8–10.8)

## 2018-03-18 ENCOUNTER — APPOINTMENT (OUTPATIENT)
Dept: GENERAL RADIOLOGY | Age: 83
DRG: 392 | End: 2018-03-18
Payer: COMMERCIAL

## 2018-03-18 ENCOUNTER — HOSPITAL ENCOUNTER (INPATIENT)
Age: 83
LOS: 2 days | Discharge: NURSING, REHAB OR LONG-TERM CARE | DRG: 392 | End: 2018-03-21
Attending: EMERGENCY MEDICINE | Admitting: INTERNAL MEDICINE
Payer: COMMERCIAL

## 2018-03-18 DIAGNOSIS — J18.9 PNEUMONIA DUE TO ORGANISM: Primary | ICD-10-CM

## 2018-03-18 PROBLEM — R50.9 FEVER: Status: ACTIVE | Noted: 2018-03-18

## 2018-03-18 LAB
ALBUMIN SERPL-MCNC: 3.8 G/DL (ref 3.9–4.9)
ALBUMIN SERPL-MCNC: 4.1 G/DL (ref 3.9–4.9)
ALP BLD-CCNC: 109 U/L (ref 40–130)
ALP BLD-CCNC: 112 U/L (ref 40–130)
ALT SERPL-CCNC: 12 U/L (ref 0–33)
ALT SERPL-CCNC: 15 U/L (ref 0–33)
ANION GAP SERPL CALCULATED.3IONS-SCNC: 15 MEQ/L (ref 7–13)
ANION GAP SERPL CALCULATED.3IONS-SCNC: 16 MEQ/L (ref 7–13)
AST SERPL-CCNC: 17 U/L (ref 0–35)
AST SERPL-CCNC: 23 U/L (ref 0–35)
BASOPHILS ABSOLUTE: 0.1 K/UL (ref 0–0.2)
BASOPHILS RELATIVE PERCENT: 0.4 %
BILIRUB SERPL-MCNC: 0.5 MG/DL (ref 0–1.2)
BILIRUB SERPL-MCNC: 0.6 MG/DL (ref 0–1.2)
BILIRUBIN URINE: NEGATIVE
BLOOD, URINE: NEGATIVE
BUN BLDV-MCNC: 14 MG/DL (ref 8–23)
BUN BLDV-MCNC: 16 MG/DL (ref 8–23)
CALCIUM SERPL-MCNC: 9.8 MG/DL (ref 8.6–10.2)
CALCIUM SERPL-MCNC: 9.8 MG/DL (ref 8.6–10.2)
CHLORIDE BLD-SCNC: 103 MEQ/L (ref 98–107)
CHLORIDE BLD-SCNC: 105 MEQ/L (ref 98–107)
CLARITY: CLEAR
CO2: 19 MEQ/L (ref 22–29)
CO2: 21 MEQ/L (ref 22–29)
COLOR: YELLOW
CREAT SERPL-MCNC: 0.41 MG/DL (ref 0.5–0.9)
CREAT SERPL-MCNC: 0.51 MG/DL (ref 0.5–0.9)
EOSINOPHILS ABSOLUTE: 0 K/UL (ref 0–0.7)
EOSINOPHILS RELATIVE PERCENT: 0.3 %
GFR AFRICAN AMERICAN: >60
GFR AFRICAN AMERICAN: >60
GFR NON-AFRICAN AMERICAN: >60
GFR NON-AFRICAN AMERICAN: >60
GLOBULIN: 2.5 G/DL (ref 2.3–3.5)
GLOBULIN: 2.5 G/DL (ref 2.3–3.5)
GLUCOSE BLD-MCNC: 113 MG/DL (ref 74–109)
GLUCOSE BLD-MCNC: 137 MG/DL (ref 74–109)
GLUCOSE URINE: NEGATIVE MG/DL
HCT VFR BLD CALC: 37 % (ref 37–47)
HCT VFR BLD CALC: 38.8 % (ref 37–47)
HEMOGLOBIN: 12.6 G/DL (ref 12–16)
HEMOGLOBIN: 13.5 G/DL (ref 12–16)
KETONES, URINE: NEGATIVE MG/DL
LEUKOCYTE ESTERASE, URINE: NEGATIVE
LYMPHOCYTES ABSOLUTE: 1.2 K/UL (ref 1–4.8)
LYMPHOCYTES RELATIVE PERCENT: 8.8 %
MAGNESIUM: 2 MG/DL (ref 1.7–2.3)
MCH RBC QN AUTO: 32.1 PG (ref 27–31.3)
MCHC RBC AUTO-ENTMCNC: 34.7 % (ref 33–37)
MCV RBC AUTO: 92.4 FL (ref 82–100)
MONOCYTES ABSOLUTE: 0.8 K/UL (ref 0.2–0.8)
MONOCYTES RELATIVE PERCENT: 5.9 %
NEUTROPHILS ABSOLUTE: 11.4 K/UL (ref 1.4–6.5)
NEUTROPHILS RELATIVE PERCENT: 84.6 %
NITRITE, URINE: NEGATIVE
PDW BLD-RTO: 15.6 % (ref 11.5–14.5)
PH UA: 7 (ref 5–9)
PLATELET # BLD: 276 K/UL (ref 130–400)
POTASSIUM REFLEX MAGNESIUM: 3.5 MEQ/L (ref 3.5–5.1)
POTASSIUM SERPL-SCNC: 4.4 MEQ/L (ref 3.5–5.1)
PROTEIN UA: NEGATIVE MG/DL
RBC # BLD: 4.2 M/UL (ref 4.2–5.4)
SODIUM BLD-SCNC: 139 MEQ/L (ref 132–144)
SODIUM BLD-SCNC: 140 MEQ/L (ref 132–144)
SPECIFIC GRAVITY UA: 1.01 (ref 1–1.03)
TOTAL PROTEIN: 6.3 G/DL (ref 6.4–8.1)
TOTAL PROTEIN: 6.6 G/DL (ref 6.4–8.1)
URINE REFLEX TO CULTURE: NORMAL
UROBILINOGEN, URINE: 0.2 E.U./DL
WBC # BLD: 13.5 K/UL (ref 4.8–10.8)

## 2018-03-18 PROCEDURE — 87040 BLOOD CULTURE FOR BACTERIA: CPT

## 2018-03-18 PROCEDURE — 96375 TX/PRO/DX INJ NEW DRUG ADDON: CPT

## 2018-03-18 PROCEDURE — 2580000003 HC RX 258: Performed by: EMERGENCY MEDICINE

## 2018-03-18 PROCEDURE — G0378 HOSPITAL OBSERVATION PER HR: HCPCS

## 2018-03-18 PROCEDURE — 71045 X-RAY EXAM CHEST 1 VIEW: CPT

## 2018-03-18 PROCEDURE — 2580000003 HC RX 258: Performed by: INTERNAL MEDICINE

## 2018-03-18 PROCEDURE — 80053 COMPREHEN METABOLIC PANEL: CPT

## 2018-03-18 PROCEDURE — 96366 THER/PROPH/DIAG IV INF ADDON: CPT

## 2018-03-18 PROCEDURE — 99285 EMERGENCY DEPT VISIT HI MDM: CPT

## 2018-03-18 PROCEDURE — 96367 TX/PROPH/DG ADDL SEQ IV INF: CPT

## 2018-03-18 PROCEDURE — 2500000003 HC RX 250 WO HCPCS: Performed by: EMERGENCY MEDICINE

## 2018-03-18 PROCEDURE — 6370000000 HC RX 637 (ALT 250 FOR IP): Performed by: INTERNAL MEDICINE

## 2018-03-18 PROCEDURE — 2500000003 HC RX 250 WO HCPCS: Performed by: INTERNAL MEDICINE

## 2018-03-18 PROCEDURE — 96365 THER/PROPH/DIAG IV INF INIT: CPT

## 2018-03-18 PROCEDURE — 36415 COLL VENOUS BLD VENIPUNCTURE: CPT

## 2018-03-18 PROCEDURE — 85014 HEMATOCRIT: CPT

## 2018-03-18 PROCEDURE — 85025 COMPLETE CBC W/AUTO DIFF WBC: CPT

## 2018-03-18 PROCEDURE — 83735 ASSAY OF MAGNESIUM: CPT

## 2018-03-18 PROCEDURE — S0028 INJECTION, FAMOTIDINE, 20 MG: HCPCS | Performed by: INTERNAL MEDICINE

## 2018-03-18 PROCEDURE — 6360000002 HC RX W HCPCS: Performed by: EMERGENCY MEDICINE

## 2018-03-18 PROCEDURE — 85018 HEMOGLOBIN: CPT

## 2018-03-18 PROCEDURE — 81003 URINALYSIS AUTO W/O SCOPE: CPT

## 2018-03-18 RX ORDER — SODIUM CHLORIDE 0.9 % (FLUSH) 0.9 %
10 SYRINGE (ML) INJECTION PRN
Status: DISCONTINUED | OUTPATIENT
Start: 2018-03-18 | End: 2018-03-21 | Stop reason: HOSPADM

## 2018-03-18 RX ORDER — OMEPRAZOLE 20 MG/1
20 CAPSULE, DELAYED RELEASE ORAL DAILY
Status: DISCONTINUED | OUTPATIENT
Start: 2018-03-18 | End: 2018-03-18 | Stop reason: CLARIF

## 2018-03-18 RX ORDER — METOPROLOL SUCCINATE 25 MG/1
25 TABLET, EXTENDED RELEASE ORAL DAILY
Status: DISCONTINUED | OUTPATIENT
Start: 2018-03-18 | End: 2018-03-21 | Stop reason: HOSPADM

## 2018-03-18 RX ORDER — ATORVASTATIN CALCIUM 40 MG/1
40 TABLET, FILM COATED ORAL DAILY
Status: DISCONTINUED | OUTPATIENT
Start: 2018-03-18 | End: 2018-03-21 | Stop reason: HOSPADM

## 2018-03-18 RX ORDER — LUBIPROSTONE 8 UG/1
8 CAPSULE, GELATIN COATED ORAL DAILY
Status: DISCONTINUED | OUTPATIENT
Start: 2018-03-18 | End: 2018-03-21 | Stop reason: HOSPADM

## 2018-03-18 RX ORDER — SODIUM CHLORIDE 0.9 % (FLUSH) 0.9 %
10 SYRINGE (ML) INJECTION EVERY 12 HOURS SCHEDULED
Status: DISCONTINUED | OUTPATIENT
Start: 2018-03-18 | End: 2018-03-21 | Stop reason: HOSPADM

## 2018-03-18 RX ORDER — PANTOPRAZOLE SODIUM 40 MG/1
40 TABLET, DELAYED RELEASE ORAL
Status: DISCONTINUED | OUTPATIENT
Start: 2018-03-18 | End: 2018-03-21 | Stop reason: HOSPADM

## 2018-03-18 RX ORDER — ACETAMINOPHEN 325 MG/1
650 TABLET ORAL EVERY 4 HOURS PRN
Status: DISCONTINUED | OUTPATIENT
Start: 2018-03-18 | End: 2018-03-21 | Stop reason: HOSPADM

## 2018-03-18 RX ORDER — MEGESTROL ACETATE 20 MG/1
20 TABLET ORAL DAILY
COMMUNITY
End: 2018-12-21

## 2018-03-18 RX ORDER — DOCUSATE SODIUM 100 MG/1
100 CAPSULE, LIQUID FILLED ORAL 2 TIMES DAILY
Status: DISCONTINUED | OUTPATIENT
Start: 2018-03-18 | End: 2018-03-21 | Stop reason: HOSPADM

## 2018-03-18 RX ORDER — DEXTROSE AND SODIUM CHLORIDE 5; .9 G/100ML; G/100ML
INJECTION, SOLUTION INTRAVENOUS CONTINUOUS
Status: DISCONTINUED | OUTPATIENT
Start: 2018-03-18 | End: 2018-03-19

## 2018-03-18 RX ORDER — OMEPRAZOLE 20 MG/1
20 CAPSULE, DELAYED RELEASE ORAL DAILY
Status: ON HOLD | COMMUNITY
End: 2018-03-21 | Stop reason: HOSPADM

## 2018-03-18 RX ORDER — ONDANSETRON 2 MG/ML
4 INJECTION INTRAMUSCULAR; INTRAVENOUS EVERY 6 HOURS PRN
Status: DISCONTINUED | OUTPATIENT
Start: 2018-03-18 | End: 2018-03-21 | Stop reason: HOSPADM

## 2018-03-18 RX ORDER — FLUTICASONE PROPIONATE 50 MCG
1 SPRAY, SUSPENSION (ML) NASAL DAILY
Status: DISCONTINUED | OUTPATIENT
Start: 2018-03-18 | End: 2018-03-21 | Stop reason: HOSPADM

## 2018-03-18 RX ORDER — ASCORBIC ACID 500 MG
500 TABLET ORAL DAILY
COMMUNITY

## 2018-03-18 RX ORDER — L. ACIDOPHILUS/L.BULGARICUS 1MM CELL
1 TABLET ORAL 2 TIMES DAILY
Status: DISCONTINUED | OUTPATIENT
Start: 2018-03-18 | End: 2018-03-21 | Stop reason: HOSPADM

## 2018-03-18 RX ADMIN — FAMOTIDINE 20 MG: 10 INJECTION INTRAVENOUS at 10:25

## 2018-03-18 RX ADMIN — DOCUSATE SODIUM 100 MG: 100 CAPSULE, LIQUID FILLED ORAL at 10:27

## 2018-03-18 RX ADMIN — DEXTROSE AND SODIUM CHLORIDE: 5; 900 INJECTION, SOLUTION INTRAVENOUS at 15:53

## 2018-03-18 RX ADMIN — RIVAROXABAN 15 MG: 15 TABLET, FILM COATED ORAL at 10:27

## 2018-03-18 RX ADMIN — PANTOPRAZOLE SODIUM 40 MG: 40 TABLET, DELAYED RELEASE ORAL at 10:27

## 2018-03-18 RX ADMIN — AZITHROMYCIN MONOHYDRATE 500 MG: 500 INJECTION, POWDER, LYOPHILIZED, FOR SOLUTION INTRAVENOUS at 04:24

## 2018-03-18 RX ADMIN — AZTREONAM 2 G: 2 INJECTION, SOLUTION INTRAVENOUS at 05:46

## 2018-03-18 RX ADMIN — LACTOBACILLUS TAB 1 TABLET: TAB at 10:27

## 2018-03-18 RX ADMIN — ATORVASTATIN CALCIUM 40 MG: 40 TABLET, FILM COATED ORAL at 10:28

## 2018-03-18 RX ADMIN — DILTIAZEM HYDROCHLORIDE 60 MG: 30 TABLET, FILM COATED ORAL at 10:27

## 2018-03-18 RX ADMIN — METOPROLOL SUCCINATE 25 MG: 25 TABLET, EXTENDED RELEASE ORAL at 10:28

## 2018-03-18 ASSESSMENT — ENCOUNTER SYMPTOMS
NAUSEA: 0
ABDOMINAL PAIN: 0
EYE REDNESS: 0
TROUBLE SWALLOWING: 0
EYE DISCHARGE: 0
CHEST TIGHTNESS: 0
WHEEZING: 0
SORE THROAT: 0
EYE ITCHING: 0
STRIDOR: 0
SINUS PRESSURE: 0
PHOTOPHOBIA: 0
VOMITING: 0
COLOR CHANGE: 0
VOICE CHANGE: 0
EYE PAIN: 0
ANAL BLEEDING: 0
BACK PAIN: 0
COUGH: 0
FACIAL SWELLING: 0
BLOOD IN STOOL: 0
SHORTNESS OF BREATH: 0
ABDOMINAL DISTENTION: 0
RHINORRHEA: 0
CONSTIPATION: 0
CHOKING: 0
DIARRHEA: 0

## 2018-03-18 NOTE — CARE COORDINATION
LSW spoke with pt regarding her DC plan. Pt has been at [facility] for skilled care and she plans to return to [facility] upon DC to complete her rehab. Pt lives at home with her son when not in rehab.

## 2018-03-18 NOTE — H&P
Internal Medicine   History and Physical    Patient's Name/Date of Birth: Purvi Del Rio / 10/9/1926 (54 y.o.)    Date: March 18, 2018     Chief Complaint: Fever    HPI:   80years old female with past medical history of hypertension, hyperlipidemia, A. fib, presents to the ED from a nursing home due to fever. Patient was sent to the hospital because of a fever and a questionable pneumonia. Patient is asked Intermatic. She denied shortness of breath or chest pain. No cough. She denied burning on urination or urinary symptoms. No blood in the urine. No nausea no vomiting no abdominal pain. In the ED, patient was normothermic on multiple checks. There was no sign of infection. She has mild leukocytosis. She was given 1 dose of antibiotic. Past Medical History:   Diagnosis Date    Anxiety     Atrial fibrillation ([address]     Bilateral carotid artery stenosis 10/27/2017    Central retinal artery occlusion, right eye 10/17/2017    Chronic constipation     Chronic venous insufficiency     Dyslipidemia     Essential hypertension     HTN (hypertension) 6/29/2013    Neurodermatitis     chronic, of bilateral lower extremities    Osteoporosis     Pap test, as part of routine gynecological examination     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 Painless. - Had CT head without contrast at OSH ED that shows: No acute intracranial abnormality. 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 - EKG shows sinus bradycardia with PAC(R-58) - Concern for retinal artery occlusion?> 12 hours have passed. - Afib related?  Carotid bruit? - BP was 210/85 initially and Panel    Collection Time: 03/18/18  1:45 AM   Result Value Ref Range    Sodium 139 132 - 144 mEq/L    Potassium 4.4 3.5 - 5.1 mEq/L    Chloride 105 98 - 107 mEq/L    CO2 19 (L) 22 - 29 mEq/L    Anion Gap 15 (H) 7 - 13 mEq/L    Glucose 113 (H) 74 - 109 mg/dL    BUN 14 8 - 23 mg/dL    CREATININE 0.41 (L) 0.50 - 0.90 mg/dL    GFR Non-African American >60.0 >60    GFR  >60.0 >60    Calcium 9.8 8.6 - 10.2 mg/dL    Total Protein 6.6 6.4 - 8.1 g/dL    Alb 4.1 3.9 - 4.9 g/dL    Total Bilirubin 0.6 0.0 - 1.2 mg/dL    Alkaline Phosphatase 112 40 - 130 U/L    ALT 15 0 - 33 U/L    AST 23 0 - 35 U/L    Globulin 2.5 2.3 - 3.5 g/dL   Urine Reflex to Culture    Collection Time: 03/18/18  2:44 AM   Result Value Ref Range    Color, UA Yellow Straw/Yellow    Clarity, UA Clear Clear    Glucose, Ur Negative Negative mg/dL    Bilirubin Urine Negative Negative    Ketones, Urine Negative Negative mg/dL    Specific Gravity, UA 1.010 1.005 - 1.030    Blood, Urine Negative Negative    pH, UA 7.0 5.0 - 9.0    Protein, UA Negative Negative mg/dL    Urobilinogen, Urine 0.2 <2.0 E.U./dL    Nitrite, Urine Negative Negative    Leukocyte Esterase, Urine Negative Negative    Urine Reflex to Culture Not Indicated      Radiology:  XR CHEST PORTABLE    (Results Pending)       Assessment/Plan:  1. Fever? No source of infection   Reported from a nursing home that she had a fever   Never documented fever in the hospital   She was given 1 dose of aztreonam   Blood cultures were sent   Given the fact that patient currently afebrile and no documented fever in the hospital and no source of infection, we will not continue antibiotic at this point until we have any evidence of infection going on. 2. Mild leukocytosis   Very mild - likely reactive and not infection but she was given 1 dose of antibiotic  3.  Hypertension   We'll resume home kenia Crawford M.D.  03/18/18

## 2018-03-18 NOTE — ED PROVIDER NOTES
problem, joint swelling, myalgias, neck pain and neck stiffness. Skin: Negative for color change, pallor, rash and wound. Allergic/Immunologic: Negative for environmental allergies and food allergies. Neurological: Negative for dizziness, tremors, seizures, syncope, facial asymmetry, speech difficulty, weakness, light-headedness, numbness and headaches. Hematological: Negative for adenopathy. Does not bruise/bleed easily. Psychiatric/Behavioral: Negative for agitation, behavioral problems, confusion, decreased concentration, dysphoric mood, hallucinations, self-injury, sleep disturbance and suicidal ideas. The patient is not nervous/anxious and is not hyperactive. Except as noted above the remainder of the review of systems was reviewed and negative. PAST MEDICAL HISTORY     Past Medical History:   Diagnosis Date    Anxiety     Atrial fibrillation ([address]     Bilateral carotid artery stenosis 10/27/2017    Central retinal artery occlusion, right eye 10/17/2017    Chronic constipation     Chronic venous insufficiency     Dyslipidemia     Essential hypertension     HTN (hypertension) 6/29/2013    Neurodermatitis     chronic, of bilateral lower extremities    Osteoporosis     Pap test, as part of routine gynecological examination     years ago   [facility] Screening mammogram     about 5 yrs ago    Seronegative rheumatoid arthritis ([address]     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 Painless. - Had CT head without contrast at OSH ED that shows: No acute intracranial abnormality. 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 - EKG shows sinus bradycardia with PAC(R-58) - Concern for retinal artery occlusion?> 12 hours have passed.  - Afib

## 2018-03-18 NOTE — ED TRIAGE NOTES
Patient sent form [facility] fever of 101, they did a chest xray yesterday that was negative, they said she coughed up some \"frothy sputum\", pt denies cough, denies pain.

## 2018-03-19 LAB
ALBUMIN SERPL-MCNC: 3.5 G/DL (ref 3.9–4.9)
ALP BLD-CCNC: 95 U/L (ref 40–130)
ALT SERPL-CCNC: 11 U/L (ref 0–33)
ANION GAP SERPL CALCULATED.3IONS-SCNC: 11 MEQ/L (ref 7–13)
ANION GAP SERPL CALCULATED.3IONS-SCNC: 16 MEQ/L (ref 7–13)
AST SERPL-CCNC: 18 U/L (ref 0–35)
BILIRUB SERPL-MCNC: 0.6 MG/DL (ref 0–1.2)
BILIRUBIN URINE: NEGATIVE
BLOOD, URINE: NEGATIVE
BUN BLDV-MCNC: 12 MG/DL (ref 8–23)
BUN BLDV-MCNC: 14 MG/DL (ref 8–23)
CALCIUM SERPL-MCNC: 8.4 MG/DL (ref 8.6–10.2)
CALCIUM SERPL-MCNC: 8.4 MG/DL (ref 8.6–10.2)
CHLORIDE BLD-SCNC: 107 MEQ/L (ref 98–107)
CHLORIDE BLD-SCNC: 109 MEQ/L (ref 98–107)
CLARITY: ABNORMAL
CO2: 19 MEQ/L (ref 22–29)
CO2: 20 MEQ/L (ref 22–29)
COLOR: YELLOW
CREAT SERPL-MCNC: 0.32 MG/DL (ref 0.5–0.9)
CREAT SERPL-MCNC: 0.41 MG/DL (ref 0.5–0.9)
GFR AFRICAN AMERICAN: >60
GFR AFRICAN AMERICAN: >60
GFR NON-AFRICAN AMERICAN: >60
GFR NON-AFRICAN AMERICAN: >60
GLOBULIN: 2.1 G/DL (ref 2.3–3.5)
GLUCOSE BLD-MCNC: 107 MG/DL (ref 74–109)
GLUCOSE BLD-MCNC: 76 MG/DL (ref 60–115)
GLUCOSE BLD-MCNC: 86 MG/DL (ref 74–109)
GLUCOSE URINE: NEGATIVE MG/DL
KETONES, URINE: NEGATIVE MG/DL
LEUKOCYTE ESTERASE, URINE: NEGATIVE
MAGNESIUM: 1.7 MG/DL (ref 1.7–2.3)
NITRITE, URINE: NEGATIVE
PERFORMED ON: NORMAL
PH UA: 7 (ref 5–9)
POTASSIUM REFLEX MAGNESIUM: 2.6 MEQ/L (ref 3.5–5.1)
POTASSIUM SERPL-SCNC: 4 MEQ/L (ref 3.5–5.1)
PROTEIN UA: ABNORMAL MG/DL
SODIUM BLD-SCNC: 139 MEQ/L (ref 132–144)
SODIUM BLD-SCNC: 143 MEQ/L (ref 132–144)
SPECIFIC GRAVITY UA: 1.01 (ref 1–1.03)
TOTAL PROTEIN: 5.6 G/DL (ref 6.4–8.1)
UROBILINOGEN, URINE: 1 E.U./DL

## 2018-03-19 PROCEDURE — 2580000003 HC RX 258: Performed by: INTERNAL MEDICINE

## 2018-03-19 PROCEDURE — S0028 INJECTION, FAMOTIDINE, 20 MG: HCPCS | Performed by: INTERNAL MEDICINE

## 2018-03-19 PROCEDURE — 81003 URINALYSIS AUTO W/O SCOPE: CPT

## 2018-03-19 PROCEDURE — 6370000000 HC RX 637 (ALT 250 FOR IP): Performed by: INTERNAL MEDICINE

## 2018-03-19 PROCEDURE — G8997 SWALLOW GOAL STATUS: HCPCS

## 2018-03-19 PROCEDURE — 92610 EVALUATE SWALLOWING FUNCTION: CPT

## 2018-03-19 PROCEDURE — 96366 THER/PROPH/DIAG IV INF ADDON: CPT

## 2018-03-19 PROCEDURE — 96367 TX/PROPH/DG ADDL SEQ IV INF: CPT

## 2018-03-19 PROCEDURE — 80053 COMPREHEN METABOLIC PANEL: CPT

## 2018-03-19 PROCEDURE — 83735 ASSAY OF MAGNESIUM: CPT

## 2018-03-19 PROCEDURE — 6360000002 HC RX W HCPCS: Performed by: INTERNAL MEDICINE

## 2018-03-19 PROCEDURE — 2500000003 HC RX 250 WO HCPCS: Performed by: INTERNAL MEDICINE

## 2018-03-19 PROCEDURE — 36415 COLL VENOUS BLD VENIPUNCTURE: CPT

## 2018-03-19 PROCEDURE — G0378 HOSPITAL OBSERVATION PER HR: HCPCS

## 2018-03-19 PROCEDURE — 96375 TX/PRO/DX INJ NEW DRUG ADDON: CPT

## 2018-03-19 PROCEDURE — 1210000000 HC MED SURG R&B

## 2018-03-19 PROCEDURE — 87086 URINE CULTURE/COLONY COUNT: CPT

## 2018-03-19 PROCEDURE — 96376 TX/PRO/DX INJ SAME DRUG ADON: CPT

## 2018-03-19 PROCEDURE — G8996 SWALLOW CURRENT STATUS: HCPCS

## 2018-03-19 RX ORDER — POTASSIUM CHLORIDE 7.45 MG/ML
10 INJECTION INTRAVENOUS
Status: DISCONTINUED | OUTPATIENT
Start: 2018-03-19 | End: 2018-03-19

## 2018-03-19 RX ORDER — DEXTROSE, SODIUM CHLORIDE, AND POTASSIUM CHLORIDE 5; .45; .15 G/100ML; G/100ML; G/100ML
INJECTION INTRAVENOUS CONTINUOUS
Status: DISCONTINUED | OUTPATIENT
Start: 2018-03-19 | End: 2018-03-21

## 2018-03-19 RX ORDER — HYDRALAZINE HYDROCHLORIDE 20 MG/ML
10 INJECTION INTRAMUSCULAR; INTRAVENOUS EVERY 6 HOURS PRN
Status: DISCONTINUED | OUTPATIENT
Start: 2018-03-19 | End: 2018-03-21 | Stop reason: HOSPADM

## 2018-03-19 RX ORDER — POTASSIUM CHLORIDE 20MEQ/15ML
60 LIQUID (ML) ORAL ONCE
Status: COMPLETED | OUTPATIENT
Start: 2018-03-19 | End: 2018-03-19

## 2018-03-19 RX ORDER — MAGNESIUM SULFATE IN WATER 40 MG/ML
2 INJECTION, SOLUTION INTRAVENOUS ONCE
Status: COMPLETED | OUTPATIENT
Start: 2018-03-19 | End: 2018-03-19

## 2018-03-19 RX ADMIN — ATORVASTATIN CALCIUM 40 MG: 40 TABLET, FILM COATED ORAL at 10:12

## 2018-03-19 RX ADMIN — POTASSIUM CHLORIDE, DEXTROSE MONOHYDRATE AND SODIUM CHLORIDE: 150; 5; 450 INJECTION, SOLUTION INTRAVENOUS at 10:39

## 2018-03-19 RX ADMIN — POTASSIUM CHLORIDE 60 MEQ: 20 SOLUTION ORAL at 13:01

## 2018-03-19 RX ADMIN — LUBIPROSTONE 8 MCG: 8 CAPSULE, GELATIN COATED ORAL at 10:12

## 2018-03-19 RX ADMIN — LACTOBACILLUS TAB 1 TABLET: TAB at 23:05

## 2018-03-19 RX ADMIN — HYDRALAZINE HYDROCHLORIDE 10 MG: 20 INJECTION INTRAMUSCULAR; INTRAVENOUS at 01:40

## 2018-03-19 RX ADMIN — DOCUSATE SODIUM 100 MG: 100 CAPSULE, LIQUID FILLED ORAL at 23:05

## 2018-03-19 RX ADMIN — METOPROLOL SUCCINATE 25 MG: 25 TABLET, EXTENDED RELEASE ORAL at 10:12

## 2018-03-19 RX ADMIN — DILTIAZEM HYDROCHLORIDE 60 MG: 30 TABLET, FILM COATED ORAL at 10:13

## 2018-03-19 RX ADMIN — DILTIAZEM HYDROCHLORIDE 60 MG: 30 TABLET, FILM COATED ORAL at 16:55

## 2018-03-19 RX ADMIN — DILTIAZEM HYDROCHLORIDE 60 MG: 30 TABLET, FILM COATED ORAL at 23:05

## 2018-03-19 RX ADMIN — FAMOTIDINE 20 MG: 10 INJECTION INTRAVENOUS at 10:12

## 2018-03-19 RX ADMIN — Medication 10 ML: at 10:24

## 2018-03-19 RX ADMIN — LACTOBACILLUS TAB 1 TABLET: TAB at 10:12

## 2018-03-19 RX ADMIN — DOCUSATE SODIUM 100 MG: 100 CAPSULE, LIQUID FILLED ORAL at 10:13

## 2018-03-19 RX ADMIN — FAMOTIDINE 20 MG: 10 INJECTION INTRAVENOUS at 23:31

## 2018-03-19 RX ADMIN — DEXTROSE AND SODIUM CHLORIDE: 5; 900 INJECTION, SOLUTION INTRAVENOUS at 01:46

## 2018-03-19 RX ADMIN — POTASSIUM CHLORIDE, DEXTROSE MONOHYDRATE AND SODIUM CHLORIDE: 150; 5; 450 INJECTION, SOLUTION INTRAVENOUS at 13:01

## 2018-03-19 RX ADMIN — RIVAROXABAN 15 MG: 15 TABLET, FILM COATED ORAL at 10:12

## 2018-03-19 RX ADMIN — FAMOTIDINE 20 MG: 10 INJECTION INTRAVENOUS at 01:40

## 2018-03-19 RX ADMIN — MAGNESIUM SULFATE HEPTAHYDRATE 2 G: 40 INJECTION, SOLUTION INTRAVENOUS at 13:01

## 2018-03-19 ASSESSMENT — PAIN SCALES - GENERAL
PAINLEVEL_OUTOF10: 0

## 2018-03-19 NOTE — FLOWSHEET NOTE
Pt coughing/spitting up blood tinged fluid. Said she has \"bad reflux\". Pt initially refused her IV protonix, but agreed to it later in the night. Pt was also given IV hydralazine for BP of 194/86. Pt is H. O.H, remains NPO for barium swallow tomm, call light in reach, will continue to monitor Electronically signed by Shmuel Mcadams RN on 3/19/2018 at 1:51 AM

## 2018-03-19 NOTE — PROGRESS NOTES
improved swallowing safety. Prognosis  Prognosis  Prognosis for safe diet advancement: good  Individuals consulted  Consulted and agree with results and recommendations: Patient;RN    Education  Patient Education Response: Verbalizes understanding    G-Code:  SLP G-Codes  Functional Limitations: Swallowing  Swallow Current Status (): At least 40 percent but less than 60 percent impaired, limited or restricted  Swallow Goal Status ():  At least 1 percent but less than 20 percent impaired, limited or restricted    Pain:  Pain Assessment  Patient Currently in Pain: No  Pain Assessment: 0-10  Pain Level: 0       Therapy Time  SLP Individual Minutes  Time In: 1055  Time Out: 1115  Minutes: 801 MARIA D Mcleod  3/19/2018 11:28 AM

## 2018-03-20 ENCOUNTER — APPOINTMENT (OUTPATIENT)
Dept: GENERAL RADIOLOGY | Age: 83
DRG: 392 | End: 2018-03-20
Payer: COMMERCIAL

## 2018-03-20 LAB
ALBUMIN SERPL-MCNC: 3.1 G/DL (ref 3.9–4.9)
ALP BLD-CCNC: 84 U/L (ref 40–130)
ALT SERPL-CCNC: 11 U/L (ref 0–33)
ANION GAP SERPL CALCULATED.3IONS-SCNC: 13 MEQ/L (ref 7–13)
AST SERPL-CCNC: 16 U/L (ref 0–35)
BASOPHILS ABSOLUTE: 0.1 K/UL (ref 0–0.2)
BASOPHILS RELATIVE PERCENT: 1 %
BILIRUB SERPL-MCNC: 0.5 MG/DL (ref 0–1.2)
BUN BLDV-MCNC: 14 MG/DL (ref 8–23)
CALCIUM SERPL-MCNC: 8.1 MG/DL (ref 8.6–10.2)
CHLORIDE BLD-SCNC: 110 MEQ/L (ref 98–107)
CO2: 17 MEQ/L (ref 22–29)
CREAT SERPL-MCNC: 0.39 MG/DL (ref 0.5–0.9)
EOSINOPHILS ABSOLUTE: 0.4 K/UL (ref 0–0.7)
EOSINOPHILS RELATIVE PERCENT: 4.8 %
GFR AFRICAN AMERICAN: >60
GFR NON-AFRICAN AMERICAN: >60
GLOBULIN: 2 G/DL (ref 2.3–3.5)
GLUCOSE BLD-MCNC: 83 MG/DL (ref 60–115)
GLUCOSE BLD-MCNC: 84 MG/DL (ref 60–115)
GLUCOSE BLD-MCNC: 88 MG/DL (ref 60–115)
GLUCOSE BLD-MCNC: 93 MG/DL (ref 74–109)
HCT VFR BLD CALC: 35.3 % (ref 37–47)
HEMOGLOBIN: 11.9 G/DL (ref 12–16)
LYMPHOCYTES ABSOLUTE: 1.3 K/UL (ref 1–4.8)
LYMPHOCYTES RELATIVE PERCENT: 14.6 %
MCH RBC QN AUTO: 31.9 PG (ref 27–31.3)
MCHC RBC AUTO-ENTMCNC: 33.8 % (ref 33–37)
MCV RBC AUTO: 94.3 FL (ref 82–100)
MONOCYTES ABSOLUTE: 0.8 K/UL (ref 0.2–0.8)
MONOCYTES RELATIVE PERCENT: 9.1 %
NEUTROPHILS ABSOLUTE: 6.1 K/UL (ref 1.4–6.5)
NEUTROPHILS RELATIVE PERCENT: 70.5 %
PDW BLD-RTO: 16.1 % (ref 11.5–14.5)
PERFORMED ON: NORMAL
PLATELET # BLD: 254 K/UL (ref 130–400)
POTASSIUM REFLEX MAGNESIUM: 4.3 MEQ/L (ref 3.5–5.1)
RBC # BLD: 3.74 M/UL (ref 4.2–5.4)
SODIUM BLD-SCNC: 140 MEQ/L (ref 132–144)
TOTAL PROTEIN: 5.1 G/DL (ref 6.4–8.1)
URINE CULTURE, ROUTINE: NORMAL
WBC # BLD: 8.7 K/UL (ref 4.8–10.8)

## 2018-03-20 PROCEDURE — 80053 COMPREHEN METABOLIC PANEL: CPT

## 2018-03-20 PROCEDURE — 92526 ORAL FUNCTION THERAPY: CPT

## 2018-03-20 PROCEDURE — 6370000000 HC RX 637 (ALT 250 FOR IP): Performed by: INTERNAL MEDICINE

## 2018-03-20 PROCEDURE — 92611 MOTION FLUOROSCOPY/SWALLOW: CPT

## 2018-03-20 PROCEDURE — G0378 HOSPITAL OBSERVATION PER HR: HCPCS

## 2018-03-20 PROCEDURE — 96376 TX/PRO/DX INJ SAME DRUG ADON: CPT

## 2018-03-20 PROCEDURE — S0028 INJECTION, FAMOTIDINE, 20 MG: HCPCS | Performed by: INTERNAL MEDICINE

## 2018-03-20 PROCEDURE — 85025 COMPLETE CBC W/AUTO DIFF WBC: CPT

## 2018-03-20 PROCEDURE — 2500000003 HC RX 250 WO HCPCS: Performed by: INTERNAL MEDICINE

## 2018-03-20 PROCEDURE — 74230 X-RAY XM SWLNG FUNCJ C+: CPT

## 2018-03-20 PROCEDURE — 1210000000 HC MED SURG R&B

## 2018-03-20 PROCEDURE — 36415 COLL VENOUS BLD VENIPUNCTURE: CPT

## 2018-03-20 RX ADMIN — LACTOBACILLUS TAB 1 TABLET: TAB at 21:36

## 2018-03-20 RX ADMIN — DOCUSATE SODIUM 100 MG: 100 CAPSULE, LIQUID FILLED ORAL at 08:35

## 2018-03-20 RX ADMIN — RIVAROXABAN 15 MG: 15 TABLET, FILM COATED ORAL at 08:35

## 2018-03-20 RX ADMIN — DILTIAZEM HYDROCHLORIDE 60 MG: 30 TABLET, FILM COATED ORAL at 08:35

## 2018-03-20 RX ADMIN — DILTIAZEM HYDROCHLORIDE 60 MG: 30 TABLET, FILM COATED ORAL at 15:16

## 2018-03-20 RX ADMIN — LUBIPROSTONE 8 MCG: 8 CAPSULE, GELATIN COATED ORAL at 08:34

## 2018-03-20 RX ADMIN — METOPROLOL SUCCINATE 25 MG: 25 TABLET, EXTENDED RELEASE ORAL at 08:34

## 2018-03-20 RX ADMIN — LACTOBACILLUS TAB 1 TABLET: TAB at 08:34

## 2018-03-20 RX ADMIN — DOCUSATE SODIUM 100 MG: 100 CAPSULE, LIQUID FILLED ORAL at 21:35

## 2018-03-20 RX ADMIN — PANTOPRAZOLE SODIUM 40 MG: 40 TABLET, DELAYED RELEASE ORAL at 06:42

## 2018-03-20 RX ADMIN — FAMOTIDINE 20 MG: 10 INJECTION INTRAVENOUS at 08:35

## 2018-03-20 RX ADMIN — ATORVASTATIN CALCIUM 40 MG: 40 TABLET, FILM COATED ORAL at 08:35

## 2018-03-20 RX ADMIN — DILTIAZEM HYDROCHLORIDE 60 MG: 30 TABLET, FILM COATED ORAL at 21:36

## 2018-03-20 RX ADMIN — FLUTICASONE PROPIONATE 1 SPRAY: 50 SPRAY, METERED NASAL at 08:37

## 2018-03-20 NOTE — PROGRESS NOTES
Pulses: +2 palpable, equal bilaterally     Labs:   Recent Labs      03/17/18   1715  03/18/18   0145  03/18/18   0712  03/20/18   0746   WBC  12.1*  13.5*   --   8.7   HGB  12.9  13.5  12.6  11.9*   HCT  37.9  38.8  37.0  35.3*   PLT  296  276   --   254     Recent Labs      03/19/18   0736  03/19/18 2020 03/20/18   0746   NA  143  139  140   K  2.6*  4.0  4.3   CL  107  109*  110*   CO2  20*  19*  17*   BUN  12  14  14   CREATININE  0.32*  0.41*  0.39*   CALCIUM  8.4*  8.4*  8.1*     Recent Labs      03/18/18   0712  03/19/18   0736  03/20/18   0746   AST  17  18  16   ALT  12  11  11   BILITOT  0.5  0.6  0.5   ALKPHOS  109  95  84     No results for input(s): INR in the last 72 hours. No results for input(s): Laird Hashimoto in the last 72 hours. Urinalysis:      Lab Results   Component Value Date    NITRU Negative 03/19/2018    WBCUA  02/13/2018    BACTERIA Many 02/13/2018    RBCUA 5-10 02/13/2018    BLOODU Negative 03/19/2018    SPECGRAV 1.011 03/19/2018    GLUCOSEU Negative 03/19/2018     Radiology:  XR CHEST PORTABLE   Final Result   NO ACTIVE LUNG DISEASE. FL MODIFIED BARIUM SWALLOW W VIDEO    (Results Pending)     Assessment/Plan:    #Fever of unknown origin      - No fevers off of Abx; no source of infection; stable for discharge in regards to this issue    #HTN      - Continue home meds    #Dysphagia      - Diet modified by speech; MBS showed some reflux and recommended GI consult; will await their opinion prior to discharge as patient may need an EGD    #Hypokalemia      - repleting electrolytes    Active Hospital Problems    Diagnosis Date Noted    Fever [R50.9] 03/18/2018      Additional work up or/and treatment plan may be added today or then after based on clinical progression. I am managing a portion of pt care. Some medical issues are handled by other specialists. Additional work up and treatment should be done in out pt setting by pt PCP and other out pt providers.

## 2018-03-20 NOTE — CARE COORDINATION
3/20/18 I MET WITH THE PT WHO IS A/O X3. I REVIEWED IMM PAGE 2. AFTER GOOD TEACH BACK SHE SIGNED THE IMM PAGE 2.  PT HAD A MBS TODAY: PUREED NECTAR THICK DIET. DR Simone Lr CONSULTED FOR C/O DYSPHAGIA AND POSS NEED FOR EGD.

## 2018-03-20 NOTE — PROCEDURES
airway, contacts the vocal folds, and is not ejected from the airway    6 Material enters the airway, passes below the vocal folds and is ejected into the larynx or out of the airway    7 Material enters the airway, passes below the vocal folds, and is not ejected from the trachea despite effort    8 Material enters the airway, passes below the vocal folds, and no effort is made to eject. Compensatory Strategies:  Effective compensatory strategies:     [] double swallow        [] multiple swallow     [] alternating solids/liquids     [x] chin tuck     [] cued throat clear     [] cued redirective cough    Pharyngeal Stage Impression:  Mild pharyngeal dysphagia characterized by a mildly delayed initiation of the pharyngeal swallow with the swallow reflex triggered at the level of the valleculae. Reduced tongue base retraction resulted in min residuals in the valleculae that mostly cleared with an independent double swallow. Pt demonstrated laryngeal penetration following presentation of thin liquid via cup that automatically cleared. Laryngeal penetration was eliminated with use of a chin tuck. No aspiration was observed. STRUCTURAL/FUNCTIONAL ANOMALIES    [x]No structural/functional anomalies were noted    []Inadequate velopharyngeal closure resulting in nasopharyngeal reflux.      [] There was presence of Zenkers Diverticulum per Radiologist     []Comments:     CERVICAL ESOPHAGEAL STAGE : []Adequate []Inadequate  []Not Assessed     []Cervical osteophytes present per Radiologist     []Structural/mechanical abnormality in cervical esophagus per Radiologist    [x] Redirection of bolus from lower esophagus into upper esophagus     Long Term Goals:  [x] Will tolerate least restrictive diet safely      []Goals to be determined after MBS      [] No Treatment indicated at this time    Short Term Goals:   Pt to be seen  1-3x/week   [x] will improve oral/motor/swallow strength/function   [x] will use safe swallow strategies with assist   [x] will improve pharyngeal swallow strength/function   [x] will tolerate diet level with no s/s of aspiration. Plan of Care:    [x] Oral/motor exercises  [] Adduction/voice/pitch exercises   [x]Dysphagia exercies   [] Mastication exercises   [x] Therapeutic meal/monitoring/feeding    [x]  Prognosis for improvements is: Good,  motivation    Pain:none, N/A   []Nursing notified    GCODES:     Swallow Current  CJ   Swallow Goal  CI     Severity Levels  CH - 0% Impaired or limited  CI - At least 1%, but less than 20%  CJ - At least 20%, but less than 40%  CK - At least 40%, but less than 60%  CL - At least 60%, but less than 80%  CM - At least 80%, but less than 100%  CN - 100% impaired or limited    Radiologist:  Available on Consult as needed, Radiology Tech    Treatment goals were discussed with the: [x] patient  [] family. The [x]  patient []  family understand the diagnosis, prognosis and plan of care. [x]  Reinforcement is needed    Thank you for your referral.  Please direct any questions or concerns to Speech Pathology. Images are available through [facility]/PACS. Please see radiologist report for additional details.     Electronically signed by MARIA D Nunez on 3/20/2018 at 2:33 PM

## 2018-03-21 ENCOUNTER — ANESTHESIA (OUTPATIENT)
Dept: OPERATING ROOM | Age: 83
DRG: 392 | End: 2018-03-21
Payer: COMMERCIAL

## 2018-03-21 ENCOUNTER — ANESTHESIA EVENT (OUTPATIENT)
Dept: OPERATING ROOM | Age: 83
DRG: 392 | End: 2018-03-21
Payer: COMMERCIAL

## 2018-03-21 VITALS
TEMPERATURE: 97.9 F | HEART RATE: 78 BPM | SYSTOLIC BLOOD PRESSURE: 151 MMHG | RESPIRATION RATE: 18 BRPM | BODY MASS INDEX: 22.46 KG/M2 | HEIGHT: 58 IN | WEIGHT: 107 LBS | DIASTOLIC BLOOD PRESSURE: 83 MMHG | OXYGEN SATURATION: 99 %

## 2018-03-21 VITALS
DIASTOLIC BLOOD PRESSURE: 78 MMHG | SYSTOLIC BLOOD PRESSURE: 136 MMHG | OXYGEN SATURATION: 98 % | RESPIRATION RATE: 14 BRPM

## 2018-03-21 PROBLEM — K44.9 HIATAL HERNIA: Status: ACTIVE | Noted: 2018-03-21

## 2018-03-21 PROBLEM — K29.70 GASTRITIS: Status: ACTIVE | Noted: 2018-03-21

## 2018-03-21 LAB
ALBUMIN SERPL-MCNC: 3.7 G/DL (ref 3.9–4.9)
ALP BLD-CCNC: 108 U/L (ref 40–130)
ALT SERPL-CCNC: 17 U/L (ref 0–33)
ANION GAP SERPL CALCULATED.3IONS-SCNC: 15 MEQ/L (ref 7–13)
AST SERPL-CCNC: 20 U/L (ref 0–35)
BASOPHILS ABSOLUTE: 0.1 K/UL (ref 0–0.2)
BASOPHILS RELATIVE PERCENT: 1 %
BILIRUB SERPL-MCNC: 0.7 MG/DL (ref 0–1.2)
BUN BLDV-MCNC: 11 MG/DL (ref 8–23)
CALCIUM SERPL-MCNC: 8.6 MG/DL (ref 8.6–10.2)
CHLORIDE BLD-SCNC: 103 MEQ/L (ref 98–107)
CO2: 20 MEQ/L (ref 22–29)
CREAT SERPL-MCNC: 0.38 MG/DL (ref 0.5–0.9)
EOSINOPHILS ABSOLUTE: 0.3 K/UL (ref 0–0.7)
EOSINOPHILS RELATIVE PERCENT: 3 %
GFR AFRICAN AMERICAN: >60
GFR NON-AFRICAN AMERICAN: >60
GLOBULIN: 2.4 G/DL (ref 2.3–3.5)
GLUCOSE BLD-MCNC: 81 MG/DL (ref 74–109)
HCT VFR BLD CALC: 37.5 % (ref 37–47)
HEMOGLOBIN: 12.9 G/DL (ref 12–16)
LYMPHOCYTES ABSOLUTE: 0.9 K/UL (ref 1–4.8)
LYMPHOCYTES RELATIVE PERCENT: 10.5 %
MAGNESIUM: 1.7 MG/DL (ref 1.7–2.3)
MCH RBC QN AUTO: 32.3 PG (ref 27–31.3)
MCHC RBC AUTO-ENTMCNC: 34.5 % (ref 33–37)
MCV RBC AUTO: 93.6 FL (ref 82–100)
MONOCYTES ABSOLUTE: 0.8 K/UL (ref 0.2–0.8)
MONOCYTES RELATIVE PERCENT: 9.3 %
NEUTROPHILS ABSOLUTE: 6.8 K/UL (ref 1.4–6.5)
NEUTROPHILS RELATIVE PERCENT: 76.2 %
PDW BLD-RTO: 15.5 % (ref 11.5–14.5)
PLATELET # BLD: 279 K/UL (ref 130–400)
POTASSIUM REFLEX MAGNESIUM: 3.4 MEQ/L (ref 3.5–5.1)
RBC # BLD: 4.01 M/UL (ref 4.2–5.4)
SODIUM BLD-SCNC: 138 MEQ/L (ref 132–144)
TOTAL PROTEIN: 6.1 G/DL (ref 6.4–8.1)
WBC # BLD: 9 K/UL (ref 4.8–10.8)

## 2018-03-21 PROCEDURE — 36415 COLL VENOUS BLD VENIPUNCTURE: CPT

## 2018-03-21 PROCEDURE — 2500000003 HC RX 250 WO HCPCS: Performed by: INTERNAL MEDICINE

## 2018-03-21 PROCEDURE — G0378 HOSPITAL OBSERVATION PER HR: HCPCS

## 2018-03-21 PROCEDURE — 0DJ08ZZ INSPECTION OF UPPER INTESTINAL TRACT, VIA NATURAL OR ARTIFICIAL OPENING ENDOSCOPIC: ICD-10-PCS | Performed by: SPECIALIST

## 2018-03-21 PROCEDURE — 87077 CULTURE AEROBIC IDENTIFY: CPT

## 2018-03-21 PROCEDURE — 3700000000 HC ANESTHESIA ATTENDED CARE: Performed by: SPECIALIST

## 2018-03-21 PROCEDURE — 83735 ASSAY OF MAGNESIUM: CPT

## 2018-03-21 PROCEDURE — 80053 COMPREHEN METABOLIC PANEL: CPT

## 2018-03-21 PROCEDURE — S0028 INJECTION, FAMOTIDINE, 20 MG: HCPCS | Performed by: INTERNAL MEDICINE

## 2018-03-21 PROCEDURE — 2580000003 HC RX 258: Performed by: ANESTHESIOLOGY

## 2018-03-21 PROCEDURE — 85025 COMPLETE CBC W/AUTO DIFF WBC: CPT

## 2018-03-21 PROCEDURE — 6370000000 HC RX 637 (ALT 250 FOR IP): Performed by: INTERNAL MEDICINE

## 2018-03-21 PROCEDURE — 3609017100 HC EGD: Performed by: SPECIALIST

## 2018-03-21 PROCEDURE — 7100000001 HC PACU RECOVERY - ADDTL 15 MIN: Performed by: SPECIALIST

## 2018-03-21 PROCEDURE — 2580000003 HC RX 258: Performed by: INTERNAL MEDICINE

## 2018-03-21 PROCEDURE — 96376 TX/PRO/DX INJ SAME DRUG ADON: CPT

## 2018-03-21 PROCEDURE — 7100000000 HC PACU RECOVERY - FIRST 15 MIN: Performed by: SPECIALIST

## 2018-03-21 PROCEDURE — 2580000003 HC RX 258: Performed by: SPECIALIST

## 2018-03-21 PROCEDURE — 6360000002 HC RX W HCPCS: Performed by: NURSE ANESTHETIST, CERTIFIED REGISTERED

## 2018-03-21 RX ORDER — SODIUM CHLORIDE 0.9 % (FLUSH) 0.9 %
10 SYRINGE (ML) INJECTION PRN
Status: DISCONTINUED | OUTPATIENT
Start: 2018-03-21 | End: 2018-03-21 | Stop reason: HOSPADM

## 2018-03-21 RX ORDER — MEPERIDINE HYDROCHLORIDE 25 MG/ML
12.5 INJECTION INTRAMUSCULAR; INTRAVENOUS; SUBCUTANEOUS EVERY 5 MIN PRN
Status: DISCONTINUED | OUTPATIENT
Start: 2018-03-21 | End: 2018-03-21 | Stop reason: HOSPADM

## 2018-03-21 RX ORDER — DIPHENHYDRAMINE HYDROCHLORIDE 50 MG/ML
12.5 INJECTION INTRAMUSCULAR; INTRAVENOUS
Status: DISCONTINUED | OUTPATIENT
Start: 2018-03-21 | End: 2018-03-21 | Stop reason: HOSPADM

## 2018-03-21 RX ORDER — ONDANSETRON 2 MG/ML
4 INJECTION INTRAMUSCULAR; INTRAVENOUS
Status: DISCONTINUED | OUTPATIENT
Start: 2018-03-21 | End: 2018-03-21 | Stop reason: HOSPADM

## 2018-03-21 RX ORDER — FENTANYL CITRATE 50 UG/ML
50 INJECTION, SOLUTION INTRAMUSCULAR; INTRAVENOUS EVERY 10 MIN PRN
Status: DISCONTINUED | OUTPATIENT
Start: 2018-03-21 | End: 2018-03-21 | Stop reason: HOSPADM

## 2018-03-21 RX ORDER — LIDOCAINE HYDROCHLORIDE 10 MG/ML
1 INJECTION, SOLUTION EPIDURAL; INFILTRATION; INTRACAUDAL; PERINEURAL
Status: DISCONTINUED | OUTPATIENT
Start: 2018-03-21 | End: 2018-03-21 | Stop reason: HOSPADM

## 2018-03-21 RX ORDER — SODIUM CHLORIDE 9 MG/ML
INJECTION, SOLUTION INTRAVENOUS CONTINUOUS
Status: DISCONTINUED | OUTPATIENT
Start: 2018-03-21 | End: 2018-03-21

## 2018-03-21 RX ORDER — POTASSIUM CHLORIDE 20MEQ/15ML
40 LIQUID (ML) ORAL ONCE
Status: COMPLETED | OUTPATIENT
Start: 2018-03-21 | End: 2018-03-21

## 2018-03-21 RX ORDER — METOCLOPRAMIDE HYDROCHLORIDE 5 MG/ML
10 INJECTION INTRAMUSCULAR; INTRAVENOUS
Status: DISCONTINUED | OUTPATIENT
Start: 2018-03-21 | End: 2018-03-21 | Stop reason: HOSPADM

## 2018-03-21 RX ORDER — PANTOPRAZOLE SODIUM 40 MG/1
40 TABLET, DELAYED RELEASE ORAL
Qty: 30 TABLET | Refills: 3 | Status: SHIPPED | OUTPATIENT
Start: 2018-03-22

## 2018-03-21 RX ORDER — SODIUM CHLORIDE 0.9 % (FLUSH) 0.9 %
10 SYRINGE (ML) INJECTION EVERY 12 HOURS SCHEDULED
Status: DISCONTINUED | OUTPATIENT
Start: 2018-03-21 | End: 2018-03-21 | Stop reason: HOSPADM

## 2018-03-21 RX ORDER — HYDROCODONE BITARTRATE AND ACETAMINOPHEN 5; 325 MG/1; MG/1
1 TABLET ORAL PRN
Status: DISCONTINUED | OUTPATIENT
Start: 2018-03-21 | End: 2018-03-21 | Stop reason: HOSPADM

## 2018-03-21 RX ORDER — PROPOFOL 10 MG/ML
INJECTION, EMULSION INTRAVENOUS PRN
Status: DISCONTINUED | OUTPATIENT
Start: 2018-03-21 | End: 2018-03-21 | Stop reason: SDUPTHER

## 2018-03-21 RX ORDER — 0.9 % SODIUM CHLORIDE 0.9 %
10 VIAL (ML) INJECTION EVERY 12 HOURS SCHEDULED
Status: DISCONTINUED | OUTPATIENT
Start: 2018-03-21 | End: 2018-03-21 | Stop reason: HOSPADM

## 2018-03-21 RX ORDER — 0.9 % SODIUM CHLORIDE 0.9 %
10 VIAL (ML) INJECTION PRN
Status: DISCONTINUED | OUTPATIENT
Start: 2018-03-21 | End: 2018-03-21 | Stop reason: HOSPADM

## 2018-03-21 RX ORDER — HYDROCODONE BITARTRATE AND ACETAMINOPHEN 5; 325 MG/1; MG/1
2 TABLET ORAL PRN
Status: DISCONTINUED | OUTPATIENT
Start: 2018-03-21 | End: 2018-03-21 | Stop reason: HOSPADM

## 2018-03-21 RX ADMIN — Medication 10 ML: at 10:00

## 2018-03-21 RX ADMIN — SODIUM CHLORIDE: 9 INJECTION, SOLUTION INTRAVENOUS at 09:53

## 2018-03-21 RX ADMIN — PROPOFOL 10 MG: 10 INJECTION, EMULSION INTRAVENOUS at 14:15

## 2018-03-21 RX ADMIN — FAMOTIDINE 20 MG: 10 INJECTION INTRAVENOUS at 09:51

## 2018-03-21 RX ADMIN — LUBIPROSTONE 8 MCG: 8 CAPSULE, GELATIN COATED ORAL at 09:51

## 2018-03-21 RX ADMIN — PROPOFOL 20 MG: 10 INJECTION, EMULSION INTRAVENOUS at 14:14

## 2018-03-21 RX ADMIN — DILTIAZEM HYDROCHLORIDE 60 MG: 30 TABLET, FILM COATED ORAL at 09:51

## 2018-03-21 RX ADMIN — METOPROLOL SUCCINATE 25 MG: 25 TABLET, EXTENDED RELEASE ORAL at 09:51

## 2018-03-21 RX ADMIN — FLUTICASONE PROPIONATE 1 SPRAY: 50 SPRAY, METERED NASAL at 10:01

## 2018-03-21 RX ADMIN — FAMOTIDINE 20 MG: 10 INJECTION INTRAVENOUS at 00:03

## 2018-03-21 RX ADMIN — PROPOFOL 20 MG: 10 INJECTION, EMULSION INTRAVENOUS at 14:13

## 2018-03-21 RX ADMIN — SODIUM CHLORIDE: 9 INJECTION, SOLUTION INTRAVENOUS at 14:01

## 2018-03-21 RX ADMIN — DOCUSATE SODIUM 100 MG: 100 CAPSULE, LIQUID FILLED ORAL at 09:51

## 2018-03-21 RX ADMIN — POTASSIUM CHLORIDE 40 MEQ: 20 SOLUTION ORAL at 18:50

## 2018-03-21 RX ADMIN — LACTOBACILLUS TAB 1 TABLET: TAB at 09:51

## 2018-03-21 ASSESSMENT — PULMONARY FUNCTION TESTS
PIF_VALUE: 0
PIF_VALUE: 1
PIF_VALUE: 0
PIF_VALUE: 1
PIF_VALUE: 0
PIF_VALUE: 0

## 2018-03-21 ASSESSMENT — PAIN - FUNCTIONAL ASSESSMENT: PAIN_FUNCTIONAL_ASSESSMENT: 0-10

## 2018-03-21 NOTE — ANESTHESIA POSTPROCEDURE EVALUATION
Department of Anesthesiology  Postprocedure Note    Patient: Sommer Chance  MRN: [id number]  YOB: 1926  Date of evaluation: 3/21/2018  Time:  2:31 PM     Procedure Summary     Date:  03/21/18 Room / Location:  [facility] OR    Anesthesia Start:  3601 Anesthesia Stop:  5391    Procedure:  EGD, LUIS TEST (N/A ) Diagnosis:  (DYSPHAGIA)    Surgeon:  Roz Owen MD Responsible Provider:  See Renee MD    Anesthesia Type:  MAC ASA Status:  3          Anesthesia Type: MAC    Leidy Phase I: Leidy Score: 4    Leidy Phase II:      Last vitals: Reviewed and per EMR flowsheets.        Anesthesia Post Evaluation    Patient location during evaluation: PACU  Patient participation: complete - patient participated  Level of consciousness: awake and awake and alert  Airway patency: patent  Nausea & Vomiting: no nausea and no vomiting  Complications: no  Cardiovascular status: blood pressure returned to baseline and hemodynamically stable  Respiratory status: acceptable and nasal cannula  Hydration status: euvolemic

## 2018-03-21 NOTE — CONSULTS
were unremarkable. Bilirubin is 0.5. White count is 8.7,  hemoglobin and hematocrit 11.9 and 35.3, platelet count is 531. IMPRESSION:  Dysphagia, probably esophageal dysphagia, possibilities  include rule out esophagitis or eosinophilic esophagitis or motility  disorder or esophageal stricture. PLAN:  To do an upper endoscopy in a.m. We will hold Xarelto for now. Vibha Jackson MD    D: 03/20/2018 16:47:09       T: 03/20/2018 17:37:28     [email or web address]  Job#: [id number]     Doc#: [id number]    CC:   MD Linda Valencia MD

## 2018-03-21 NOTE — ANESTHESIA PRE PROCEDURE
Department of Anesthesiology  Preprocedure Note       Name:  Elizabeth Russo   Age:  80 y.o.  :  10/9/1926                                          MRN:  [id number]         Date:  3/21/2018      Surgeon: Juan Galeana):  Anayeli Damon MD    Procedure: Procedure(s):  EGD ESOPHAGOGASTRODUODENOSCOPY    Medications prior to admission:   Prior to Admission medications    Medication Sig Start Date End Date Taking?  Authorizing Provider   vitamin C (ASCORBIC ACID) 500 MG tablet Take 500 mg by mouth daily   Yes Historical Provider, MD   megestrol (MEGACE) 20 MG tablet Take 20 mg by mouth daily   Yes Historical Provider, MD   omeprazole (PRILOSEC) 20 MG delayed release capsule Take 20 mg by mouth daily   Yes Historical Provider, MD   magnesium hydroxide (MILK OF MAGNESIA) 400 MG/5ML suspension Take 30 mLs by mouth daily as needed for Constipation   Yes Historical Provider, MD   aluminum-magnesium hydroxide 200-200 MG/5ML suspension Take 30 mLs by mouth every 4 hours as needed for Indigestion   Yes Historical Provider, MD   artificial tears 0.1-0.3 % SOLN ophthalmic solution Place 2 drops into both eyes 3 times daily 3/9/18  Yes Levorn January, CNP   acetaminophen (TYLENOL) 650 MG suppository Place 650 mg rectally every 4 hours as needed for Fever   Yes Historical Provider, MD   acetaminophen (TYLENOL) 325 MG tablet Take 650 mg by mouth every 4 hours as needed for Pain   Yes Historical Provider, MD   fluticasone (FLONASE) 50 MCG/ACT nasal spray 1 spray by Nasal route daily 18  Yes David Priest MD   diltiazem (CARDIZEM) 60 MG tablet Take 1 tablet by mouth 3 times daily 18  Yes Po Simmons MD   metoprolol succinate (TOPROL XL) 25 MG extended release tablet TAKE 1 TABLET BY MOUTH ONCE DAILY 18  Yes Po Simmons MD   docusate sodium (COLACE, DULCOLAX) 100 MG CAPS Take 100 mg by mouth 2 times daily 18  Yes German Solomon MD   atorvastatin (LIPITOR) 40 MG tablet Take 1 tablet by mouth daily 17 Yes Swati Barakat CNP   rivaroxaban (XARELTO) 15 MG TABS tablet Take 1 tablet by mouth daily (with breakfast) 12/6/17  Yes Elvin Reddy MD   AMITIZA 8 MCG CAPS capsule TAKE 1 CAPSULE BY MOUTH ONCE DAILY 11/28/17  Yes Lul Cortez MD   fluocinonide (LIDEX) 0.05 % cream Apply topically 2 times daily as needed (itching) 9/22/17  Yes Lul Cortez MD   Lactobacillus (PROBIOTIC ACIDOPHILUS) TABS Take 1 tablet by mouth 2 times daily 6/24/16  Yes Swati Barakat CNP   Multiple Vitamin (MULTIVITAMIN PO) Take  by mouth daily. Yes Historical Provider, MD   diltiazem (CARDIZEM CD) 180 MG ER capsule Take 1 capsule by mouth daily.  2/6/15 3/30/15  Swati Barakat CNP       Current medications:    Current Facility-Administered Medications   Medication Dose Route Frequency Provider Last Rate Last Dose    sodium chloride (PF) 0.9 % injection 10 mL  10 mL Intravenous 2 times per day Sohail Gagnon MD   10 mL at 03/21/18 1000    sodium chloride (PF) 0.9 % injection 10 mL  10 mL Intravenous PRN Sohail Gagnon MD        0.9 % sodium chloride infusion   Intravenous Continuous Kenneth España  mL/hr at 03/21/18 0953      fentaNYL (SUBLIMAZE) injection 50 mcg  50 mcg Intravenous Q10 Min PRN Marvell Buerger, MD        HYDROmorphone (DILAUDID) injection 0.5 mg  0.5 mg Intravenous Q10 Min PRN Marvell Buerger, MD        HYDROcodone-acetaminophen [facility]) 5-325 MG per tablet 1 tablet  1 tablet Oral PRN Marvell Buerger, MD        Or    HYDROcodone-acetaminophen [facility]) 5-325 MG per tablet 2 tablet  2 tablet Oral PRN Marvell Buerger, MD        diphenhydrAMINE (BENADRYL) injection 12.5 mg  12.5 mg Intravenous Once PRN Marvell Buerger, MD        ondansetron [facility]) injection 4 mg  4 mg Intravenous Once PRN Marvell Buerger, MD        metoclopramide [facility]) injection 10 mg  10 mg Intravenous Once PRN Marvell Buerger, MD        meperidine (DEMEROL) injection 12.5 mg 12.5 mg Intravenous Q5 Min PRN Jane Sandhu MD        barium sulfate 40 % suspension 50 mL  50 mL Oral ONCE PRN Amberly Herrera MD        barium sulfate 40 % reconsitutable suspension  50 g Oral ONCE PRN Amberly Herrera MD        hydrALAZINE (APRESOLINE) injection 10 mg  10 mg Intravenous Q6H PRN Serene Davis MD   10 mg at 03/19/18 0140    dextrose 5 % and 0.45 % NaCl with KCl 20 mEq infusion   Intravenous Continuous Amberly Herrera  mL/hr at 03/19/18 1301      sodium chloride flush 0.9 % injection 10 mL  10 mL Intravenous 2 times per day Serene Davis MD   10 mL at 03/19/18 1024    sodium chloride flush 0.9 % injection 10 mL  10 mL Intravenous PRN Serene Davis MD        acetaminophen (TYLENOL) tablet 650 mg  650 mg Oral Q4H PRN Serene Davis MD        magnesium hydroxide (MILK OF MAGNESIA) 400 MG/5ML suspension 30 mL  30 mL Oral Daily PRN Serene Davis MD        [facility]etron [facility]) injection 4 mg  4 mg Intravenous Q6H PRN Serene Davis MD        famotidine (PEPCID) injection 20 mg  20 mg Intravenous BID Serene Davis MD   20 mg at 03/21/18 0951    lubiprostone (AMITIZA) capsule 8 mcg  8 mcg Oral Daily Damon Wise MD   8 mcg at 03/21/18 0951    atorvastatin (LIPITOR) tablet 40 mg  40 mg Oral Daily Damon Wise MD   40 mg at 03/20/18 0835    diltiazem (CARDIZEM) tablet 60 mg  60 mg Oral TID Damon Wise MD   60 mg at 03/21/18 0951    docusate sodium (COLACE) capsule 100 mg  100 mg Oral BID Damon Wise MD   100 mg at 03/21/18 0951    fluticasone (FLONASE) 50 MCG/ACT nasal spray 1 spray  1 spray Nasal Daily Damon Wise MD   1 spray at 03/21/18 1001    lactobacillus acidophilus [facility]) 1 tablet  1 tablet Oral BID Damon Wise MD   1 tablet at 03/21/18 0951    metoprolol succinate (TOPROL XL) extended release tablet 25 mg  25 mg Oral Daily Damon Wise MD   25 mg at 03/21/18 0951    rivaroxaban (XARELTO) tablet 15 mg  15 mg Oral Daily with 115/60       NPO Status:                                                                                 BMI:   Wt Readings from Last 3 Encounters:   03/18/18 107 lb (48.5 kg)   03/09/18 107 lb (48.5 kg)   02/14/18 103 lb 13.4 oz (47.1 kg)     Body mass index is 22.36 kg/m². CBC:   Lab Results   Component Value Date    WBC 9.0 03/21/2018    RBC 4.01 03/21/2018    HGB 12.9 03/21/2018    HCT 37.5 03/21/2018    MCV 93.6 03/21/2018    RDW 15.5 03/21/2018     03/21/2018       CMP:   Lab Results   Component Value Date     03/21/2018    K 3.4 03/21/2018     03/21/2018    CO2 20 03/21/2018    BUN 11 03/21/2018    CREATININE 0.38 03/21/2018    GFRAA >60.0 03/21/2018    LABGLOM >60.0 03/21/2018    GLUCOSE 81 03/21/2018    PROT 6.1 03/21/2018    CALCIUM 8.6 03/21/2018    BILITOT 0.7 03/21/2018    ALKPHOS 108 03/21/2018    AST 20 03/21/2018    ALT 17 03/21/2018       POC Tests:   Recent Labs      03/20/18   1633   POCGLU  84       Coags:   Lab Results   Component Value Date    PROTIME 14.0 01/04/2018    INR 1.4 01/04/2018    APTT 39.2 01/04/2018       HCG (If Applicable): No results found for: PREGTESTUR, PREGSERUM, HCG, HCGQUANT     ABGs: No results found for: PHART, PO2ART, KTZ3XOS, LBD5DGZ, BEART, M3ZWZOMT     Type & Screen (If Applicable):  No results found for: [facility]    Anesthesia Evaluation  Patient summary reviewed and Nursing notes reviewed no history of anesthetic complications:   Airway: Mallampati: II  TM distance: >3 FB   Neck ROM: full  Mouth opening: > = 3 FB Dental: normal exam         Pulmonary:Negative Pulmonary ROS and normal exam                               Cardiovascular:    (+) hypertension: no interval change,       ECG reviewed               Beta Blocker:  Dose within 24 Hrs         Neuro/Psych:   Negative Neuro/Psych ROS              GI/Hepatic/Renal: Neg GI/Hepatic/Renal ROS            Endo/Other:    (+) : arthritis: rheumatoid. , .                 Abdominal: Vascular: negative vascular ROS. Anesthesia Plan      MAC     ASA 3             Anesthetic plan and risks discussed with patient. Plan discussed with CRNA.     Attending anesthesiologist reviewed and agrees with Pre Eval content              Kaya Lacy MD   3/21/2018

## 2018-03-21 NOTE — PROGRESS NOTES
Hospitalist Progress Note      PCP: Stephen Rascon CNP    Date of Admission: 3/18/2018    Chief Complaint:    Chief Complaint   Patient presents with    Fever     98.5 on arrival     Subjective:  Patient denies fevers, chills, sweats, SOB, diarrhea, burning micturition. 12 point ROS negative other than mentioned above     Medications:  Reviewed    Infusion Medications     Scheduled Medications    sodium chloride (PF)  10 mL Intravenous 2 times per day    sodium chloride flush  10 mL Intravenous 2 times per day    potassium chloride  40 mEq Oral Once    sodium chloride flush  10 mL Intravenous 2 times per day    lubiprostone  8 mcg Oral Daily    atorvastatin  40 mg Oral Daily    diltiazem  60 mg Oral TID    docusate sodium  100 mg Oral BID    fluticasone  1 spray Nasal Daily    lactobacillus acidophilus  1 tablet Oral BID    metoprolol succinate  25 mg Oral Daily    rivaroxaban  15 mg Oral Daily with breakfast    pantoprazole  40 mg Oral QAM AC     PRN Meds: sodium chloride (PF), fentanNYL, HYDROmorphone, HYDROcodone 5 mg - acetaminophen **OR** HYDROcodone 5 mg - acetaminophen, diphenhydrAMINE, ondansetron, metoclopramide, meperidine, sodium chloride flush, lidocaine PF, naphazoline-pheniramine, barium sulfate, barium sulfate, hydrALAZINE, sodium chloride flush, acetaminophen, magnesium hydroxide, ondansetron      Intake/Output Summary (Last 24 hours) at 03/21/18 1512  Last data filed at 03/21/18 1500   Gross per 24 hour   Intake              200 ml   Output                0 ml   Net              200 ml       Exam:    BP (!) 142/70   Pulse 66   Temp 98.5 °F (36.9 °C) (Temporal)   Resp 15   Ht 4' 10\" (1.473 m)   Wt 107 lb (48.5 kg)   SpO2 100%   BMI 22.36 kg/m²     General appearance: No apparent distress, appears stated age and cooperative. HEENT: Pupils equal, round, and reactive to light. Conjunctivae/corneas clear. Neck: Supple, with full range of motion. No jugular venous distention. Trachea midline. Respiratory:  Normal respiratory effort. Clear to auscultation, bilaterally without Rales/Wheezes/Rhonchi. Cardiovascular: Regular rate and rhythm with normal S1/S2 without murmurs, rubs or gallops. Abdomen: Soft, non-tender, non-distended with normal bowel sounds. Musculoskeletal: No clubbing, cyanosis or edema bilaterally. Neuro: Non Focal.   Capillary Refill: Brisk,< 3 seconds   Peripheral Pulses: +2 palpable, equal bilaterally     Labs:   Recent Labs      03/20/18   0746  03/21/18   0646   WBC  8.7  9.0   HGB  11.9*  12.9   HCT  35.3*  37.5   PLT  254  279     Recent Labs      03/19/18 2020 03/20/18   0746  03/21/18   0645   NA  139  140  138   K  4.0  4.3  3.4*   CL  109*  110*  103   CO2  19*  17*  20*   BUN  14  14  11   CREATININE  0.41*  0.39*  0.38*   CALCIUM  8.4*  8.1*  8.6     Recent Labs      03/19/18   0736  03/20/18   0746  03/21/18   0645   AST  18  16  20   ALT  11  11  17   BILITOT  0.6  0.5  0.7   ALKPHOS  95  84  108     No results for input(s): INR in the last 72 hours. No results for input(s): Luis Miguel Lima in the last 72 hours. Urinalysis:      Lab Results   Component Value Date    NITRU Negative 03/19/2018    WBCUA  02/13/2018    BACTERIA Many 02/13/2018    RBCUA 5-10 02/13/2018    BLOODU Negative 03/19/2018    SPECGRAV 1.011 03/19/2018    GLUCOSEU Negative 03/19/2018     Radiology:  FL MODIFIED BARIUM SWALLOW W VIDEO   Final Result      THERE WAS PENETRATION WITHOUT ASPIRATION. RECOMMENDATION BY SPEECH PATHOLOGY TO FOLLOW. XR CHEST PORTABLE   Final Result   NO ACTIVE LUNG DISEASE.                     Assessment/Plan:    #Fever of unknown origin      - No fevers off of Abx; no source of infection; stable for discharge in regards to this issue    #HTN      - Continue home meds    #Dysphagia      - Diet modified by speech    #Gastritis and hiatal hernia      - s/p EGD; continue PPI on discharge    #Hypokalemia      - repleting electrolytes    Active

## 2018-03-21 NOTE — PROGRESS NOTES
of motion. No jugular venous distention. Trachea midline. Respiratory:  Normal respiratory effort. Clear to auscultation, bilaterally without Rales/Wheezes/Rhonchi. Cardiovascular: Regular rate and rhythm with normal S1/S2 without murmurs, rubs or gallops. Abdomen: Soft, non-tender, non-distended with normal bowel sounds. Musculoskeletal: No clubbing, cyanosis or edema bilaterally. Neuro: Non Focal.   Capillary Refill: Brisk,< 3 seconds   Peripheral Pulses: +2 palpable, equal bilaterally     Labs:   Recent Labs      03/20/18   0746  03/21/18   0646   WBC  8.7  9.0   HGB  11.9*  12.9   HCT  35.3*  37.5   PLT  254  279     Recent Labs      03/19/18 2020 03/20/18   0746  03/21/18   0645   NA  139  140  138   K  4.0  4.3  3.4*   CL  109*  110*  103   CO2  19*  17*  20*   BUN  14  14  11   CREATININE  0.41*  0.39*  0.38*   CALCIUM  8.4*  8.1*  8.6     Recent Labs      03/19/18   0736  03/20/18   0746  03/21/18   0645   AST  18  16  20   ALT  11  11  17   BILITOT  0.6  0.5  0.7   ALKPHOS  95  84  108     No results for input(s): INR in the last 72 hours. No results for input(s): Luane Sebastian in the last 72 hours. Urinalysis:      Lab Results   Component Value Date    NITRU Negative 03/19/2018    WBCUA  02/13/2018    BACTERIA Many 02/13/2018    RBCUA 5-10 02/13/2018    BLOODU Negative 03/19/2018    SPECGRAV 1.011 03/19/2018    GLUCOSEU Negative 03/19/2018     Radiology:  FL MODIFIED BARIUM SWALLOW W VIDEO   Preliminary Result      THERE WAS PENETRATION WITHOUT ASPIRATION. RECOMMENDATION BY SPEECH PATHOLOGY TO FOLLOW. XR CHEST PORTABLE   Final Result   NO ACTIVE LUNG DISEASE.                     Assessment/Plan:    #Fever of unknown origin      - No fevers off of Abx; no source of infection; stable for discharge in regards to this issue    #HTN      - Continue home meds    #Dysphagia      - Diet modified by speech; MBS showed some reflux and recommended GI consult; plan for EGD today      - If

## 2018-03-22 ENCOUNTER — OFFICE VISIT (OUTPATIENT)
Dept: GERIATRIC MEDICINE | Age: 83
End: 2018-03-22
Payer: COMMERCIAL

## 2018-03-22 DIAGNOSIS — J15.9 PNEUMONIA, BACTERIAL: Primary | ICD-10-CM

## 2018-03-22 DIAGNOSIS — R53.1 WEAKNESS: ICD-10-CM

## 2018-03-22 DIAGNOSIS — R63.4 WEIGHT LOSS: ICD-10-CM

## 2018-03-22 LAB — CLOTEST: NEGATIVE

## 2018-03-22 PROCEDURE — 99304 1ST NF CARE SF/LOW MDM 25: CPT | Performed by: INTERNAL MEDICINE

## 2018-03-22 PROCEDURE — 1123F ACP DISCUSS/DSCN MKR DOCD: CPT | Performed by: INTERNAL MEDICINE

## 2018-03-22 PROCEDURE — G8482 FLU IMMUNIZE ORDER/ADMIN: HCPCS | Performed by: INTERNAL MEDICINE

## 2018-03-22 RX ORDER — ACETAMINOPHEN 650 MG/1
650 SUPPOSITORY RECTAL EVERY 4 HOURS PRN
COMMUNITY

## 2018-03-22 NOTE — DISCHARGE SUMMARY
suspension  Take 30 mLs by mouth daily as needed for Constipation             megestrol (MEGACE) 20 MG tablet  Take 20 mg by mouth daily             metoprolol succinate (TOPROL XL) 25 MG extended release tablet  TAKE 1 TABLET BY MOUTH ONCE DAILY             Multiple Vitamin (MULTIVITAMIN PO)  Take  by mouth daily. pantoprazole (PROTONIX) 40 MG tablet  Take 1 tablet by mouth every morning (before breakfast)             rivaroxaban (XARELTO) 15 MG TABS tablet  Take 1 tablet by mouth daily (with breakfast)             vitamin C (ASCORBIC ACID) 500 MG tablet  Take 500 mg by mouth daily                 Disposition:   If discharged to Home, Any [place] [id number] needs that were indicated and/or required as been addressed and set up by Social Work. Condition at discharge: Pt was medically stable at the time of discharge. Activity: activity as tolerated    Total time taken for discharging this patient: 40 minutes. Greater than 70% of time was spent focused exclusively on this patient. Time was taken to review chart, discuss plans with consultants, reconciling medications, discussing plan answering questions with patient.      Ana Lees  3/22/2018, 7:22 PM  ----------------------------------------------------------------------------------------------------------------------    Mary Coy

## 2018-03-23 LAB
ANION GAP SERPL CALCULATED.3IONS-SCNC: 14 MEQ/L (ref 7–13)
BLOOD CULTURE, ROUTINE: NORMAL
BUN BLDV-MCNC: 12 MG/DL (ref 8–23)
CALCIUM SERPL-MCNC: 8.2 MG/DL (ref 8.6–10.2)
CHLORIDE BLD-SCNC: 105 MEQ/L (ref 98–107)
CO2: 19 MEQ/L (ref 22–29)
CREAT SERPL-MCNC: 0.41 MG/DL (ref 0.5–0.9)
CULTURE, BLOOD 2: NORMAL
GFR AFRICAN AMERICAN: >60
GFR NON-AFRICAN AMERICAN: >60
GLUCOSE BLD-MCNC: 79 MG/DL (ref 74–109)
HCT VFR BLD CALC: 34.9 % (ref 37–47)
HEMOGLOBIN: 12.3 G/DL (ref 12–16)
MCH RBC QN AUTO: 32.6 PG (ref 27–31.3)
MCHC RBC AUTO-ENTMCNC: 35.2 % (ref 33–37)
MCV RBC AUTO: 92.5 FL (ref 82–100)
PDW BLD-RTO: 15.7 % (ref 11.5–14.5)
PLATELET # BLD: 226 K/UL (ref 130–400)
POTASSIUM SERPL-SCNC: 3.4 MEQ/L (ref 3.5–5.1)
PREALBUMIN: 17.9 MG/DL (ref 20–40)
RBC # BLD: 3.78 M/UL (ref 4.2–5.4)
SODIUM BLD-SCNC: 138 MEQ/L (ref 132–144)
WBC # BLD: 6 K/UL (ref 4.8–10.8)

## 2018-03-28 ENCOUNTER — OFFICE VISIT (OUTPATIENT)
Dept: GERIATRIC MEDICINE | Age: 83
End: 2018-03-28
Payer: COMMERCIAL

## 2018-03-28 DIAGNOSIS — K44.9 HIATAL HERNIA: ICD-10-CM

## 2018-03-28 DIAGNOSIS — R06.2 WHEEZING: ICD-10-CM

## 2018-03-28 DIAGNOSIS — R26.81 UNSTEADY GAIT: Primary | ICD-10-CM

## 2018-03-28 DIAGNOSIS — R33.9 URINARY RETENTION: ICD-10-CM

## 2018-03-28 PROCEDURE — 1123F ACP DISCUSS/DSCN MKR DOCD: CPT | Performed by: NURSE PRACTITIONER

## 2018-03-28 PROCEDURE — 99309 SBSQ NF CARE MODERATE MDM 30: CPT | Performed by: NURSE PRACTITIONER

## 2018-03-28 RX ORDER — ALBUTEROL SULFATE 2.5 MG/3ML
2.5 SOLUTION RESPIRATORY (INHALATION) 3 TIMES DAILY
Qty: 120 EACH | Refills: 3
Start: 2018-03-28 | End: 2018-12-21

## 2018-03-28 ASSESSMENT — ENCOUNTER SYMPTOMS
NAUSEA: 0
ABDOMINAL PAIN: 0
COUGH: 1
SHORTNESS OF BREATH: 1
WHEEZING: 0
CONSTIPATION: 1

## 2018-03-28 NOTE — PROGRESS NOTES
Height: 4' 10\" (1.473 m)     Physical Exam   Constitutional: She appears malnourished. She appears unhealthy. No distress. Cardiovascular: Normal rate, regular rhythm and normal heart sounds. No lower extremity edema   Pulmonary/Chest: Effort normal. She has wheezes. Abdominal: Soft. Bowel sounds are normal. There is no tenderness. Musculoskeletal:        Right wrist: She exhibits decreased range of motion. Right arm brace   Psychiatric: Affect normal. Her mood appears anxious. She exhibits a depressed mood. Assessment & Plan   1. Unsteady gait      Monitoring; using platform walker. Continue PT/OT   2. Hiatal hernia      Monitoring; on protonix. 3. Urinary retention      Improved, voiding ok without cruz   4. Wheezing  albuterol (PROVENTIL) (2.5 MG/3ML) 0.083% nebulizer solution    New; add albuterol routinely x 5 days then prn     Orders Placed This Encounter   Medications    albuterol (PROVENTIL) (2.5 MG/3ML) 0.083% nebulizer solution     Sig: Take 3 mLs by nebulization 3 times daily for 5 days     Dispense:  120 each     Refill:  3     Medications reviewed and updated  Encourage fluid intake, exercise as tolerated, and good nutrition. Continue therapies, and continue to stress fall prevention strategies. Return for follow up as needed while in skilled rehab.     Vahid January, CNP

## 2018-03-29 VITALS
SYSTOLIC BLOOD PRESSURE: 138 MMHG | HEIGHT: 58 IN | HEART RATE: 88 BPM | BODY MASS INDEX: 20.57 KG/M2 | WEIGHT: 98 LBS | OXYGEN SATURATION: 96 % | TEMPERATURE: 97.6 F | DIASTOLIC BLOOD PRESSURE: 78 MMHG | RESPIRATION RATE: 18 BRPM

## 2018-04-02 LAB
ANION GAP SERPL CALCULATED.3IONS-SCNC: 12 MEQ/L (ref 7–13)
BUN BLDV-MCNC: 15 MG/DL (ref 8–23)
CALCIUM SERPL-MCNC: 8.7 MG/DL (ref 8.6–10.2)
CHLORIDE BLD-SCNC: 105 MEQ/L (ref 98–107)
CO2: 22 MEQ/L (ref 22–29)
CREAT SERPL-MCNC: 0.47 MG/DL (ref 0.5–0.9)
GFR AFRICAN AMERICAN: >60
GFR NON-AFRICAN AMERICAN: >60
GLUCOSE BLD-MCNC: 81 MG/DL (ref 74–109)
HCT VFR BLD CALC: 34.9 % (ref 37–47)
HEMOGLOBIN: 12 G/DL (ref 12–16)
MCH RBC QN AUTO: 32.3 PG (ref 27–31.3)
MCHC RBC AUTO-ENTMCNC: 34.5 % (ref 33–37)
MCV RBC AUTO: 93.6 FL (ref 82–100)
PDW BLD-RTO: 15.8 % (ref 11.5–14.5)
PLATELET # BLD: 232 K/UL (ref 130–400)
POTASSIUM SERPL-SCNC: 4.5 MEQ/L (ref 3.5–5.1)
RBC # BLD: 3.72 M/UL (ref 4.2–5.4)
SODIUM BLD-SCNC: 139 MEQ/L (ref 132–144)
WBC # BLD: 10.2 K/UL (ref 4.8–10.8)

## 2018-04-04 VITALS — HEART RATE: 82 BPM | TEMPERATURE: 96.1 F | DIASTOLIC BLOOD PRESSURE: 60 MMHG | SYSTOLIC BLOOD PRESSURE: 113 MMHG

## 2018-04-04 LAB
ANION GAP SERPL CALCULATED.3IONS-SCNC: 12 MEQ/L (ref 7–13)
BASOPHILS ABSOLUTE: 0.1 K/UL (ref 0–0.2)
BASOPHILS RELATIVE PERCENT: 0.6 %
BUN BLDV-MCNC: 19 MG/DL (ref 8–23)
CALCIUM SERPL-MCNC: 8.6 MG/DL (ref 8.6–10.2)
CHLORIDE BLD-SCNC: 106 MEQ/L (ref 98–107)
CO2: 19 MEQ/L (ref 22–29)
CREAT SERPL-MCNC: 0.44 MG/DL (ref 0.5–0.9)
EOSINOPHILS ABSOLUTE: 0.3 K/UL (ref 0–0.7)
EOSINOPHILS RELATIVE PERCENT: 2.9 %
GFR AFRICAN AMERICAN: >60
GFR NON-AFRICAN AMERICAN: >60
GLUCOSE BLD-MCNC: 71 MG/DL (ref 74–109)
HCT VFR BLD CALC: 36.5 % (ref 37–47)
HEMOGLOBIN: 12.3 G/DL (ref 12–16)
LYMPHOCYTES ABSOLUTE: 1.5 K/UL (ref 1–4.8)
LYMPHOCYTES RELATIVE PERCENT: 15.5 %
MCH RBC QN AUTO: 32.2 PG (ref 27–31.3)
MCHC RBC AUTO-ENTMCNC: 33.8 % (ref 33–37)
MCV RBC AUTO: 95.3 FL (ref 82–100)
MONOCYTES ABSOLUTE: 0.9 K/UL (ref 0.2–0.8)
MONOCYTES RELATIVE PERCENT: 9.3 %
NEUTROPHILS ABSOLUTE: 7 K/UL (ref 1.4–6.5)
NEUTROPHILS RELATIVE PERCENT: 71.7 %
PDW BLD-RTO: 15.8 % (ref 11.5–14.5)
PLATELET # BLD: 252 K/UL (ref 130–400)
POTASSIUM SERPL-SCNC: 4.7 MEQ/L (ref 3.5–5.1)
RBC # BLD: 3.83 M/UL (ref 4.2–5.4)
SODIUM BLD-SCNC: 137 MEQ/L (ref 132–144)
WBC # BLD: 9.8 K/UL (ref 4.8–10.8)

## 2018-04-09 ENCOUNTER — OFFICE VISIT (OUTPATIENT)
Dept: GERIATRIC MEDICINE | Age: 83
End: 2018-04-09
Payer: COMMERCIAL

## 2018-04-09 DIAGNOSIS — J06.9 VIRAL URI: ICD-10-CM

## 2018-04-09 DIAGNOSIS — I10 ESSENTIAL HYPERTENSION: ICD-10-CM

## 2018-04-09 DIAGNOSIS — M15.9 PRIMARY OSTEOARTHRITIS INVOLVING MULTIPLE JOINTS: Primary | ICD-10-CM

## 2018-04-09 DIAGNOSIS — R63.4 WEIGHT LOSS: ICD-10-CM

## 2018-04-09 PROCEDURE — 1123F ACP DISCUSS/DSCN MKR DOCD: CPT | Performed by: INTERNAL MEDICINE

## 2018-04-09 PROCEDURE — 99308 SBSQ NF CARE LOW MDM 20: CPT | Performed by: INTERNAL MEDICINE

## 2018-04-23 VITALS — SYSTOLIC BLOOD PRESSURE: 134 MMHG | HEART RATE: 82 BPM | DIASTOLIC BLOOD PRESSURE: 72 MMHG | TEMPERATURE: 97.8 F

## 2018-07-27 ENCOUNTER — OFFICE VISIT (OUTPATIENT)
Dept: GERIATRIC MEDICINE | Age: 83
End: 2018-07-27
Payer: MEDICARE

## 2018-07-27 DIAGNOSIS — E78.2 MIXED HYPERLIPIDEMIA: ICD-10-CM

## 2018-07-27 DIAGNOSIS — I10 ESSENTIAL HYPERTENSION: ICD-10-CM

## 2018-07-27 DIAGNOSIS — I87.2 VENOUS INSUFFICIENCY: ICD-10-CM

## 2018-07-27 DIAGNOSIS — I48.19 PERSISTENT ATRIAL FIBRILLATION (HCC): Primary | ICD-10-CM

## 2018-07-27 PROCEDURE — 99308 SBSQ NF CARE LOW MDM 20: CPT | Performed by: NURSE PRACTITIONER

## 2018-07-27 PROCEDURE — 1101F PT FALLS ASSESS-DOCD LE1/YR: CPT | Performed by: NURSE PRACTITIONER

## 2018-07-27 PROCEDURE — 1123F ACP DISCUSS/DSCN MKR DOCD: CPT | Performed by: NURSE PRACTITIONER

## 2018-07-30 NOTE — PROGRESS NOTES
at OSH ED that shows: No acute intracranial abnormality. 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 - EKG shows sinus bradycardia with PAC(R-58) - Concern for retinal artery occlusion?> 12 hours have passed. - Afib related? Carotid bruit? - BP was 210/85 initially and then came down to 160/69 -Normal CRP - No other acute neuro deficit seen. -Spoke to Optho on GFMF([id number])- alon Villalta and he will arrange for pt to be seen today at [facility]. No acute intervention needed as pt well out of time frame(as per optho). Plan: - Admit to [facility] consult. Await optho input and consider further rx options after etiology is confirmed. - Fall precautions - Neuro checks - ECHO - Carotid doppler  Will monitor.  Vitamin D deficiency        PHYSICAL EXAMINATION: Vital signs are stable. See Epic note. He is very thin and frail. She is awake and alert. She had a walker. She was able to converse with me. She is oriented x2. She is conversational, answers questions appropriately. Hands and wrist, lots of bony deformity from her osteoarthritis with stiffness, but no pain. Heart was actually regular rate. Lungs were clear to auscultation. ABDOMEN: Positive bowel sounds. No hepatomegaly. Lower extremities had no edema in the ankles. ASSESSMENT AND PLAN:   1. Atrial fibrillation with good rate. 2.  Venous insufficiency and ulcer healing per patient. 3.  Hypertension. She is on diltiazem. Her blood pressures are in the goal.  4.  Hyperlipidemia, on statin, appears to be tolerated, but we will watch her appetite and weight loss or any GI symptoms. She is not having any myalgias, though that we feel it may be related to the statin. POC labs reviewed. We will continue to follow monthly.           Electronically Signed By: FATMATA Sanders GNP-BC on 07/30/2018 08:37:20  ______________________________  FATMATA Sanders GNP-BC  /[id number]  D: 07/29/2018 12:24:20  T:

## 2018-08-17 ENCOUNTER — OFFICE VISIT (OUTPATIENT)
Dept: GERIATRIC MEDICINE | Age: 83
End: 2018-08-17
Payer: MEDICARE

## 2018-08-17 DIAGNOSIS — I48.20 CHRONIC ATRIAL FIBRILLATION (HCC): Primary | ICD-10-CM

## 2018-08-17 DIAGNOSIS — I10 HYPERTENSION, UNSPECIFIED TYPE: ICD-10-CM

## 2018-08-17 DIAGNOSIS — M15.9 OSTEOARTHRITIS OF MULTIPLE JOINTS, UNSPECIFIED OSTEOARTHRITIS TYPE: ICD-10-CM

## 2018-08-17 PROCEDURE — 99309 SBSQ NF CARE MODERATE MDM 30: CPT | Performed by: INTERNAL MEDICINE

## 2018-08-17 PROCEDURE — 1101F PT FALLS ASSESS-DOCD LE1/YR: CPT | Performed by: INTERNAL MEDICINE

## 2018-08-17 PROCEDURE — 1123F ACP DISCUSS/DSCN MKR DOCD: CPT | Performed by: INTERNAL MEDICINE

## 2018-09-28 ENCOUNTER — OFFICE VISIT (OUTPATIENT)
Dept: GERIATRIC MEDICINE | Age: 83
End: 2018-09-28
Payer: MEDICARE

## 2018-09-28 DIAGNOSIS — E78.5 DYSLIPIDEMIA: Chronic | ICD-10-CM

## 2018-09-28 DIAGNOSIS — I10 ESSENTIAL HYPERTENSION: Chronic | ICD-10-CM

## 2018-09-28 DIAGNOSIS — I87.2 VENOUS INSUFFICIENCY: Primary | ICD-10-CM

## 2018-09-28 PROCEDURE — 99308 SBSQ NF CARE LOW MDM 20: CPT | Performed by: NURSE PRACTITIONER

## 2018-09-28 PROCEDURE — 1101F PT FALLS ASSESS-DOCD LE1/YR: CPT | Performed by: NURSE PRACTITIONER

## 2018-09-28 PROCEDURE — 1123F ACP DISCUSS/DSCN MKR DOCD: CPT | Performed by: NURSE PRACTITIONER

## 2018-10-22 ENCOUNTER — OFFICE VISIT (OUTPATIENT)
Dept: GERIATRIC MEDICINE | Age: 83
End: 2018-10-22
Payer: MEDICARE

## 2018-10-22 DIAGNOSIS — R62.7 ADULT FAILURE TO THRIVE: ICD-10-CM

## 2018-10-22 DIAGNOSIS — I10 ESSENTIAL HYPERTENSION: Primary | ICD-10-CM

## 2018-10-22 DIAGNOSIS — G47.00 INSOMNIA, UNSPECIFIED TYPE: ICD-10-CM

## 2018-10-22 PROCEDURE — G8484 FLU IMMUNIZE NO ADMIN: HCPCS | Performed by: INTERNAL MEDICINE

## 2018-10-22 PROCEDURE — 99309 SBSQ NF CARE MODERATE MDM 30: CPT | Performed by: INTERNAL MEDICINE

## 2018-10-22 PROCEDURE — 1101F PT FALLS ASSESS-DOCD LE1/YR: CPT | Performed by: INTERNAL MEDICINE

## 2018-10-22 PROCEDURE — 1123F ACP DISCUSS/DSCN MKR DOCD: CPT | Performed by: INTERNAL MEDICINE

## 2018-10-22 NOTE — PROGRESS NOTES
shows: No acute intracranial abnormality. 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 - EKG shows sinus bradycardia with PAC(R-58) - Concern for retinal artery occlusion?> 12 hours have passed. - Afib related? Carotid bruit? - BP was 210/85 initially and then came down to 160/69 -Normal CRP - No other acute neuro deficit seen. -Spoke to Optho on IRUO([id number])- alon Villalta and he will arrange for pt to be seen today at [facility]. No acute intervention needed as pt well out of time frame(as per optho). Plan: - Admit to [facility] consult. Await optho input and consider further rx options after etiology is confirmed. - Fall precautions - Neuro checks - ECHO - Carotid doppler  Will monitor.  Vitamin D deficiency        PHYSICAL EXAMINATION: Vital signs are stable, see Epic note. She is sitting up in the chair. She is awake and alert. She is thin, frail. Buccal mucosa is moist and pink. Heart was regular rate. No RVR. Lungs are clear to auscultation, unlabored. Abdomen: Nontender, nondistended. Lower Extremities: She does have some ankle edema only. ASSESSMENT AND PLAN:   1. Venous insufficiency: Edema is under fairly good control. No issues. 2.  Hypertension: Blood pressure is in goal.  3.  Hyperlipidemia, on statin, being tolerated. No myalgias. POC, labs reviewed. We will continue to follow monthly.           Electronically Signed By: FATMATA Dawson, GNP-BC on 10/12/2018 11:23:28  ______________________________  FATMATA Dawson, GNP-BC  /[id number]  D: 10/10/2018 23:33:22  T: 10/11/2018 14:57:10    cc: [facility]

## 2018-10-28 PROBLEM — L02.415 CELLULITIS AND ABSCESS OF RIGHT LOWER EXTREMITY: Status: RESOLVED | Noted: 2018-01-04 | Resolved: 2018-10-28

## 2018-10-28 PROBLEM — L03.115 CELLULITIS AND ABSCESS OF RIGHT LOWER EXTREMITY: Status: RESOLVED | Noted: 2018-01-04 | Resolved: 2018-10-28

## 2018-11-06 ENCOUNTER — OFFICE VISIT (OUTPATIENT)
Dept: GERIATRIC MEDICINE | Age: 83
End: 2018-11-06
Payer: MEDICARE

## 2018-11-06 DIAGNOSIS — R60.0 EDEMA LEG: Primary | ICD-10-CM

## 2018-11-06 PROCEDURE — 99309 SBSQ NF CARE MODERATE MDM 30: CPT | Performed by: NURSE PRACTITIONER

## 2018-11-06 PROCEDURE — 1123F ACP DISCUSS/DSCN MKR DOCD: CPT | Performed by: NURSE PRACTITIONER

## 2018-11-06 PROCEDURE — 1101F PT FALLS ASSESS-DOCD LE1/YR: CPT | Performed by: NURSE PRACTITIONER

## 2018-11-06 PROCEDURE — G8484 FLU IMMUNIZE NO ADMIN: HCPCS | Performed by: NURSE PRACTITIONER

## 2018-11-07 ENCOUNTER — OFFICE VISIT (OUTPATIENT)
Dept: GERIATRIC MEDICINE | Age: 83
End: 2018-11-07
Payer: MEDICARE

## 2018-11-07 DIAGNOSIS — J98.11 ATELECTASIS: Primary | ICD-10-CM

## 2018-11-07 DIAGNOSIS — R60.0 LOCALIZED EDEMA: ICD-10-CM

## 2018-11-07 PROCEDURE — 99308 SBSQ NF CARE LOW MDM 20: CPT | Performed by: NURSE PRACTITIONER

## 2018-11-07 PROCEDURE — G8484 FLU IMMUNIZE NO ADMIN: HCPCS | Performed by: NURSE PRACTITIONER

## 2018-11-07 PROCEDURE — 1123F ACP DISCUSS/DSCN MKR DOCD: CPT | Performed by: NURSE PRACTITIONER

## 2018-11-07 PROCEDURE — 1101F PT FALLS ASSESS-DOCD LE1/YR: CPT | Performed by: NURSE PRACTITIONER

## 2018-11-13 LAB
BASOPHILS ABSOLUTE: 0.1 /ΜL
BASOPHILS RELATIVE PERCENT: 0.7 %
BUN BLDV-MCNC: 22 MG/DL
CALCIUM SERPL-MCNC: 8.9 MG/DL
CHLORIDE BLD-SCNC: 103 MMOL/L
CO2: 25 MMOL/L
CREAT SERPL-MCNC: 0.8 MG/DL
EOSINOPHILS ABSOLUTE: 0.3 /ΜL
EOSINOPHILS RELATIVE PERCENT: 3.7 %
GFR CALCULATED: NORMAL
GLUCOSE BLD-MCNC: 82 MG/DL
HCT VFR BLD CALC: 34.5 % (ref 36–46)
HEMOGLOBIN: 12.3 G/DL (ref 12–16)
LYMPHOCYTES ABSOLUTE: 1.1 /ΜL
LYMPHOCYTES RELATIVE PERCENT: 14.8 %
MCH RBC QN AUTO: 32.6 PG
MCHC RBC AUTO-ENTMCNC: 35.8 G/DL
MCV RBC AUTO: 81.1 FL
MONOCYTES ABSOLUTE: 0.7 /ΜL
MONOCYTES RELATIVE PERCENT: 10.4 %
NEUTROPHILS ABSOLUTE: 5 /ΜL
NEUTROPHILS RELATIVE PERCENT: 70.4 %
PLATELET # BLD: 188 K/ΜL
PMV BLD AUTO: 9 FL
POTASSIUM SERPL-SCNC: 4.5 MMOL/L
RBC # BLD: 3.78 10^6/ΜL
SODIUM BLD-SCNC: 135 MMOL/L
WBC # BLD: 7.1 10^3/ML

## 2018-11-15 ENCOUNTER — OFFICE VISIT (OUTPATIENT)
Dept: GERIATRIC MEDICINE | Age: 83
End: 2018-11-15
Payer: MEDICARE

## 2018-11-15 DIAGNOSIS — Z87.898 HISTORY OF URINARY RETENTION: ICD-10-CM

## 2018-11-15 DIAGNOSIS — Z91.81 HX OF FALLING: ICD-10-CM

## 2018-11-15 DIAGNOSIS — R13.12 OROPHARYNGEAL DYSPHAGIA: ICD-10-CM

## 2018-11-15 PROCEDURE — 99307 SBSQ NF CARE SF MDM 10: CPT | Performed by: PHYSICIAN ASSISTANT

## 2018-11-26 VITALS
RESPIRATION RATE: 18 BRPM | TEMPERATURE: 97.8 F | OXYGEN SATURATION: 96 % | SYSTOLIC BLOOD PRESSURE: 111 MMHG | DIASTOLIC BLOOD PRESSURE: 71 MMHG | HEART RATE: 86 BPM

## 2018-11-26 VITALS
HEART RATE: 86 BPM | RESPIRATION RATE: 18 BRPM | SYSTOLIC BLOOD PRESSURE: 111 MMHG | TEMPERATURE: 97.8 F | DIASTOLIC BLOOD PRESSURE: 71 MMHG | OXYGEN SATURATION: 96 %

## 2018-11-26 NOTE — PROGRESS NOTES
Admit to medicine - Tele - Optho consult. Await optho input and consider further rx options after etiology is confirmed. - Fall precautions - Neuro checks - ECHO - Carotid doppler  Will monitor.  Vitamin D deficiency        PHYSICAL EXAMINATION: See Epic note. She is sitting up in the chair, no acute distress. Her lungs are clear to auscultation. Abdomen: Nontender. Lower extremities about 1+ edema. Weight is down 2 pounds. ASSESSMENT AND PLAN: Atelectasis without any hypoxia: Leg edema improved. We will check a BMP, CBC on Tuesday. Her last CBC was normal. She is down 2 pounds. We will continue to follow.           Electronically Signed By: FATMATA Meza GNP-BC on 11/26/2018 09:17:18  ______________________________  FATMATA eMza GNP-BC  /[id number]  D: 11/25/2018 11:54:30  T: 11/25/2018 23:07:40    cc: [facility]

## 2018-12-05 ENCOUNTER — OFFICE VISIT (OUTPATIENT)
Dept: GERIATRIC MEDICINE | Age: 83
End: 2018-12-05
Payer: MEDICARE

## 2018-12-05 DIAGNOSIS — R63.5 WEIGHT GAIN: ICD-10-CM

## 2018-12-05 DIAGNOSIS — E87.70 HYPERVOLEMIA, UNSPECIFIED HYPERVOLEMIA TYPE: Primary | ICD-10-CM

## 2018-12-05 PROCEDURE — 1101F PT FALLS ASSESS-DOCD LE1/YR: CPT | Performed by: NURSE PRACTITIONER

## 2018-12-05 PROCEDURE — 1123F ACP DISCUSS/DSCN MKR DOCD: CPT | Performed by: NURSE PRACTITIONER

## 2018-12-05 PROCEDURE — 99309 SBSQ NF CARE MODERATE MDM 30: CPT | Performed by: NURSE PRACTITIONER

## 2018-12-05 PROCEDURE — G8484 FLU IMMUNIZE NO ADMIN: HCPCS | Performed by: NURSE PRACTITIONER

## 2018-12-09 PROBLEM — Z87.898 HISTORY OF URINARY RETENTION: Status: ACTIVE | Noted: 2018-12-09

## 2018-12-09 PROBLEM — Z91.81 HX OF FALLING: Status: ACTIVE | Noted: 2018-12-09

## 2018-12-09 PROBLEM — R13.10 DYSPHAGIA: Status: ACTIVE | Noted: 2018-12-09

## 2018-12-09 RX ORDER — PANTOPRAZOLE SODIUM 40 MG/1
TABLET, DELAYED RELEASE ORAL
COMMUNITY
Start: 2018-07-17 | End: 2018-12-09 | Stop reason: SDUPTHER

## 2018-12-10 ENCOUNTER — OFFICE VISIT (OUTPATIENT)
Dept: GERIATRIC MEDICINE | Age: 83
End: 2018-12-10
Payer: MEDICARE

## 2018-12-10 DIAGNOSIS — R60.0 BILATERAL LEG EDEMA: ICD-10-CM

## 2018-12-10 DIAGNOSIS — I50.21 ACUTE SYSTOLIC CONGESTIVE HEART FAILURE (HCC): Primary | ICD-10-CM

## 2018-12-10 LAB
BUN BLDV-MCNC: 19 MG/DL
CALCIUM SERPL-MCNC: 9.5 MG/DL
CHLORIDE BLD-SCNC: 95 MMOL/L
CO2: 29 MMOL/L
CREAT SERPL-MCNC: 0.8 MG/DL
GFR CALCULATED: NORMAL
GLUCOSE BLD-MCNC: 98 MG/DL
POTASSIUM SERPL-SCNC: 4.8 MMOL/L
SODIUM BLD-SCNC: 131 MMOL/L

## 2018-12-10 PROCEDURE — 1123F ACP DISCUSS/DSCN MKR DOCD: CPT | Performed by: NURSE PRACTITIONER

## 2018-12-10 PROCEDURE — G8484 FLU IMMUNIZE NO ADMIN: HCPCS | Performed by: NURSE PRACTITIONER

## 2018-12-10 PROCEDURE — 1101F PT FALLS ASSESS-DOCD LE1/YR: CPT | Performed by: NURSE PRACTITIONER

## 2018-12-10 PROCEDURE — 99309 SBSQ NF CARE MODERATE MDM 30: CPT | Performed by: NURSE PRACTITIONER

## 2018-12-17 LAB
BUN BLDV-MCNC: 22 MG/DL
CALCIUM SERPL-MCNC: 9.8 MG/DL
CHLORIDE BLD-SCNC: 99 MMOL/L
CO2: 21 MMOL/L
CREAT SERPL-MCNC: 0.9 MG/DL
GFR CALCULATED: NORMAL
GLUCOSE BLD-MCNC: 184 MG/DL
HCT VFR BLD CALC: 39.6 % (ref 36–46)
HEMOGLOBIN: 13.4 G/DL (ref 12–16)
PLATELET # BLD: 225 K/ΜL
POTASSIUM SERPL-SCNC: 4.1 MMOL/L
SODIUM BLD-SCNC: 134 MMOL/L
WBC # BLD: 7.8 10^3/ML

## 2018-12-21 VITALS
HEART RATE: 81 BPM | SYSTOLIC BLOOD PRESSURE: 132 MMHG | BODY MASS INDEX: 30.31 KG/M2 | OXYGEN SATURATION: 98 % | DIASTOLIC BLOOD PRESSURE: 79 MMHG | TEMPERATURE: 98 F | RESPIRATION RATE: 18 BRPM | WEIGHT: 145 LBS

## 2018-12-21 RX ORDER — TRAMADOL HYDROCHLORIDE 50 MG/1
50 TABLET ORAL DAILY
COMMUNITY
End: 2019-06-18 | Stop reason: SDUPTHER

## 2019-01-04 PROCEDURE — 96375 TX/PRO/DX INJ NEW DRUG ADDON: CPT

## 2019-01-04 PROCEDURE — 96366 THER/PROPH/DIAG IV INF ADDON: CPT

## 2019-01-04 PROCEDURE — 96365 THER/PROPH/DIAG IV INF INIT: CPT

## 2019-01-05 PROCEDURE — 96366 THER/PROPH/DIAG IV INF ADDON: CPT

## 2019-01-05 PROCEDURE — 96376 TX/PRO/DX INJ SAME DRUG ADON: CPT

## 2019-01-06 PROCEDURE — 96376 TX/PRO/DX INJ SAME DRUG ADON: CPT

## 2019-01-06 PROCEDURE — 96375 TX/PRO/DX INJ NEW DRUG ADDON: CPT

## 2019-01-11 ENCOUNTER — OFFICE VISIT (OUTPATIENT)
Dept: GERIATRIC MEDICINE | Age: 84
End: 2019-01-11
Payer: MEDICARE

## 2019-01-11 VITALS
OXYGEN SATURATION: 97 % | RESPIRATION RATE: 20 BRPM | SYSTOLIC BLOOD PRESSURE: 170 MMHG | WEIGHT: 144.8 LBS | BODY MASS INDEX: 30.26 KG/M2 | HEART RATE: 94 BPM | TEMPERATURE: 96.5 F | DIASTOLIC BLOOD PRESSURE: 94 MMHG

## 2019-01-11 DIAGNOSIS — A09 DIARRHEA OF INFECTIOUS ORIGIN: ICD-10-CM

## 2019-01-11 DIAGNOSIS — I50.22 CHRONIC SYSTOLIC CONGESTIVE HEART FAILURE (HCC): Primary | ICD-10-CM

## 2019-01-11 PROCEDURE — 1101F PT FALLS ASSESS-DOCD LE1/YR: CPT | Performed by: NURSE PRACTITIONER

## 2019-01-11 PROCEDURE — G8484 FLU IMMUNIZE NO ADMIN: HCPCS | Performed by: NURSE PRACTITIONER

## 2019-01-11 PROCEDURE — 1123F ACP DISCUSS/DSCN MKR DOCD: CPT | Performed by: NURSE PRACTITIONER

## 2019-01-11 PROCEDURE — 99308 SBSQ NF CARE LOW MDM 20: CPT | Performed by: NURSE PRACTITIONER

## 2019-01-30 ENCOUNTER — OFFICE VISIT (OUTPATIENT)
Dept: GERIATRIC MEDICINE | Age: 84
End: 2019-01-30
Payer: MEDICARE

## 2019-01-30 DIAGNOSIS — M06.9 RHEUMATOID ARTHRITIS, INVOLVING UNSPECIFIED SITE, UNSPECIFIED RHEUMATOID FACTOR PRESENCE: Primary | ICD-10-CM

## 2019-01-30 DIAGNOSIS — I48.20 CHRONIC ATRIAL FIBRILLATION (HCC): ICD-10-CM

## 2019-01-30 DIAGNOSIS — E78.5 HYPERLIPIDEMIA, UNSPECIFIED HYPERLIPIDEMIA TYPE: ICD-10-CM

## 2019-01-30 PROCEDURE — G8484 FLU IMMUNIZE NO ADMIN: HCPCS | Performed by: INTERNAL MEDICINE

## 2019-01-30 PROCEDURE — 99308 SBSQ NF CARE LOW MDM 20: CPT | Performed by: INTERNAL MEDICINE

## 2019-01-30 PROCEDURE — 1123F ACP DISCUSS/DSCN MKR DOCD: CPT | Performed by: INTERNAL MEDICINE

## 2019-02-06 LAB
ALBUMIN SERPL-MCNC: 3.8 G/DL
ALP BLD-CCNC: 90 U/L
ALT SERPL-CCNC: 20 U/L
ANION GAP SERPL CALCULATED.3IONS-SCNC: 1.6 MMOL/L
AST SERPL-CCNC: 19 U/L
BASOPHILS ABSOLUTE: 0.1 /ΜL
BASOPHILS RELATIVE PERCENT: 0.9 %
BILIRUB SERPL-MCNC: 0.6 MG/DL (ref 0.1–1.4)
BUN BLDV-MCNC: 21 MG/DL
CALCIUM SERPL-MCNC: 9.4 MG/DL
CHLORIDE BLD-SCNC: 102 MMOL/L
CHOLESTEROL, TOTAL: 136 MG/DL
CHOLESTEROL/HDL RATIO: 1.4
CO2: 24 MMOL/L
CREAT SERPL-MCNC: 0.8 MG/DL
EOSINOPHILS ABSOLUTE: 0.3 /ΜL
EOSINOPHILS RELATIVE PERCENT: 3.4 %
GFR CALCULATED: 67
GLUCOSE BLD-MCNC: 95 MG/DL
HCT VFR BLD CALC: 37 % (ref 36–46)
HDLC SERPL-MCNC: 43 MG/DL (ref 35–70)
HEMOGLOBIN: 12.5 G/DL (ref 12–16)
LDL CHOLESTEROL CALCULATED: 61 MG/DL (ref 0–160)
LYMPHOCYTES ABSOLUTE: 1.1 /ΜL
LYMPHOCYTES RELATIVE PERCENT: 14 %
MCH RBC QN AUTO: 31.6 PG
MCHC RBC AUTO-ENTMCNC: 33.7 G/DL
MCV RBC AUTO: 93.8 FL
MONOCYTES ABSOLUTE: 0.8 /ΜL
MONOCYTES RELATIVE PERCENT: 10.6 %
NEUTROPHILS ABSOLUTE: 5.4 /ΜL
NEUTROPHILS RELATIVE PERCENT: 71.1 %
PLATELET # BLD: 220 K/ΜL
PMV BLD AUTO: 9.5 FL
POTASSIUM SERPL-SCNC: 4.7 MMOL/L
RBC # BLD: 3.95 10^6/ΜL
SODIUM BLD-SCNC: 135 MMOL/L
TOTAL PROTEIN: 6.2
TRIGL SERPL-MCNC: 160 MG/DL
VLDLC SERPL CALC-MCNC: 32 MG/DL
WBC # BLD: 7.6 10^3/ML

## 2019-02-08 VITALS
HEART RATE: 74 BPM | BODY MASS INDEX: 27.34 KG/M2 | TEMPERATURE: 96.5 F | OXYGEN SATURATION: 98 % | SYSTOLIC BLOOD PRESSURE: 115 MMHG | DIASTOLIC BLOOD PRESSURE: 61 MMHG | RESPIRATION RATE: 18 BRPM | WEIGHT: 130.8 LBS

## 2019-03-22 ENCOUNTER — OFFICE VISIT (OUTPATIENT)
Dept: GERIATRIC MEDICINE | Age: 84
End: 2019-03-22
Payer: MEDICARE

## 2019-03-22 DIAGNOSIS — G47.00 INSOMNIA, UNSPECIFIED TYPE: ICD-10-CM

## 2019-03-22 DIAGNOSIS — I10 ESSENTIAL HYPERTENSION: ICD-10-CM

## 2019-03-22 DIAGNOSIS — M15.9 OSTEOARTHRITIS OF MULTIPLE JOINTS, UNSPECIFIED OSTEOARTHRITIS TYPE: Primary | ICD-10-CM

## 2019-03-22 PROCEDURE — G8484 FLU IMMUNIZE NO ADMIN: HCPCS | Performed by: INTERNAL MEDICINE

## 2019-03-22 PROCEDURE — 99309 SBSQ NF CARE MODERATE MDM 30: CPT | Performed by: INTERNAL MEDICINE

## 2019-03-22 PROCEDURE — 1123F ACP DISCUSS/DSCN MKR DOCD: CPT | Performed by: INTERNAL MEDICINE

## 2019-04-03 ENCOUNTER — OFFICE VISIT (OUTPATIENT)
Dept: GERIATRIC MEDICINE | Age: 84
End: 2019-04-03
Payer: MEDICARE

## 2019-04-03 DIAGNOSIS — K21.9 GASTROESOPHAGEAL REFLUX DISEASE, ESOPHAGITIS PRESENCE NOT SPECIFIED: ICD-10-CM

## 2019-04-03 DIAGNOSIS — I50.9 CONGESTIVE HEART FAILURE, UNSPECIFIED HF CHRONICITY, UNSPECIFIED HEART FAILURE TYPE (HCC): ICD-10-CM

## 2019-04-03 DIAGNOSIS — I10 ESSENTIAL HYPERTENSION: Primary | Chronic | ICD-10-CM

## 2019-04-03 PROCEDURE — 1123F ACP DISCUSS/DSCN MKR DOCD: CPT | Performed by: NURSE PRACTITIONER

## 2019-04-03 PROCEDURE — 99308 SBSQ NF CARE LOW MDM 20: CPT | Performed by: NURSE PRACTITIONER

## 2019-04-17 ENCOUNTER — OFFICE VISIT (OUTPATIENT)
Dept: GERIATRIC MEDICINE | Age: 84
End: 2019-04-17
Payer: MEDICARE

## 2019-04-17 DIAGNOSIS — F41.9 ANXIETY: Primary | ICD-10-CM

## 2019-04-17 PROCEDURE — 1123F ACP DISCUSS/DSCN MKR DOCD: CPT | Performed by: NURSE PRACTITIONER

## 2019-04-17 PROCEDURE — 99309 SBSQ NF CARE MODERATE MDM 30: CPT | Performed by: NURSE PRACTITIONER

## 2019-04-18 VITALS
WEIGHT: 135 LBS | HEART RATE: 75 BPM | OXYGEN SATURATION: 97 % | RESPIRATION RATE: 18 BRPM | DIASTOLIC BLOOD PRESSURE: 68 MMHG | BODY MASS INDEX: 28.22 KG/M2 | SYSTOLIC BLOOD PRESSURE: 114 MMHG | TEMPERATURE: 98.5 F

## 2019-04-26 NOTE — PROGRESS NOTES
PATIENT: Alex Dixon : 10/09/1926 DOS: 2019     [facility]    Seen for routine monthly visit for hypertension, atrial fibrillation, degenerative joint disease, rheumatoid arthritis. MEDICATIONS:  Reviewed. SUBJECTIVE:  This 41-year-old woman was evaluated in her room. The patient has intermittent low back pain. No recent headache, fevers, chills. No change in her bowel or bladder habits. Oral intake has been good. She remains globally weak. She has been unsteady. No new lightheadedness. No new change in her bladder habits. OBJECTIVE:  She appeared chronically ill. Pupils are small, poor visual acuity. Oral mucosa moist.  No thrush. Neck was supple. Chest showed diminished breath sounds. No crackles. Cardiovascular exam showed a regular rate. Abdomen was soft, nontender. Extremities showed trace pedal edema. ASSESSMENT AND PLAN:  1. Rheumatoid arthritis. The patient is following up with rheumatology as needed. The patient has new flares to the rheumatic changes in her hands. She has been on tramadol and tolerating it well. Currently is not on disease modifying drugs. 2.   Atrial fibrillation, rate controlled. She has been anticoagulated. No bleeding diathesis. 3.   Hyperlipidemia. The patient has been on statin therapy. No evidence of myalgias. We will monitor closely.          Electronically Signed By: Paris Boas, M.D. on 2019 11:09:07  ______________________________  Paris Boas, M.D.  [id number]  D: 2019 02:09:58  T: 2019 23:01:14    cc: [address]

## 2019-04-29 VITALS
DIASTOLIC BLOOD PRESSURE: 68 MMHG | HEART RATE: 75 BPM | TEMPERATURE: 98.5 F | BODY MASS INDEX: 28.8 KG/M2 | SYSTOLIC BLOOD PRESSURE: 114 MMHG | WEIGHT: 137.8 LBS | RESPIRATION RATE: 18 BRPM | OXYGEN SATURATION: 97 %

## 2019-04-29 RX ORDER — PAROXETINE 10 MG/1
10 TABLET, FILM COATED ORAL DAILY
Qty: 30 TABLET | Refills: 3
Start: 2019-04-29 | End: 2019-05-29

## 2019-04-29 NOTE — PROGRESS NOTES
PATIENT: Rosie Ortega : 10/09/1926 DOS: 2019     [address]  Chief Complaint   Patient presents with    Anxiety       REASON FOR VISIT: The patient is being seen today for acute visit for anxiety. SUBJECTIVE: Nursing staff report resident is very anxious and they would like to know if there is something we can give her to help with her anxiety; otherwise, no concerns. FAMILY AND SOCIAL HISTORY: Refer to H&P. Past Medical History:   Diagnosis Date    Anxiety     Atrial fibrillation ([address]     Bilateral carotid artery stenosis 10/27/2017    Central retinal artery occlusion, right eye 10/17/2017    Chronic constipation     Chronic venous insufficiency     Dyslipidemia     Essential hypertension     HTN (hypertension) 2013    Neurodermatitis     chronic, of bilateral lower extremities    Osteoporosis     Pap test, as part of routine gynecological examination     years ago   [facility]     about 5 yrs ago    [address]onegative rheumatoid arthritis [address] [address]     Status post total replacement of right hip 2017    Vision loss of right eye 10/17/2017    Overview:  - 80year old female who presents with PMH of HTN,AFIB(declined AC/aspirin),HLP presented to OSH yesterday evening with C/O right eye vision loss around 5 pm yesterday. Painless. - Had CT head without contrast at OSH ED that shows: No acute intracranial abnormality. 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 - EKG shows sinus bradycardia with PAC(R-58) - Concern for retinal artery occlusion?> 12 hours have passed. - Afib related? Carotid bruit? - BP was 210/85 initially and then came down to 160/69 -Normal CRP - No other acute neuro deficit seen. -Spoke to Latisha on GXHQ([id number])- alon Villalta and he will arrange for pt to be seen today at [facility]. No acute intervention needed as pt well out of time frame(as per optho).   Plan: - Admit to [facility] consult. Await optho input and consider further rx options after etiology is confirmed. - Fall precautions - Neuro checks - ECHO - Carotid doppler  Will monitor.  Vitamin D deficiency        MEDICATIONS AND ALLERGIES: Reviewed on chart at nursing facility. REVIEW OF SYSTEMS: Resident denies any headache, dizziness, blurred vision, chest pain, shortness of breath, abdominal pain, nausea, vomiting, or changes in her bowel or bladder habits. She states she is eating well, sleeping well, and has no pain. PHYSICAL EXAMINATION: Most recent vital signs: /68, temp 98.5, heart rate 75, respirations 18, pulse oxing 97% on room air. Weight 137.8. CONSTITUTIONAL: Resident is alert, elderly female, pleasant and cooperative with exam. INTEGUMENT: Pink, warm, dry. HEENT: Normocephalic, atraumatic. Conjunctivae pink. Sclerae nonicteric. Mucous membranes pink, moist. No oropharyngeal exudate. NECK: Supple. No JVD noted. CV: Regular with ectopic beats. RESPIRATORY: LSCTA. Respirations even, unlabored. ABDOMEN: Soft, NT, ND. Normoactive bowel sounds. PV: Peripheral pulses present. No edema noted. ASSESSMENT AND PLAN: Anxiety: We will start the resident on Paxil 10 mg p.o. q.d. and continue to monitor her. No further changes will be made at this time. We will continue to monitor the resident for overall comfort, function, safety. Return in about 1 week (around 4/24/2019). Electronically Signed By: Itz Mishra CNP on 04/28/2019 09:34:46  ______________________________  Itz Mishra CNP  [id number]  D: 04/27/2019 11:30:25  T: 04/28/2019 07:39:41    cc: - [facility]

## 2019-05-14 ENCOUNTER — OFFICE VISIT (OUTPATIENT)
Dept: GERIATRIC MEDICINE | Age: 84
End: 2019-05-14
Payer: MEDICARE

## 2019-05-14 DIAGNOSIS — I10 ESSENTIAL HYPERTENSION: ICD-10-CM

## 2019-05-14 DIAGNOSIS — M15.9 OSTEOARTHRITIS OF MULTIPLE JOINTS, UNSPECIFIED OSTEOARTHRITIS TYPE: Primary | ICD-10-CM

## 2019-05-14 DIAGNOSIS — K59.00 CONSTIPATION, UNSPECIFIED CONSTIPATION TYPE: ICD-10-CM

## 2019-05-14 PROCEDURE — 1123F ACP DISCUSS/DSCN MKR DOCD: CPT | Performed by: INTERNAL MEDICINE

## 2019-05-14 PROCEDURE — 99308 SBSQ NF CARE LOW MDM 20: CPT | Performed by: INTERNAL MEDICINE

## 2019-06-12 ENCOUNTER — OFFICE VISIT (OUTPATIENT)
Dept: GERIATRIC MEDICINE | Age: 84
End: 2019-06-12
Payer: MEDICARE

## 2019-06-12 DIAGNOSIS — I48.20 CHRONIC ATRIAL FIBRILLATION (HCC): Primary | Chronic | ICD-10-CM

## 2019-06-12 DIAGNOSIS — M62.81 MUSCLE WEAKNESS: ICD-10-CM

## 2019-06-12 DIAGNOSIS — M19.90 OSTEOARTHRITIS, UNSPECIFIED OSTEOARTHRITIS TYPE, UNSPECIFIED SITE: ICD-10-CM

## 2019-06-12 PROCEDURE — 1123F ACP DISCUSS/DSCN MKR DOCD: CPT | Performed by: NURSE PRACTITIONER

## 2019-06-12 PROCEDURE — 99308 SBSQ NF CARE LOW MDM 20: CPT | Performed by: NURSE PRACTITIONER

## 2019-06-18 DIAGNOSIS — G89.4 CHRONIC PAIN SYNDROME: Primary | ICD-10-CM

## 2019-06-18 RX ORDER — TRAMADOL HYDROCHLORIDE 50 MG/1
50 TABLET ORAL DAILY
Qty: 30 TABLET | Refills: 0 | Status: SHIPPED | OUTPATIENT
Start: 2019-06-18 | End: 2019-07-16 | Stop reason: SDUPTHER

## 2019-07-08 ENCOUNTER — OFFICE VISIT (OUTPATIENT)
Dept: GERIATRIC MEDICINE | Age: 84
End: 2019-07-08
Payer: MEDICARE

## 2019-07-08 DIAGNOSIS — I48.20 CHRONIC ATRIAL FIBRILLATION (HCC): Primary | ICD-10-CM

## 2019-07-08 DIAGNOSIS — M15.9 OSTEOARTHRITIS OF MULTIPLE JOINTS, UNSPECIFIED OSTEOARTHRITIS TYPE: ICD-10-CM

## 2019-07-08 DIAGNOSIS — I10 ESSENTIAL HYPERTENSION: ICD-10-CM

## 2019-07-08 PROCEDURE — 99309 SBSQ NF CARE MODERATE MDM 30: CPT | Performed by: INTERNAL MEDICINE

## 2019-07-08 PROCEDURE — 1123F ACP DISCUSS/DSCN MKR DOCD: CPT | Performed by: INTERNAL MEDICINE

## 2019-07-16 VITALS
TEMPERATURE: 97.6 F | WEIGHT: 131 LBS | HEART RATE: 64 BPM | BODY MASS INDEX: 27.38 KG/M2 | DIASTOLIC BLOOD PRESSURE: 58 MMHG | RESPIRATION RATE: 18 BRPM | SYSTOLIC BLOOD PRESSURE: 112 MMHG | OXYGEN SATURATION: 96 %

## 2019-07-16 DIAGNOSIS — G89.4 CHRONIC PAIN SYNDROME: ICD-10-CM

## 2019-07-16 RX ORDER — TRAMADOL HYDROCHLORIDE 50 MG/1
50 TABLET ORAL DAILY
Qty: 30 TABLET | Refills: 0 | Status: SHIPPED | OUTPATIENT
Start: 2019-07-16 | End: 2019-08-15

## 2019-07-16 NOTE — PROGRESS NOTES
frame(as per optho). Plan: - Admit to [facility] consult. Await optho input and consider further rx options after etiology is confirmed. - Fall precautions - Neuro checks - ECHO - Carotid doppler  Will monitor.  Vitamin D deficiency        MEDICATIONS AND ALLERGIES: Reviewed on chart at nursing facility. REVIEW OF SYSTEMS: Resident denies any headache, dizziness, blurred vision, chest pain, shortness of breath, abdominal pain, nausea, vomiting, or changes in her bowel or bladder habits. She states she is eating well, sleeping well and has no pain. PHYSICAL EXAMINATION: Most recent vital signs, blood pressure 112/58, temp 97.6, heart rate 64, respirations 18, pulse oxing 96% on room air and weight 131. CONSTITUTIONAL: Resident is alert, elderly frail female, in no apparent distress. Pleasant, cooperative with exam. INTEGUMENT: Pink, warm, dry. She does have thin skin. HEENT: Normocephalic, atraumatic. Hard of hearing. Conjunctivae pink. Sclerae nonicteric. Mucous membranes pink, moist. No oropharyngeal exudate. NECK: Supple. No cervical or clavicular lymphadenopathy noted. CV: Regular rate and rhythm. No murmurs, gallops or rubs noted. RESPIRATORY: Lung sounds clear through all fields. Respirations even, nonlabored. ABDOMEN: Soft, nontender, nondistended, normoactive bowel sounds. PV: Peripheral pulses present. No edema noted. ASSESSMENT AND PLAN:   1. Muscle weakness. Resident is ambulating with a wheeled walker without incident. 2.  Osteoarthritis. The patient is currently not experiencing any pain. She does report that when she does have pain, the medication they are giving her is effective in meeting her pain needs. She does have tramadol for her pain when needed. 3.  Afib. Resident is currently in a regular rhythm. We will continue her diltiazem and her Xarelto as ordered. I have reviewed this resident's medication and treatment plan, as well as most recent laboratory work.  No

## 2019-07-22 PROBLEM — M62.81 MUSCLE WEAKNESS: Status: ACTIVE | Noted: 2019-07-22

## 2019-07-22 PROBLEM — M19.90 OSTEOARTHRITIS: Status: ACTIVE | Noted: 2019-07-22

## 2019-08-06 LAB
ALBUMIN SERPL-MCNC: 4.1 G/DL
ALP BLD-CCNC: 86 U/L
ALT SERPL-CCNC: 14 U/L
ANION GAP SERPL CALCULATED.3IONS-SCNC: 1.5 MMOL/L
AST SERPL-CCNC: 18 U/L
BASOPHILS ABSOLUTE: NORMAL /ΜL
BASOPHILS RELATIVE PERCENT: NORMAL %
BILIRUB SERPL-MCNC: 0.6 MG/DL (ref 0.1–1.4)
BILIRUBIN DIRECT: 0.1 MG/DL
BUN BLDV-MCNC: 19 MG/DL
CALCIUM SERPL-MCNC: 10.2 MG/DL
CHLORIDE BLD-SCNC: 97 MMOL/L
CHOLESTEROL, TOTAL: 151 MG/DL
CHOLESTEROL/HDL RATIO: 1.3
CO2: 25 MMOL/L
CREAT SERPL-MCNC: 1 MG/DL
EOSINOPHILS ABSOLUTE: NORMAL /ΜL
EOSINOPHILS RELATIVE PERCENT: NORMAL %
GFR CALCULATED: 52
GLUCOSE BLD-MCNC: 140 MG/DL
HCT VFR BLD CALC: 40.8 % (ref 36–46)
HDLC SERPL-MCNC: 50 MG/DL (ref 35–70)
HEMOGLOBIN: 13.6 G/DL (ref 12–16)
LDL CHOLESTEROL CALCULATED: 66 MG/DL (ref 0–160)
LYMPHOCYTES ABSOLUTE: NORMAL /ΜL
LYMPHOCYTES RELATIVE PERCENT: NORMAL %
MCH RBC QN AUTO: 32.7 PG
MCHC RBC AUTO-ENTMCNC: 33.2 G/DL
MCV RBC AUTO: 98.3 FL
MONOCYTES ABSOLUTE: NORMAL /ΜL
MONOCYTES RELATIVE PERCENT: NORMAL %
NEUTROPHILS ABSOLUTE: NORMAL /ΜL
NEUTROPHILS RELATIVE PERCENT: NORMAL %
PLATELET # BLD: 189 K/ΜL
PMV BLD AUTO: 9.3 FL
POTASSIUM SERPL-SCNC: 4.4 MMOL/L
RBC # BLD: 4.15 10^6/ΜL
SODIUM BLD-SCNC: 135 MMOL/L
TOTAL PROTEIN: 6.8
TRIGL SERPL-MCNC: 175 MG/DL
VLDLC SERPL CALC-MCNC: 35 MG/DL
WBC # BLD: 8.6 10^3/ML

## 2019-08-28 ENCOUNTER — OFFICE VISIT (OUTPATIENT)
Dept: GERIATRIC MEDICINE | Age: 84
End: 2019-08-28
Payer: MEDICARE

## 2019-08-28 DIAGNOSIS — K21.9 GASTROESOPHAGEAL REFLUX DISEASE, ESOPHAGITIS PRESENCE NOT SPECIFIED: ICD-10-CM

## 2019-08-28 DIAGNOSIS — I10 ESSENTIAL HYPERTENSION: Primary | Chronic | ICD-10-CM

## 2019-08-28 DIAGNOSIS — I50.9 CONGESTIVE HEART FAILURE, UNSPECIFIED HF CHRONICITY, UNSPECIFIED HEART FAILURE TYPE (HCC): ICD-10-CM

## 2019-08-28 PROCEDURE — 1123F ACP DISCUSS/DSCN MKR DOCD: CPT | Performed by: NURSE PRACTITIONER

## 2019-08-28 PROCEDURE — 99308 SBSQ NF CARE LOW MDM 20: CPT | Performed by: NURSE PRACTITIONER

## 2019-09-24 NOTE — PROGRESS NOTES
PATIENT: Mag Bland : 10/09/1926 DOS: 2019     [facility]    Seen for a routine monthly visit for her rheumatoid arthritis, seronegative hypertension, atrial fibrillation and degenerative joint disease. Medications are reviewed. SUBJECTIVE:  This 51-year-old woman was evaluated in her room. The patient has been clinically stable. No evidence of new acute pain crisis. No new lightheadedness. No emesis, fever or chills. Nursing staff record patient has been clinically stable. OBJECTIVE:  She appeared chronically ill. Pupils are small, asymmetric on the right, nonreactive. Oral mucosa is moist.  Chest showed diminished breath sounds. No crackles. No wheezing. Cardiovascular exam showed a regular rate. Abdomen was soft, nontender. Extremities showed a +1 dorsal pedal pulse. ASSESSMENT AND PLAN:  1. Degenerative joint disease. No new pain crisis. 2.   Hypertension. Blood pressure is stable. We will monitor. 3.   Chronic constipation. No new impaction. We will follow as needed.         Electronically Signed By: Michelle Davis M.D. on 2019 10:15:57  ______________________________  Michelle Davis M.D.  [id number]  D: 2019 00:49:42  T: 2019 02:44:52    cc: [facility]

## 2019-09-27 VITALS
BODY MASS INDEX: 28.51 KG/M2 | SYSTOLIC BLOOD PRESSURE: 121 MMHG | WEIGHT: 136.4 LBS | RESPIRATION RATE: 18 BRPM | DIASTOLIC BLOOD PRESSURE: 64 MMHG | OXYGEN SATURATION: 94 % | HEART RATE: 58 BPM | TEMPERATURE: 98 F

## 2019-09-27 NOTE — PROGRESS NOTES
9/28/2019) for chronic conditions. Electronically Signed By: Dennise Salinas CNP on 09/26/2019 23:11:09  ______________________________  Dennise Salinas CNP  [id number]  D: 09/24/2019 05:45:59  T: 09/24/2019 07:50:58    cc: [facility]

## 2019-09-29 PROBLEM — I50.9 CONGESTIVE HEART FAILURE (HCC): Status: ACTIVE | Noted: 2019-09-29

## 2019-09-29 PROBLEM — K21.9 GASTROESOPHAGEAL REFLUX DISEASE: Status: ACTIVE | Noted: 2019-09-29

## 2019-10-30 ENCOUNTER — OFFICE VISIT (OUTPATIENT)
Dept: GERIATRIC MEDICINE | Age: 84
End: 2019-10-30
Payer: MEDICARE

## 2019-10-30 DIAGNOSIS — I10 ESSENTIAL HYPERTENSION: Primary | Chronic | ICD-10-CM

## 2019-10-30 DIAGNOSIS — I48.91 ATRIAL FIBRILLATION, UNSPECIFIED TYPE (HCC): Chronic | ICD-10-CM

## 2019-10-30 DIAGNOSIS — I50.9 CONGESTIVE HEART FAILURE, UNSPECIFIED HF CHRONICITY, UNSPECIFIED HEART FAILURE TYPE (HCC): ICD-10-CM

## 2019-10-30 PROCEDURE — 99308 SBSQ NF CARE LOW MDM 20: CPT | Performed by: NURSE PRACTITIONER

## 2019-10-30 PROCEDURE — 1123F ACP DISCUSS/DSCN MKR DOCD: CPT | Performed by: NURSE PRACTITIONER

## 2019-10-30 PROCEDURE — G8484 FLU IMMUNIZE NO ADMIN: HCPCS | Performed by: NURSE PRACTITIONER

## 2019-11-08 VITALS
OXYGEN SATURATION: 98 % | RESPIRATION RATE: 16 BRPM | BODY MASS INDEX: 27.71 KG/M2 | SYSTOLIC BLOOD PRESSURE: 109 MMHG | DIASTOLIC BLOOD PRESSURE: 52 MMHG | HEART RATE: 88 BPM | TEMPERATURE: 97.9 F | WEIGHT: 132.6 LBS

## 2019-11-18 ENCOUNTER — OFFICE VISIT (OUTPATIENT)
Dept: GERIATRIC MEDICINE | Age: 84
End: 2019-11-18
Payer: MEDICARE

## 2019-11-18 DIAGNOSIS — E46 PROTEIN MALNUTRITION (HCC): ICD-10-CM

## 2019-11-18 DIAGNOSIS — M15.9 OSTEOARTHRITIS OF MULTIPLE JOINTS, UNSPECIFIED OSTEOARTHRITIS TYPE: ICD-10-CM

## 2019-11-18 DIAGNOSIS — I48.20 CHRONIC ATRIAL FIBRILLATION (HCC): Primary | ICD-10-CM

## 2019-11-18 PROCEDURE — 1123F ACP DISCUSS/DSCN MKR DOCD: CPT | Performed by: INTERNAL MEDICINE

## 2019-11-18 PROCEDURE — 99309 SBSQ NF CARE MODERATE MDM 30: CPT | Performed by: INTERNAL MEDICINE

## 2019-11-18 PROCEDURE — G8484 FLU IMMUNIZE NO ADMIN: HCPCS | Performed by: INTERNAL MEDICINE

## 2019-11-19 LAB
BASOPHILS ABSOLUTE: 0.1 /ΜL
BASOPHILS RELATIVE PERCENT: 0.8 %
BUN BLDV-MCNC: 19 MG/DL
CALCIUM SERPL-MCNC: 9.3 MG/DL
CHLORIDE BLD-SCNC: 103 MMOL/L
CO2: 25 MMOL/L
CREAT SERPL-MCNC: 0.8 MG/DL
EOSINOPHILS ABSOLUTE: 0.3 /ΜL
EOSINOPHILS RELATIVE PERCENT: 3.4 %
GFR CALCULATED: 67
GLUCOSE BLD-MCNC: 84 MG/DL
HCT VFR BLD CALC: 37.3 % (ref 36–46)
HEMOGLOBIN: 12.5 G/DL (ref 12–16)
LYMPHOCYTES ABSOLUTE: 1.5 /ΜL
LYMPHOCYTES RELATIVE PERCENT: 20.9 %
MCH RBC QN AUTO: 33 PG
MCHC RBC AUTO-ENTMCNC: 33.5 G/DL
MCV RBC AUTO: 98.5 FL
MONOCYTES ABSOLUTE: 0.7 /ΜL
MONOCYTES RELATIVE PERCENT: 10 %
NEUTROPHILS ABSOLUTE: 4.8 /ΜL
NEUTROPHILS RELATIVE PERCENT: 64.9 %
PLATELET # BLD: 183 K/ΜL
PMV BLD AUTO: 9.6 FL
POTASSIUM SERPL-SCNC: 4.1 MMOL/L
RBC # BLD: 3.78 10^6/ΜL
SODIUM BLD-SCNC: 138 MMOL/L
WBC # BLD: 7.3 10^3/ML

## 2019-11-20 LAB
BILIRUBIN, URINE: NEGATIVE
BLOOD, URINE: NEGATIVE
CLARITY: ABNORMAL
COLOR: YELLOW
GLUCOSE URINE: ABNORMAL
KETONES, URINE: NEGATIVE
LEUKOCYTE ESTERASE, URINE: POSITIVE
NITRITE, URINE: NEGATIVE
PH UA: 5 (ref 4.5–8)
PROTEIN UA: NEGATIVE
SPECIFIC GRAVITY, URINE: 1.02
UROBILINOGEN, URINE: NORMAL

## 2019-12-04 ENCOUNTER — OFFICE VISIT (OUTPATIENT)
Dept: GERIATRIC MEDICINE | Age: 84
End: 2019-12-04
Payer: MEDICARE

## 2019-12-04 DIAGNOSIS — N18.30 STAGE 3 CHRONIC KIDNEY DISEASE (HCC): ICD-10-CM

## 2019-12-04 DIAGNOSIS — I87.2 CHRONIC VENOUS INSUFFICIENCY: Primary | ICD-10-CM

## 2019-12-04 DIAGNOSIS — M15.9 OSTEOARTHRITIS OF MULTIPLE JOINTS, UNSPECIFIED OSTEOARTHRITIS TYPE: ICD-10-CM

## 2019-12-04 PROCEDURE — G8484 FLU IMMUNIZE NO ADMIN: HCPCS | Performed by: NURSE PRACTITIONER

## 2019-12-04 PROCEDURE — 1123F ACP DISCUSS/DSCN MKR DOCD: CPT | Performed by: NURSE PRACTITIONER

## 2019-12-04 PROCEDURE — 99308 SBSQ NF CARE LOW MDM 20: CPT | Performed by: NURSE PRACTITIONER

## 2019-12-29 VITALS
TEMPERATURE: 97.2 F | DIASTOLIC BLOOD PRESSURE: 52 MMHG | OXYGEN SATURATION: 93 % | SYSTOLIC BLOOD PRESSURE: 91 MMHG | HEART RATE: 80 BPM | WEIGHT: 131 LBS | BODY MASS INDEX: 27.38 KG/M2 | RESPIRATION RATE: 18 BRPM

## 2019-12-29 PROBLEM — M15.9 OSTEOARTHRITIS OF MULTIPLE JOINTS: Status: ACTIVE | Noted: 2019-07-22

## 2019-12-29 PROBLEM — N18.30 STAGE 3 CHRONIC KIDNEY DISEASE (HCC): Status: ACTIVE | Noted: 2019-12-29

## 2020-01-03 ENCOUNTER — OFFICE VISIT (OUTPATIENT)
Dept: GERIATRIC MEDICINE | Age: 85
End: 2020-01-03
Payer: MEDICARE

## 2020-01-03 PROCEDURE — 1123F ACP DISCUSS/DSCN MKR DOCD: CPT | Performed by: INTERNAL MEDICINE

## 2020-01-03 PROCEDURE — 99308 SBSQ NF CARE LOW MDM 20: CPT | Performed by: INTERNAL MEDICINE

## 2020-01-03 PROCEDURE — G8484 FLU IMMUNIZE NO ADMIN: HCPCS | Performed by: INTERNAL MEDICINE

## 2020-01-26 NOTE — PROGRESS NOTES
PATIENT: Mariajose Olea : 10/09/1926 DOS: 2020     [facility]    Seen for a routine monthly visit for her atrial fibrillation, hypertension, degenerative joint disease, heart failure, AFib. SUBJECTIVE:  This 80-year-old woman was evaluated in her room. The patient has been clinically stable, frail at her baseline. Oral intake has been good. The patient's weight has been maintained. No evidence of new acute pain crisis. REVIEW OF SYSTEMS:  Denied chest pain, palpitations. No constipation. OBJECTIVE:  She appeared clinically stable. Pupils are small, minimally reactive. Poor visual acuity. Oral mucosa is moist.  Chest showed no crackles, no wheezing. Cardiovascular exam showed a regular rate. Abdomen was soft, nontender. Extremities showed trace dorsal pedal pulse. ASSESSMENT AND PLAN:  1.   AFib:  The patient has been rate controlled, has been anticoagulated. No evidence of new RVR. 2.   Degenerative joint disease:  No new pain crisis. 3.   Hypertension:  Blood pressure is stable. We will monitor clinically.         Electronically Signed By: Kory Leonard M.D. on 2020 01:12:19  ______________________________  Kory Leonard M.D.  [id number]  D: 2020 23:10:25  T: 2020 23:52:28    cc: - [facility]

## 2020-01-28 RX ORDER — TRAMADOL HYDROCHLORIDE 50 MG/1
50 TABLET ORAL DAILY
COMMUNITY
End: 2020-01-28 | Stop reason: SDUPTHER

## 2020-01-28 RX ORDER — TRAMADOL HYDROCHLORIDE 50 MG/1
50 TABLET ORAL DAILY
Qty: 30 TABLET | Refills: 0 | Status: SHIPPED | OUTPATIENT
Start: 2020-01-28 | End: 2020-02-27

## 2020-02-04 ENCOUNTER — OFFICE VISIT (OUTPATIENT)
Dept: GERIATRIC MEDICINE | Age: 85
End: 2020-02-04
Payer: MEDICARE

## 2020-02-04 PROCEDURE — 99308 SBSQ NF CARE LOW MDM 20: CPT | Performed by: NURSE PRACTITIONER

## 2020-02-04 PROCEDURE — 1123F ACP DISCUSS/DSCN MKR DOCD: CPT | Performed by: NURSE PRACTITIONER

## 2020-02-04 PROCEDURE — G8484 FLU IMMUNIZE NO ADMIN: HCPCS | Performed by: NURSE PRACTITIONER

## 2020-02-23 VITALS
TEMPERATURE: 98 F | DIASTOLIC BLOOD PRESSURE: 47 MMHG | HEART RATE: 68 BPM | SYSTOLIC BLOOD PRESSURE: 120 MMHG | OXYGEN SATURATION: 95 %

## 2020-02-23 NOTE — PROGRESS NOTES
PATIENT: Nichelle Torres : 10/09/1926 DOS: 2020     [address]  Chief Complaint   Patient presents with    1 Month Follow-Up       REASON FOR VISIT: The patient being seen today for monthly visit for CHF, essential hypertension and muscle weakness. SUBJECTIVE: Resident offers no concerns. Nursing staff report resident remains at her baseline. FAMILY AND SOCIAL HISTORY: Refer to H&P. Past Medical History:   Diagnosis Date    Anxiety     Atrial fibrillation ([address]     Bilateral carotid artery stenosis 10/27/2017    Central retinal artery occlusion, right eye 10/17/2017    Chronic constipation     Chronic venous insufficiency     Congestive heart failure ([address] 2019    Dyslipidemia     Essential hypertension     Gastroesophageal reflux disease 2019    HTN (hypertension) 2013    Neurodermatitis     chronic, of bilateral lower extremities    Osteoarthritis 2019    Osteoporosis     Pap test, as part of routine gynecological examination     years ago   [facility] Screening mammogram     about 5 yrs ago    Seronegative rheumatoid arthritis ([address]     Stage 3 chronic kidney disease ([address] 2019    Status post total replacement of right hip 2017    Vision loss of right eye 10/17/2017    Overview:  - 80year old female who presents with PMH of HTN,AFIB(declined AC/aspirin),HLP presented to OSH yesterday evening with C/O right eye vision loss around 5 pm yesterday. Painless. - Had CT head without contrast at OSH ED that shows: No acute intracranial abnormality. 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 - EKG shows sinus bradycardia with PAC(R-58) - Concern for retinal artery occlusion?> 12 hours have passed. - Afib related?  Carotid bruit? - BP was 210/85 initially and then came down to 160/69 -Normal CRP - No other acute neuro deficit seen. -Spoke to Optho on JPQC([id number])- alon Villalta and he will arrange for pt to be seen today 1 month (around 3/4/2020) for chronic conditions. Electronically Signed By: Mariola Looney CNP on 02/21/2020 07:31:51  ______________________________  Mariola Looney CNP  QN/[id number]  D: 02/18/2020 23:14:18  T: 02/18/2020 23:59:19    cc: [facility]

## 2020-03-02 RX ORDER — TRAMADOL HYDROCHLORIDE 50 MG/1
50 TABLET ORAL DAILY
COMMUNITY
End: 2020-03-02 | Stop reason: SDUPTHER

## 2020-03-03 RX ORDER — TRAMADOL HYDROCHLORIDE 50 MG/1
50 TABLET ORAL DAILY
Qty: 7 TABLET | Refills: 0 | Status: SHIPPED | OUTPATIENT
Start: 2020-03-03 | End: 2020-03-09 | Stop reason: SDUPTHER

## 2020-03-05 ENCOUNTER — OFFICE VISIT (OUTPATIENT)
Dept: GERIATRIC MEDICINE | Age: 85
End: 2020-03-05
Payer: MEDICARE

## 2020-03-05 PROCEDURE — 1123F ACP DISCUSS/DSCN MKR DOCD: CPT | Performed by: INTERNAL MEDICINE

## 2020-03-05 PROCEDURE — G8484 FLU IMMUNIZE NO ADMIN: HCPCS | Performed by: INTERNAL MEDICINE

## 2020-03-05 PROCEDURE — 99309 SBSQ NF CARE MODERATE MDM 30: CPT | Performed by: INTERNAL MEDICINE

## 2020-03-09 RX ORDER — TRAMADOL HYDROCHLORIDE 50 MG/1
50 TABLET ORAL DAILY
Qty: 7 TABLET | Refills: 0 | Status: SHIPPED | OUTPATIENT
Start: 2020-03-09 | End: 2020-03-16

## 2020-03-12 VITALS — HEART RATE: 64 BPM | TEMPERATURE: 97 F | DIASTOLIC BLOOD PRESSURE: 71 MMHG | SYSTOLIC BLOOD PRESSURE: 114 MMHG

## 2020-03-12 NOTE — PROGRESS NOTES
PATIENT: Chandler Nayak : 10/09/1926 DOS: 2020     [facility]    Seen for a routine monthly visit for heart failure, atrial fibrillation and hypertension. SUBJECTIVE:  This 51-year-old woman was evaluated in her room. The patient has been clinically at her baseline. No evidence of new acute psychosis. No recent chest pain or palpitations. No change in her bowel or bladder habits. OBJECTIVE:  She has appeared chronically ill. She was afebrile at 97.0, pulse was 64 and blood pressure is 114/71. Normocephalic, atraumatic. Pupils are small, but they are reactive. Oral mucosa is moist.  Chest showed no crackles, no wheezing. Cardiovascular exam showed a regular rate. Abdomen was soft, nontender. ASSESSMENT AND PLAN:  1. Degenerative joint disease. Continue with anti-inflammatories. 2.   Heart failure. No evidence of acute fluid overload. Clinically stable. Monitoring weights. 3.   Hypertension. Blood pressure is stable. We will monitor.         Electronically Signed By: Estelle Soto M.D. on 2020 08:55:32  ______________________________  Estelle Soto M.D.  EF/[id number]  D: 2020 18:07:35  T: 2020 23:12:06    cc: [facility]

## 2020-03-18 RX ORDER — TRAMADOL HYDROCHLORIDE 50 MG/1
50 TABLET ORAL EVERY MORNING
COMMUNITY
End: 2020-03-18 | Stop reason: SDUPTHER

## 2020-03-18 RX ORDER — TRAMADOL HYDROCHLORIDE 50 MG/1
50 TABLET ORAL EVERY MORNING
Qty: 30 TABLET | Refills: 2 | Status: SHIPPED | OUTPATIENT
Start: 2020-03-18 | End: 2020-04-22 | Stop reason: SDUPTHER

## 2020-04-15 ENCOUNTER — VIRTUAL VISIT (OUTPATIENT)
Dept: GERIATRIC MEDICINE | Age: 85
End: 2020-04-15
Payer: MEDICARE

## 2020-04-15 PROCEDURE — 99309 SBSQ NF CARE MODERATE MDM 30: CPT | Performed by: NURSE PRACTITIONER

## 2020-04-15 PROCEDURE — 1123F ACP DISCUSS/DSCN MKR DOCD: CPT | Performed by: NURSE PRACTITIONER

## 2020-04-22 RX ORDER — TRAMADOL HYDROCHLORIDE 50 MG/1
50 TABLET ORAL 2 TIMES DAILY
Qty: 60 TABLET | Refills: 2 | Status: SHIPPED | OUTPATIENT
Start: 2020-04-22 | End: 2020-07-24 | Stop reason: SDUPTHER

## 2020-05-04 ENCOUNTER — OFFICE VISIT (OUTPATIENT)
Dept: GERIATRIC MEDICINE | Age: 85
End: 2020-05-04
Payer: MEDICARE

## 2020-05-04 LAB
VITAMIN D 25-HYDROXY: 61
VITAMIN D2, 25 HYDROXY: NORMAL
VITAMIN D3,25 HYDROXY: NORMAL

## 2020-05-04 PROCEDURE — 99309 SBSQ NF CARE MODERATE MDM 30: CPT | Performed by: INTERNAL MEDICINE

## 2020-05-04 PROCEDURE — 1123F ACP DISCUSS/DSCN MKR DOCD: CPT | Performed by: INTERNAL MEDICINE

## 2020-05-06 VITALS
DIASTOLIC BLOOD PRESSURE: 72 MMHG | TEMPERATURE: 97.6 F | RESPIRATION RATE: 18 BRPM | SYSTOLIC BLOOD PRESSURE: 110 MMHG | HEART RATE: 69 BPM | BODY MASS INDEX: 27.21 KG/M2 | OXYGEN SATURATION: 95 % | WEIGHT: 130.2 LBS

## 2020-05-06 NOTE — PROGRESS NOTES
other acute neuro deficit seen. -Spoke to Optho on [employer]YG([id number])- alon Nixraphael and he will arrange for pt to be seen today at [facility]. No acute intervention needed as pt well out of time frame(as per optho). Plan: - Admit to [facility] consult. Await optho input and consider further rx options after etiology is confirmed. - Fall precautions - Neuro checks - ECHO - Carotid doppler  Will monitor.  Vitamin D deficiency        MEDICATIONS AND ALLERGIES: Reviewed on chart in Epic. REVIEW OF SYSTEMS: Resident denies any headache, dizziness, blurred vision, chest pain, shortness of breath. She does report occasional cough. She denies any abdominal pain, nausea, or vomiting. She does complain of joint pain. PHYSICAL EXAMINATION: Most recent vital signs: /72, heart rate 69, respirations 18, pulse oxing 95% on room air, temp 97.6, weight 130.2. CONSTITUTIONAL: Resident is alert, elderly, frail female, in no apparent distress, pleasant and cooperative with exam. INTEGUMENT: Pink, dry. HEENT: Normocephalic, atraumatic in appearance. She is hard of hearing. Conjunctivae pink. Sclerae nonicteric. Mucous membranes pink, moist. NECK: With no visible masses. RESPIRATORY: Respirations even, nonlabored. No distress noted. PV: No edema noted. ASSESSMENT AND PLAN:   1.  Joint pain. I will increase the resident's tramadol to 50 mg p.o. q.12 and q.8 p.r.n.  2.  CHF. Resident is not exhibiting any signs or symptoms of fluid overload. We will continue to monitor closely. 3.  CKD. Resident's BUN 19, creatinine 0.8. I have reviewed this resident's medication and treatment plan, as well as, most recent lab work. No further changes will be made at this time. Return in about 1 month (around 5/15/2020), or if symptoms worsen or fail to improve, for chronic conditions.     Pursuant to the emergency declaration under the [facility] waiver authority and the

## 2020-05-11 LAB
ALBUMIN SERPL-MCNC: 4 G/DL
ALP BLD-CCNC: 67 U/L
ALT SERPL-CCNC: 8 U/L
ANION GAP SERPL CALCULATED.3IONS-SCNC: 2 MMOL/L
AST SERPL-CCNC: 14 U/L
BASOPHILS ABSOLUTE: NORMAL
BASOPHILS RELATIVE PERCENT: 0.6 %
BILIRUB SERPL-MCNC: 0.6 MG/DL (ref 0.1–1.4)
BUN BLDV-MCNC: 19 MG/DL
CALCIUM SERPL-MCNC: 9.7 MG/DL
CHLORIDE BLD-SCNC: 105 MMOL/L
CO2: 29 MMOL/L
CREAT SERPL-MCNC: 0.9 MG/DL
EOSINOPHILS ABSOLUTE: 0.2 /ΜL
EOSINOPHILS RELATIVE PERCENT: 3.2 %
GFR CALCULATED: 58
GLUCOSE BLD-MCNC: 97 MG/DL
HCT VFR BLD CALC: 39.6 % (ref 36–46)
HEMOGLOBIN: 13.1 G/DL (ref 12–16)
LYMPHOCYTES ABSOLUTE: 1.6 /ΜL
LYMPHOCYTES RELATIVE PERCENT: 23.2 %
MCH RBC QN AUTO: 32.1 PG
MCHC RBC AUTO-ENTMCNC: 33.2 G/DL
MCV RBC AUTO: 96.7 FL
MONOCYTES ABSOLUTE: 0.7 /ΜL
MONOCYTES RELATIVE PERCENT: 9.4 %
NEUTROPHILS ABSOLUTE: 4.4 /ΜL
NEUTROPHILS RELATIVE PERCENT: 63.6 %
PLATELET # BLD: 166 K/ΜL
PMV BLD AUTO: 10.7 FL
POTASSIUM SERPL-SCNC: 4.5 MMOL/L
RBC # BLD: 4.09 10^6/ΜL
SODIUM BLD-SCNC: 143 MMOL/L
TOTAL PROTEIN: 6
WBC # BLD: 7 10^3/ML

## 2020-05-12 ENCOUNTER — VIRTUAL VISIT (OUTPATIENT)
Dept: GERIATRIC MEDICINE | Age: 85
End: 2020-05-12
Payer: MEDICARE

## 2020-05-12 PROCEDURE — 99309 SBSQ NF CARE MODERATE MDM 30: CPT | Performed by: NURSE PRACTITIONER

## 2020-05-12 PROCEDURE — 1123F ACP DISCUSS/DSCN MKR DOCD: CPT | Performed by: NURSE PRACTITIONER

## 2020-05-27 NOTE — PROGRESS NOTES
capsule by mouth daily. 30 capsule 1    Multiple Vitamin (MULTIVITAMIN PO) Take  by mouth daily. No current facility-administered medications on file prior to visit. Social History     Tobacco Use    Smoking status: Never Smoker    Smokeless tobacco: Never Used   Substance Use Topics    Alcohol use: Yes     Comment: rarely    Drug use: No     Family History   Problem Relation Age of Onset   [place] Cancer Daughter         lung, smoker      Past Surgical History:   Procedure Laterality Date    [place] E[place] TRANSORAL BIOPSY SINGLE/MULTIPLE N/A 3/21/2018    EGD, LUIS TEST performed by Shaila Park MD at [address] [place] [address] Dr [address] 2009       S: Patient evaluated in her room today clinically stable has not had evidence of new URIs or febrile episodes patient remains in the [place] free environment and has had limited visitation. Patient's oral intake has been good no significant weight loss mood state has been stable no evidence of acute depressive symptoms or acute psychosis. Patient is a diabetic blood sugars have been reviewed with nursing staff no evidence of new acute symptomatic hypoglycemia. ROS: No chest pain palpitations nausea vomiting no emesis fevers or chills  O: Vital signs stable nursing notes reviewed  Pupils are small minimally reactive no new visual field deficits normocephalic atraumatic neck was supple no nuchal rigidity no thyromegaly chest showed no crackles no wheezing cardiovascular showed a regular rate abdomen soft nontender neurological rigidity no pulsatile masses noted extremities had +1 dorsal pedal pulse skin showed no evidence of new rash  No results found for: LABA1C  No results found for: EAG  A/P:   Diagnosis Orders   1. Diabetes mellitus without complication ([address]     2. Essential hypertension     3.  Chronic atrial fibrillation       Monitor blood sugars closely check A1c lipid panel urine for microalbumin

## 2020-06-02 ENCOUNTER — VIRTUAL VISIT (OUTPATIENT)
Dept: GERIATRIC MEDICINE | Age: 85
End: 2020-06-02
Payer: MEDICARE

## 2020-06-02 PROCEDURE — 99308 SBSQ NF CARE LOW MDM 20: CPT | Performed by: NURSE PRACTITIONER

## 2020-06-02 PROCEDURE — 1123F ACP DISCUSS/DSCN MKR DOCD: CPT | Performed by: NURSE PRACTITIONER

## 2020-06-03 VITALS
BODY MASS INDEX: 25.87 KG/M2 | RESPIRATION RATE: 16 BRPM | OXYGEN SATURATION: 90 % | TEMPERATURE: 97.2 F | WEIGHT: 123.8 LBS | HEART RATE: 88 BPM | SYSTOLIC BLOOD PRESSURE: 115 MMHG | DIASTOLIC BLOOD PRESSURE: 84 MMHG

## 2020-06-03 NOTE — PROGRESS NOTES
PATIENT: Dipesh Johnson : 10/09/1926 DOS: 2020     [address]  Chief Complaint   Patient presents with    Weight Loss       SUBJECTIVE: The patient is being seen today for acute visit for abnormal weight loss. She has lost 7 pounds in the past month. She states that she is having difficulty chewing and swallowing food due to not having her bottom denture. She is on a modified diet. Otherwise, no concerns. Nursing staff report resident remains at her baseline. FAMILY AND SOCIAL HISTORY: Refer to H&P. Past Medical History:   Diagnosis Date    Anxiety     Atrial fibrillation ([address]     Bilateral carotid artery stenosis 10/27/2017    Central retinal artery occlusion, right eye 10/17/2017    Chronic constipation     Chronic venous insufficiency     Congestive heart failure ([address] 2019    Dyslipidemia     Essential hypertension     Gastroesophageal reflux disease 2019    HTN (hypertension) 2013    Neurodermatitis     chronic, of bilateral lower extremities    Osteoarthritis 2019    Osteoporosis     Pap test, as part of routine gynecological examination     years ago   Debbie Aislinn Screening mammogram     about 5 yrs ago    Seronegative rheumatoid arthritis ([address]     Stage 3 chronic kidney disease ([address] 2019    Status post total replacement of right hip 2017    Vision loss of right eye 10/17/2017    Overview:  - 80year old female who presents with PMH of HTN,AFIB(declined AC/aspirin),HLP presented to OSH yesterday evening with C/O right eye vision loss around 5 pm yesterday. Painless. - Had CT head without contrast at OSH ED that shows: No acute intracranial abnormality. 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 - EKG shows sinus bradycardia with PAC(R-58) - Concern for retinal artery occlusion?> 12 hours have passed. - Afib related?  Carotid bruit? - BP was 210/85 initially and then came down to 160/69 -Normal CRP - No other at this time. Return in about 1 month (around 6/12/2020), or if symptoms worsen or fail to improve, for chronic conditions. Pursuant to the emergency declaration under the [address] [place] [address] waiver authority and the [employer] Act, this Virtual Visit was conducted, with patient's consent, to reduce the patient's risk of exposure to COVID-19 and provide continuity of care for an established patient. Services were provided through a video synchronous discussion virtually to substitute for in-person clinic visit they will be billed for this visit and they agree to continue with assistance of  staff and via VIRTUAL platform     Patient identification was verified at the start of the visit : YES    Total time spent on this encounter: Not billed by time. Electronically Signed By: Rajni Stafford CNP on 06/02/2020 12:08:32  ______________________________  Rajni Stafford CNP  OS/[id number]  D: 06/02/2020 02:16:50  T: 06/02/2020 03:59:14    cc: - [facility]

## 2020-06-08 PROBLEM — R63.4 ABNORMAL WEIGHT LOSS: Status: ACTIVE | Noted: 2020-06-08

## 2020-06-17 ENCOUNTER — VIRTUAL VISIT (OUTPATIENT)
Dept: GERIATRIC MEDICINE | Age: 85
End: 2020-06-17
Payer: MEDICARE

## 2020-06-17 PROCEDURE — 1123F ACP DISCUSS/DSCN MKR DOCD: CPT | Performed by: NURSE PRACTITIONER

## 2020-06-17 PROCEDURE — 99308 SBSQ NF CARE LOW MDM 20: CPT | Performed by: NURSE PRACTITIONER

## 2020-06-18 LAB
BASOPHILS ABSOLUTE: ABNORMAL
BASOPHILS RELATIVE PERCENT: 0.6 %
EOSINOPHILS ABSOLUTE: 0.2 /ΜL
EOSINOPHILS RELATIVE PERCENT: 3.3 %
HCT VFR BLD CALC: 4.7 % (ref 36–46)
HEMOGLOBIN: 13.7 G/DL (ref 12–16)
LYMPHOCYTES ABSOLUTE: 1.6 /ΜL
LYMPHOCYTES RELATIVE PERCENT: 22.3 %
MCH RBC QN AUTO: 33.3 PG
MCHC RBC AUTO-ENTMCNC: 33.7 G/DL
MCV RBC AUTO: 98.7 FL
MONOCYTES ABSOLUTE: 0.6 /ΜL
MONOCYTES RELATIVE PERCENT: 9 %
NEUTROPHILS ABSOLUTE: 4.6 /ΜL
NEUTROPHILS RELATIVE PERCENT: 64.8 %
PLATELET # BLD: 150 K/ΜL
PMV BLD AUTO: 10.8 FL
RBC # BLD: 4.13 10^6/ΜL
WBC # BLD: 7.1 10^3/ML

## 2020-06-28 NOTE — PROGRESS NOTES
to 160/69 -Normal CRP - No other acute neuro deficit seen. -Spoke to Optho on UPOZ([id number])- alon Villalta and he will arrange for pt to be seen today at [facility]. No acute intervention needed as pt well out of time frame(as per optho). Plan: - Admit to [facility] consult. Await optho input and consider further rx options after etiology is confirmed. - Fall precautions - Neuro checks - ECHO - Carotid doppler  Will monitor.  Vitamin D deficiency        MEDICATIONS AND ALLERGIES: Reviewed on chart in Epic. REVIEW OF SYSTEMS: Resident denies any headache, dizziness, blurred vision, chest pain, shortness of breath, abdominal pain. She did have some stomach discomfort earlier, but she is okay now. She denies any nausea, vomiting, or changes in her bowel or bladder habits. PHYSICAL EXAMINATION: Most recent vital signs: /78, temp 96.7, heart rate 66, respiration's 18, pulse oxing 95% on room air, weight 124.2. CONSTITUTIONAL: Resident is alert, elderly, frail female, in no apparent distress, pleasant and cooperative with exam. She is hard of hearing. Conjunctiva pink. Sclerae nonicteric. Mucous membranes pink, moist. NECK: With no visible masses. RESPIRATORY: Respiration's even, non-labored. No distress noted. ABDOMEN: Flat. PERIPHERAL VASCULAR: No edema noted. ASSESSMENT AND PLAN:   1. Abnormal weight loss. Resident is down an additional 3 pounds. She is a social eater. However, she is still being restricted to her room for her meals due to the current viral pandemic restrictions. The dietitian is following her and nursing staff are assisting with feeding if necessary and queuing her. We will continue to monitor her and encourage oral intake, as well as monitor her labs. I have reviewed this resident's medication and treatment plan, as well as, most recent lab work. No further changes will be made at this time.      Return in about 1 month (around 7/2/2020), or if symptoms worsen or

## 2020-07-01 ENCOUNTER — OFFICE VISIT (OUTPATIENT)
Dept: GERIATRIC MEDICINE | Age: 85
End: 2020-07-01
Payer: MEDICARE

## 2020-07-01 PROCEDURE — 1123F ACP DISCUSS/DSCN MKR DOCD: CPT | Performed by: INTERNAL MEDICINE

## 2020-07-01 PROCEDURE — 99309 SBSQ NF CARE MODERATE MDM 30: CPT | Performed by: INTERNAL MEDICINE

## 2020-07-07 NOTE — PROGRESS NOTES
[address], [address]  Phone: [phone]  Fax: [phone]  PATIENT: Jessi Green : 10/09/1926 DOS:   [facility]  Chief Complaint   Patient presents with    Bleeding/Bruising       REASON FOR VISIT: The patient being seen today for acute visit for ecchymosis, lower aspect  of right eye. SUBJECTIVE: Resident reports she has no idea how she bruised her eye, she just woke up that  way. She denies any injury, scratching it against anything, hitting it, rubbing it. She reports that it  does not hurt. Otherwise no concerns. FAMILY AND SOCIAL HISTORY: Refer to H&P. Past Medical History:   Diagnosis Date    Anxiety     Atrial fibrillation ([address]     Bilateral carotid artery stenosis 10/27/2017    Central retinal artery occlusion, right eye 10/17/2017    Chronic constipation     Chronic venous insufficiency     Congestive heart failure ([address] 2019    Dyslipidemia     Essential hypertension     Gastroesophageal reflux disease 2019    HTN (hypertension) 2013    Neurodermatitis     chronic, of bilateral lower extremities    Osteoarthritis 2019    Osteoporosis     Pap test, as part of routine gynecological examination     years ago   Tanya Craze Screening mammogram     about 5 yrs ago    Seronegative rheumatoid arthritis ([address]     Stage 3 chronic kidney disease ([address] 2019    Status post total replacement of right hip 2017    Vision loss of right eye 10/17/2017    Overview:  - 80year old female who presents with PMH of HTN,AFIB(declined AC/aspirin),HLP presented to OSH yesterday evening with C/O right eye vision loss around 5 pm yesterday. Painless. - Had CT head without contrast at OSH ED that shows: No acute intracranial abnormality. 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  - EKG shows sinus bradycardia with PAC(R-58) - Concern for retinal artery occlusion?> 12 hours have passed. - Afib related? Carotid bruit? - BP was 210/85 initially and then came down to 160/69 -Normal CRP - No other acute neuro deficit seen. -Spoke to Optho on KTWX([id number])- alon Villalta and he will arrange for pt to be seen today at [facility]. No acute intervention needed as pt well out of time frame(as per optho). Plan: - Admit to [facility] consult. Await optho input and consider further rx options after etiology is confirmed. - Fall precautions - Neuro checks - ECHO - Carotid doppler  Will monitor.  Vitamin D deficiency        MEDICATIONS AND ALLERGIES: Reviewed on chart in Epic. REVIEW OF SYSTEMS: Resident denies any headache, dizziness, blurred vision, chest pain,  shortness of breath, abdominal pain, nausea, vomiting, or changes in her bowel or bladder habits. She states she is eating well, sleeping well and has no pain. She does report that she woke up  with her eye bruised. She has no idea how it occurred, that she just woke up that way. She  denies any injury or trauma. She states she has not been rubbing it. She did not hit it on  anything. She is not having any pain. Otherwise, no concerns. PHYSICAL EXAMINATION: Most recent vital signs /78, temp 96.1, heart rate 66,  respirations 16, pulse oxing 96% on room air. Weight 125.4. Constitutional: Resident is alert,  elderly, frail female, in no apparent distress. Integument: Pink, dry. HEENT: Conjunctivae  pink. Sclerae nonicteric. Mucous membranes pink, moist. Neck with no visible masses. Cardiovascular: Heart rate is regular per nursing staff. Respiratory: Lung sounds are clear  throughout all fields per nursing staff. Respirations even and nonlabored. No distress noted. Abdomen: Soft, flat per nursing staff. PV: No edema noted. ASSESSMENT AND PLAN:  1. Ecchymosis, lower aspect, right eye. CBC with diff in a.m.  Otherwise I will have nursing  staff continue to monitor. No further changes will be made at this time. Return in about 1 month (around 2020), or if symptoms worsen or fail to improve, for chronic conditions. Pursuant to the emergency declaration under the [facility] waiver authority and the [employer] Act, this Virtual Visit was conducted, with patient's consent, to reduce the patient's risk of exposure to COVID-19 and provide continuity of care for an established patient. Services were provided through a video synchronous discussion virtually to substitute for in-person clinic visit they will be billed for this visit and they agree to continue with assistance of [facility] staff and via VIRTUAL platform     Patient identification was verified at the start of the visit : YES    Total time spent on this encounter: Not billed by time. Electronically Signed By: Larisa Perry CNP on 2020 06:08:36  ______________________________  Larisa Perry CNP  [id number]  D: 2020 09:12:41  T: 2020 18:19:42  [address]  [facility] [place] [address]  Phone: [phone]  Fax: [phone]  PATIENT: Lanny Dow : 10/09/1928 DOS:   A [facility] ministry serving [place]  cc: - [facility]

## 2020-07-24 RX ORDER — TRAMADOL HYDROCHLORIDE 50 MG/1
50 TABLET ORAL 2 TIMES DAILY
Qty: 60 TABLET | Refills: 0 | Status: SHIPPED | OUTPATIENT
Start: 2020-07-24 | End: 2020-08-20 | Stop reason: SDUPTHER

## 2020-07-30 NOTE — PROGRESS NOTES
for pt to be seen today at [facility]. No acute intervention needed as pt well out of time frame(as per optho). Plan: - Admit to [facility] consult. Await optho input and consider further rx options after etiology is confirmed. - Fall precautions - Neuro checks - ECHO - Carotid doppler  Will monitor.     Vitamin D deficiency            Past Surgical History:   Procedure Laterality Date    [place] EGD TRANSORAL BIOPSY SINGLE/MULTIPLE N/A 3/21/2018    EGD, LUIS TEST performed by Valentine Wilder MD at [address]    [address] ARTHROPLASTY Right 2009         Current Outpatient Medications on File Prior to Visit   Medication Sig Dispense Refill    PARoxetine (PAXIL) 10 MG tablet Take 1 tablet by mouth daily 30 tablet 3    albuterol (PROVENTIL) (2.5 MG/3ML) 0.083% nebulizer solution Take 3 mLs by nebulization 3 times daily for 5 days 120 each 3    acetaminophen (TYLENOL) 650 MG suppository Place 650 mg rectally every 4 hours as needed for Fever      pantoprazole (PROTONIX) 40 MG tablet Take 1 tablet by mouth every morning (before breakfast) 30 tablet 3    vitamin C (ASCORBIC ACID) 500 MG tablet Take 500 mg by mouth daily      magnesium hydroxide (MILK OF MAGNESIA) 400 MG/5ML suspension Take 30 mLs by mouth daily as needed for Constipation      aluminum-magnesium hydroxide 200-200 MG/5ML suspension Take 30 mLs by mouth every 4 hours as needed for Indigestion      artificial tears 0.1-0.3 % SOLN ophthalmic solution Place 2 drops into both eyes 3 times daily 1 Bottle 0    acetaminophen (TYLENOL) 325 MG tablet Take 650 mg by mouth every 4 hours as needed for Pain      fluticasone (FLONASE) 50 MCG/ACT nasal spray 1 spray by Nasal route daily 16 g 11    diltiazem (CARDIZEM) 60 MG tablet Take 1 tablet by mouth 3 times daily 120 tablet 5    metoprolol succinate (TOPROL XL) 25 MG extended release tablet TAKE 1 TABLET BY MOUTH ONCE DAILY 30 tablet 5    docusate sodium (COLACE, DULCOLAX) 100 MG CAPS Take 100 mg by mouth 2 times daily 30 capsule 0    atorvastatin (LIPITOR) 40 MG tablet Take 1 tablet by mouth daily 90 tablet 1    rivaroxaban (XARELTO) 15 MG TABS tablet Take 1 tablet by mouth daily (with breakfast) 90 tablet 3    AMITIZA 8 MCG CAPS capsule TAKE 1 CAPSULE BY MOUTH ONCE DAILY 30 capsule 3    fluocinonide (LIDEX) 0.05 % cream Apply topically 2 times daily as needed (itching) 15 g 3    Lactobacillus (PROBIOTIC ACIDOPHILUS) TABS Take 1 tablet by mouth 2 times daily 60 tablet 3    [DISCONTINUED] diltiazem (CARDIZEM CD) 180 MG ER capsule Take 1 capsule by mouth daily. 30 capsule 1    Multiple Vitamin (MULTIVITAMIN PO) Take  by mouth daily. No current facility-administered medications on file prior to visit. Allergies   Allergen Reactions    Cephalexin     Cipro Xr     Doxycycline     Meprobamate     Penicillins     Relafen [Nabumetone]     Nsaids      Upset stomach at times. Family History   Problem Relation Age of Onset    Cancer Daughter         lung, smoker          Physical Exam:      Physical Exam    not currently breastfeeding.       .   Lab Results   Component Value Date    WBC 7.1 06/18/2020    HGB 13.7 06/18/2020    HCT 4.7 (A) 06/18/2020    MCV 98.7 06/18/2020     06/18/2020     Lab Results   Component Value Date     05/11/2020    K 4.5 05/11/2020    K 3.4 03/21/2018     05/11/2020    CO2 29 05/11/2020    BUN 19 05/11/2020    CREATININE 0.9 05/11/2020    GLUCOSE 97 05/11/2020    CALCIUM 9.7 05/11/2020                ASSESSMENT:  Patient Active Problem List   Diagnosis    Vitamin D deficiency    Dyslipidemia    Osteoporosis    Chronic constipation    Neurodermatitis    Chronic venous insufficiency    Anxiety    Seronegative rheumatoid arthritis ([facility] [address]    Essential hypertension    Peripheral edema    Atrial fibrillation (HCC)    Status post total replacement of right hip    Varicose

## 2020-08-05 ENCOUNTER — OFFICE VISIT (OUTPATIENT)
Dept: GERIATRIC MEDICINE | Age: 85
End: 2020-08-05
Payer: MEDICARE

## 2020-08-05 PROCEDURE — 1123F ACP DISCUSS/DSCN MKR DOCD: CPT | Performed by: NURSE PRACTITIONER

## 2020-08-05 PROCEDURE — 99309 SBSQ NF CARE MODERATE MDM 30: CPT | Performed by: NURSE PRACTITIONER

## 2020-08-20 RX ORDER — TRAMADOL HYDROCHLORIDE 50 MG/1
50 TABLET ORAL 2 TIMES DAILY
Qty: 60 TABLET | Refills: 0 | Status: SHIPPED | OUTPATIENT
Start: 2020-08-20 | End: 2020-09-17 | Stop reason: SDUPTHER

## 2020-08-31 VITALS
OXYGEN SATURATION: 96 % | BODY MASS INDEX: 25.9 KG/M2 | RESPIRATION RATE: 18 BRPM | SYSTOLIC BLOOD PRESSURE: 94 MMHG | HEART RATE: 91 BPM | DIASTOLIC BLOOD PRESSURE: 65 MMHG | TEMPERATURE: 97.4 F | WEIGHT: 123.9 LBS

## 2020-08-31 NOTE — PROGRESS NOTES
PATIENT: Maame Perry : 10/09/1926 DOS: 2020     [address]  Chief Complaint   Patient presents with    1 Month Follow-Up       The patient is being seen today for monthly visit for hypertension, Afib and CKD. SUBJECTIVE:  Resident officers no concerns. She did state she had some stomach pain yesterday, but she has been okay today. Otherwise, she is doing well. FAMILY AND SOCIAL HISTORY:  Refer to H&P. Past Medical History:   Diagnosis Date    Anxiety     Atrial fibrillation ([address]     Bilateral carotid artery stenosis 10/27/2017    Central retinal artery occlusion, right eye 10/17/2017    Chronic constipation     Chronic venous insufficiency     Congestive heart failure ([address] 2019    Dyslipidemia     Essential hypertension     Gastroesophageal reflux disease 2019    HTN (hypertension) 2013    Neurodermatitis     chronic, of bilateral lower extremities    Osteoarthritis 2019    Osteoporosis     Pap test, as part of routine gynecological examination     years ago   [facility]     about 5 yrs ago    Seronegative rheumatoid arthritis ([address]     Stage 3 chronic kidney disease ([address] 2019    Status post total replacement of right hip 2017    Vision loss of right eye 10/17/2017    Overview:  - 80year old female who presents with PMH of HTN,AFIB(declined AC/aspirin),HLP presented to OSH yesterday evening with C/O right eye vision loss around 5 pm yesterday. Painless. - Had CT head without contrast at OSH ED that shows: No acute intracranial abnormality. 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 - EKG shows sinus bradycardia with PAC(R-58) - Concern for retinal artery occlusion?> 12 hours have passed. - Afib related?  Carotid bruit? - BP was 210/85 initially and then came down to 160/69 -Normal CRP - No other acute neuro deficit seen. -Spoke to Optho on PLZP([id number])- alon Villalta and he will arrange for pt to be seen today at [facility]. No acute intervention needed as pt well out of time frame(as per optho). Plan: - Admit to [facility] consult. Await optho input and consider further rx options after etiology is confirmed. - Fall precautions - Neuro checks - ECHO - Carotid doppler  Will monitor.  Vitamin D deficiency        MEDICATIONS AND ALLERGIES:  Reviewed on chart at nursing facility. REVIEW OF SYSTEMS:  Resident denies dizziness, blurred vision, chest pain, shortness of breath, abdominal pain, nausea, vomiting, or changes in her bowel or bladder habits. She reports she is sleeping well, eating well and has no pain. PHYSICAL EXAMINATION:  Most recent vital signs, blood pressure 94/65. I will have nursing staff recheck. Temp 97.4, heart rate 91, respirations 18, pulse oxing 96% on room air and weight 123.9. CONSTITUTIONAL:  Resident is alert, elderly, frail female, in no apparent distress, pleasant and cooperative with exam.  INTEGUMENT:  Pale, pink, warm, dry. HEENT:  Normocephalic, atraumatic in appearance. She is hard of hearing. Conjunctivae pink. Sclerae nonicteric. Mucous membranes pink moist.  No oropharyngeal exudate. CV:  Regular rate and rhythm. No murmurs, gallops or rubs noted. RESPIRATORY:  lung sounds clear throughout all fields. Respirations even and nonlabored. ABDOMEN:  Soft, nontender, nondistended, normoactive bowel sounds. PV:  Peripheral pulses present. No edema noted. ASSESSMENT AND PLAN:  1. Hypertension. Patient's blood pressure is running on the low side. I did place parameters on the resident's metoprolol. We will continue to monitor closely and adjust her medication as needed. 2.   Afib. We will continue her rivaroxaban as ordered. She has not had any chest pain or chest discomfort. 3.   CKD. Resident's BUN 3.1 and creatinine 1.0. We will continue to monitor closely.     I have reviewed this resident's medication and treatment plan, as well as most recent laboratory work. No further changes will be made at this time. Return in about 1 month (around 9/5/2020), or if symptoms worsen or fail to improve, for chronic conditions. Electronically Signed By: Columba Baptiste CNP on 08/28/2020 07:50:39  ______________________________  Columba Baptiste CNP  [id number]  D: 08/26/2020 12:48:55  T: 08/26/2020 23:01:49    cc: [facility]

## 2020-09-10 ENCOUNTER — OFFICE VISIT (OUTPATIENT)
Dept: GERIATRIC MEDICINE | Age: 85
End: 2020-09-10
Payer: MEDICARE

## 2020-09-10 PROCEDURE — 1123F ACP DISCUSS/DSCN MKR DOCD: CPT | Performed by: INTERNAL MEDICINE

## 2020-09-10 PROCEDURE — 99309 SBSQ NF CARE MODERATE MDM 30: CPT | Performed by: INTERNAL MEDICINE

## 2020-09-17 RX ORDER — TRAMADOL HYDROCHLORIDE 50 MG/1
50 TABLET ORAL 2 TIMES DAILY
Qty: 60 TABLET | Refills: 0 | Status: SHIPPED | OUTPATIENT
Start: 2020-09-17 | End: 2020-10-20 | Stop reason: SDUPTHER

## 2020-10-01 ENCOUNTER — OFFICE VISIT (OUTPATIENT)
Dept: GERIATRIC MEDICINE | Age: 85
End: 2020-10-01
Payer: MEDICARE

## 2020-10-01 PROCEDURE — 1123F ACP DISCUSS/DSCN MKR DOCD: CPT | Performed by: NURSE PRACTITIONER

## 2020-10-01 PROCEDURE — G8484 FLU IMMUNIZE NO ADMIN: HCPCS | Performed by: NURSE PRACTITIONER

## 2020-10-01 PROCEDURE — 99308 SBSQ NF CARE LOW MDM 20: CPT | Performed by: NURSE PRACTITIONER

## 2020-10-11 NOTE — PROGRESS NOTES
Patient Name: Arianna Betts  Date: 10/10/2020  YOB: 1926  Medical Record Number: [id number]              History of Present Illness:      Review of Systems    Review of Systems: All 14 review of systems negative other than as stated above    Social History     Tobacco Use    Smoking status: Never Smoker    Smokeless tobacco: Never Used   Substance Use Topics    Alcohol use: Yes     Comment: rarely    Drug use: No         Past Medical History:   Diagnosis Date    Anxiety     Atrial fibrillation ([address]     Bilateral carotid artery stenosis 10/27/2017    Central retinal artery occlusion, right eye 10/17/2017    Chronic constipation     Chronic venous insufficiency     Congestive heart failure ([address] 9/29/2019    Dyslipidemia     Essential hypertension     Gastroesophageal reflux disease 9/29/2019    HTN (hypertension) 6/29/2013    Neurodermatitis     chronic, of bilateral lower extremities    Osteoarthritis 7/22/2019    Osteoporosis     Pap test, as part of routine gynecological examination     years [facility]     [facility]    Seronegative rheumatoid arthritis ([address]     Stage 3 chronic kidney disease ([address] 12/29/2019    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 Painless. - Had CT head without contrast at OSH ED that shows: No acute intracranial abnormality. 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 - EKG shows sinus bradycardia with PAC(R-58) - Concern for retinal artery occlusion?> 12 hours have passed. - Afib related?  Carotid bruit? - BP was 210/85 initially and then came down to 160/69 -Normal CRP - No other acute neuro deficit seen. -Spoke to [facility] [employer] on JESSICA([id number])- alon Villalta and he will arrange for pt to be seen today at [facility]. No acute intervention needed as pt well out of time frame(as per optho). Plan: - Admit to [facility] consult. Await optho input and consider further rx options after etiology is confirmed. - Fall precautions - Neuro checks - ECHO - Carotid doppler  Will monitor.     Vitamin D deficiency            Past Surgical History:   Procedure Laterality Date    SC EGD TRANSORAL BIOPSY SINGLE/MULTIPLE N/A 3/21/2018    EGD, LUIS TEST performed by Reginald Tipton MD at [address] Right    [address] ARTHROPLASTY Right 2009         Current Outpatient Medications on File Prior to Visit   Medication Sig Dispense Refill    PARoxetine (PAXIL) 10 MG tablet Take 1 tablet by mouth daily 30 tablet 3    albuterol (PROVENTIL) (2.5 MG/3ML) 0.083% nebulizer solution Take 3 mLs by nebulization 3 times daily for 5 days 120 each 3    acetaminophen (TYLENOL) 650 MG suppository Place 650 mg rectally every 4 hours as needed for Fever      pantoprazole (PROTONIX) 40 MG tablet Take 1 tablet by mouth every morning (before breakfast) 30 tablet 3    vitamin C (ASCORBIC ACID) 500 MG tablet Take 500 mg by mouth daily      magnesium hydroxide (MILK OF MAGNESIA) 400 MG/5ML suspension Take 30 mLs by mouth daily as needed for Constipation      aluminum-magnesium hydroxide 200-200 MG/5ML suspension Take 30 mLs by mouth every 4 hours as needed for Indigestion      artificial tears 0.1-0.3 % SOLN ophthalmic solution Place 2 drops into both eyes 3 times daily 1 Bottle 0    acetaminophen (TYLENOL) 325 MG tablet Take 650 mg by mouth every 4 hours as needed for Pain      fluticasone (FLONASE) 50 MCG/ACT nasal spray 1 spray by Nasal route daily 16 g 11    diltiazem (CARDIZEM) 60 MG tablet Take 1 tablet by mouth 3 times daily 120 tablet 5    metoprolol succinate (TOPROL XL) 25 MG extended release tablet TAKE 1 TABLET BY MOUTH ONCE DAILY 30 tablet 5    docusate sodium (COLACE, DULCOLAX) 100 MG CAPS Take 100 mg by mouth 2 times daily 30 capsule 0    atorvastatin (LIPITOR) 40 MG tablet Take 1 tablet by mouth daily 90 tablet 1    rivaroxaban (XARELTO) 15 MG TABS tablet Take 1 tablet by mouth daily (with breakfast) 90 tablet 3    AMITIZA 8 MCG CAPS capsule TAKE 1 CAPSULE BY MOUTH ONCE DAILY 30 capsule 3    fluocinonide (LIDEX) 0.05 % cream Apply topically 2 times daily as needed (itching) 15 g 3    Lactobacillus (PROBIOTIC ACIDOPHILUS) TABS Take 1 tablet by mouth 2 times daily 60 tablet 3    [DISCONTINUED] diltiazem (CARDIZEM CD) 180 MG ER capsule Take 1 capsule by mouth daily. 30 capsule 1    Multiple Vitamin (MULTIVITAMIN PO) Take  by mouth daily. No current facility-administered medications on file prior to visit. Allergies   Allergen Reactions    Cephalexin     Cipro Xr     Doxycycline     Meprobamate     Penicillins     Relafen [Nabumetone]     Nsaids      Upset stomach at times. Family History   Problem Relation Age of Onset    Cancer Daughter         lung, smoker          Physical Exam:      Physical Exam    not currently breastfeeding.       .   Lab Results   Component Value Date    WBC 7.1 06/18/2020    HGB 13.7 06/18/2020    HCT 4.7 (A) 06/18/2020    MCV 98.7 06/18/2020     06/18/2020     Lab Results   Component Value Date     05/11/2020    K 4.5 05/11/2020    K 3.4 03/21/2018     05/11/2020    CO2 29 05/11/2020    BUN 19 05/11/2020    CREATININE 0.9 05/11/2020    GLUCOSE 97 05/11/2020    CALCIUM 9.7 05/11/2020                ASSESSMENT:  Patient Active Problem List   Diagnosis    Vitamin D deficiency    Dyslipidemia    Osteoporosis    Chronic constipation    Neurodermatitis    Chronic venous insufficiency    Anxiety    Seronegative rheumatoid arthritis ([address] [facility] [address]    Essential hypertension    Peripheral edema    Atrial fibrillation (HCC)    Status post total replacement of right hip    Varicose veins of both lower extremities    Bruit of left carotid artery    Central retinal artery occlusion, right eye    Vision loss of right eye    Bilateral carotid artery stenosis    Cellulitis of right leg    Radial head fracture, closed, left, closed, initial encounter    Closed right radial fracture, closed, initial encounter    Radial head fracture, closed, right, closed, initial encounter    Fever    Gastritis    Hiatal hernia    Dysphagia    History of urinary retention    Hx of falling    Muscle weakness    Osteoarthritis of multiple joints    Gastroesophageal reflux disease    Congestive heart failure (HCC)    Stage 3 chronic kidney disease    Abnormal weight loss         PLAN:

## 2020-10-21 RX ORDER — TRAMADOL HYDROCHLORIDE 50 MG/1
50 TABLET ORAL 2 TIMES DAILY
Qty: 60 TABLET | Refills: 0 | Status: SHIPPED | OUTPATIENT
Start: 2020-10-21 | End: 2020-11-20 | Stop reason: SDUPTHER

## 2020-10-26 VITALS
RESPIRATION RATE: 18 BRPM | TEMPERATURE: 97 F | DIASTOLIC BLOOD PRESSURE: 82 MMHG | HEART RATE: 78 BPM | BODY MASS INDEX: 25.58 KG/M2 | SYSTOLIC BLOOD PRESSURE: 116 MMHG | WEIGHT: 122.4 LBS | OXYGEN SATURATION: 97 %

## 2020-10-26 NOTE — PROGRESS NOTES
alon Villalta and he will arrange for pt to be seen today at [facility]. No acute intervention needed as pt well out of time frame(as per optho). Plan: - Admit to [facility] consult. Await optho input and consider further rx options after etiology is confirmed. - Fall precautions - Neuro checks - ECHO - Carotid doppler  Will monitor.  Vitamin D deficiency        REVIEW OF SYSTEMS:  Reviewed on chart at nursing facility. REVIEW OF SYSTEMS:  Resident denies any headache, dizziness, blurred vision, chest pain, shortness of breath, abdominal pain, nausea and vomiting. She states she is eating okay, sleeping okay. She does report that she had stomach pain yesterday, however, that has resolved. She reports she is currently happy and she likes to watch TV.    PHYSICAL EXAMINATION:  Most recent vital signs /94, recheck 116/82, temp 97.0, heart rate 78, respirations 18, pulse oxing 97% on room air. Weight 122.4. Constitutional:  Resident is alert, elderly female, hard of hearing, in no apparent distress. Integument:  Pink, warm and dry. HEENT:  Normocephalic, atraumatic. Conjunctivae pink. Sclerae nonicteric. Mucous membranes pink, moist.  No oropharyngeal exudates. She is hard of hearing. Neck:  Supple. No cervical or clavicular lymphadenopathy. Cardiovascular:  Regular rate and rhythm. No murmurs, gallops or rubs noted. Respiratory:  lung sounds clear throughout all fields. Respirations even and nonlabored. Abdomen:  Soft, nontender, nondistended, normoactive bowel sounds. PV:  Peripheral pulses present. No edema noted. ASSESSMENT AND PLAN:  1.   CHF. The resident has not had any signs or symptoms of fluid overload. We will continue to monitor closely. 2.   Generalized anxiety. Resident's mood is stable. We will continue her Paxil as ordered. 3.   Essential hypertension.   Resident's blood pressure was elevated, however, after her blood pressure medication her blood pressure did come down to an acceptable level. We will continue her metoprolol and diltiazem as ordered and adjust as needed. 4.   Depression. Resident appears to be in a good mood at the time of my visit. She states she is happy and she would like to watch TV. I have reviewed this resident's medication and treatment plan as well as most recent laboratory work. No further changes will be made at this time. Return in about 1 month (around 11/1/2020) for chronic conditions. Electronically Signed By: Noemy Webster CNP on 10/25/2020 16:16:23  ______________________________  EVY Morales/[id number]  D: 10/19/2020 20:31:25  T: 10/20/2020 00:18:14    cc: - [facility]

## 2020-10-28 ASSESSMENT — PATIENT HEALTH QUESTIONNAIRE - PHQ9
1. LITTLE INTEREST OR PLEASURE IN DOING THINGS: 0
SUM OF ALL RESPONSES TO PHQ QUESTIONS 1-9: 0
2. FEELING DOWN, DEPRESSED OR HOPELESS: 0
DEPRESSION UNABLE TO ASSESS: FUNCTIONAL CAPACITY MOTIVATION LIMITS ACCURACY
SUM OF ALL RESPONSES TO PHQ9 QUESTIONS 1 & 2: 0

## 2020-11-17 ENCOUNTER — OFFICE VISIT (OUTPATIENT)
Dept: GERIATRIC MEDICINE | Age: 85
End: 2020-11-17
Payer: MEDICARE

## 2020-11-17 PROCEDURE — G8484 FLU IMMUNIZE NO ADMIN: HCPCS | Performed by: NURSE PRACTITIONER

## 2020-11-17 PROCEDURE — 1123F ACP DISCUSS/DSCN MKR DOCD: CPT | Performed by: NURSE PRACTITIONER

## 2020-11-17 PROCEDURE — 99308 SBSQ NF CARE LOW MDM 20: CPT | Performed by: NURSE PRACTITIONER

## 2020-11-20 RX ORDER — TRAMADOL HYDROCHLORIDE 50 MG/1
50 TABLET ORAL 2 TIMES DAILY
Qty: 60 TABLET | Refills: 0 | Status: SHIPPED | OUTPATIENT
Start: 2020-11-20 | End: 2020-12-20

## 2020-11-25 LAB
ANION GAP SERPL CALCULATED.3IONS-SCNC: 12 MEQ/L (ref 9–15)
BUN BLDV-MCNC: 17 MG/DL (ref 8–23)
CALCIUM SERPL-MCNC: 9.6 MG/DL (ref 8.5–9.9)
CHLORIDE BLD-SCNC: 104 MEQ/L (ref 95–107)
CO2: 25 MEQ/L (ref 20–31)
CREAT SERPL-MCNC: 0.59 MG/DL (ref 0.5–0.9)
GFR AFRICAN AMERICAN: >60
GFR NON-AFRICAN AMERICAN: >60
GLUCOSE BLD-MCNC: 87 MG/DL (ref 70–99)
HCT VFR BLD CALC: 42.2 % (ref 37–47)
HEMOGLOBIN: 14.1 G/DL (ref 12–16)
MCH RBC QN AUTO: 33 PG (ref 27–31.3)
MCHC RBC AUTO-ENTMCNC: 33.5 % (ref 33–37)
MCV RBC AUTO: 98.5 FL (ref 82–100)
PDW BLD-RTO: 14.5 % (ref 11.5–14.5)
PLATELET # BLD: 174 K/UL (ref 130–400)
POTASSIUM SERPL-SCNC: 3.7 MEQ/L (ref 3.4–4.9)
RBC # BLD: 4.29 M/UL (ref 4.2–5.4)
SODIUM BLD-SCNC: 141 MEQ/L (ref 135–144)
WBC # BLD: 8.7 K/UL (ref 4.8–10.8)

## 2020-11-27 LAB
BILIRUBIN, URINE: NEGATIVE
BLOOD, URINE: NEGATIVE
CLARITY: CLEAR
COLOR: YELLOW
GLUCOSE URINE: NEGATIVE
KETONES, URINE: NEGATIVE
LEUKOCYTE ESTERASE, URINE: NORMAL
NITRITE, URINE: NEGATIVE
PH UA: 6 (ref 4.5–8)
PROTEIN UA: NEGATIVE
SPECIFIC GRAVITY, URINE: 1.01
UROBILINOGEN, URINE: NORMAL

## 2020-11-28 LAB
ANION GAP SERPL CALCULATED.3IONS-SCNC: 12 MEQ/L (ref 9–15)
BUN BLDV-MCNC: 19 MG/DL (ref 8–23)
CALCIUM SERPL-MCNC: 9.2 MG/DL (ref 8.5–9.9)
CHLORIDE BLD-SCNC: 109 MEQ/L (ref 95–107)
CO2: 26 MEQ/L (ref 20–31)
CREAT SERPL-MCNC: 0.92 MG/DL (ref 0.5–0.9)
D DIMER: 0.67 MG/L FEU (ref 0–0.5)
GFR AFRICAN AMERICAN: >60
GFR NON-AFRICAN AMERICAN: 56.8
GLUCOSE BLD-MCNC: 90 MG/DL (ref 70–99)
HCT VFR BLD CALC: 40.9 % (ref 37–47)
HEMOGLOBIN: 13.4 G/DL (ref 12–16)
INR BLD: 1.4
MCH RBC QN AUTO: 32.4 PG (ref 27–31.3)
MCHC RBC AUTO-ENTMCNC: 32.8 % (ref 33–37)
MCV RBC AUTO: 98.7 FL (ref 82–100)
PDW BLD-RTO: 14.7 % (ref 11.5–14.5)
PLATELET # BLD: 142 K/UL (ref 130–400)
POTASSIUM SERPL-SCNC: 3.7 MEQ/L (ref 3.4–4.9)
PROTHROMBIN TIME: 17.1 SEC (ref 12.3–14.9)
RBC # BLD: 4.14 M/UL (ref 4.2–5.4)
SODIUM BLD-SCNC: 147 MEQ/L (ref 135–144)
TROPONIN: 0.03 NG/ML (ref 0–0.01)
WBC # BLD: 5.1 K/UL (ref 4.8–10.8)

## 2020-11-30 ENCOUNTER — OFFICE VISIT (OUTPATIENT)
Dept: GERIATRIC MEDICINE | Age: 85
End: 2020-11-30
Payer: MEDICARE

## 2020-11-30 LAB
BUN BLDV-MCNC: 20 MG/DL
C-REACTIVE PROTEIN: 2.9
CALCIUM SERPL-MCNC: 8.7 MG/DL
CHLORIDE BLD-SCNC: 109 MMOL/L
CO2: 24 MMOL/L
CREAT SERPL-MCNC: 0.8 MG/DL
GFR CALCULATED: NORMAL
GLUCOSE BLD-MCNC: 103 MG/DL
POTASSIUM SERPL-SCNC: 3.8 MMOL/L
SODIUM BLD-SCNC: 146 MMOL/L
TROPONIN: <0.01 NG/ML (ref 0–0.01)

## 2020-11-30 PROCEDURE — 1123F ACP DISCUSS/DSCN MKR DOCD: CPT | Performed by: INTERNAL MEDICINE

## 2020-11-30 PROCEDURE — 99309 SBSQ NF CARE MODERATE MDM 30: CPT | Performed by: INTERNAL MEDICINE

## 2020-11-30 PROCEDURE — G8484 FLU IMMUNIZE NO ADMIN: HCPCS | Performed by: INTERNAL MEDICINE

## 2020-12-01 ENCOUNTER — VIRTUAL VISIT (OUTPATIENT)
Dept: GERIATRIC MEDICINE | Age: 85
End: 2020-12-01
Payer: MEDICARE

## 2020-12-01 PROCEDURE — 1123F ACP DISCUSS/DSCN MKR DOCD: CPT | Performed by: NURSE PRACTITIONER

## 2020-12-01 PROCEDURE — 99309 SBSQ NF CARE MODERATE MDM 30: CPT | Performed by: NURSE PRACTITIONER

## 2020-12-03 LAB
C-REACTIVE PROTEIN: 4.1
FERRITIN: 169 NG/ML (ref 9–150)

## 2020-12-04 ENCOUNTER — OFFICE VISIT (OUTPATIENT)
Dept: GERIATRIC MEDICINE | Age: 85
End: 2020-12-04
Payer: MEDICARE

## 2020-12-04 LAB
BUN BLDV-MCNC: 22 MG/DL
CALCIUM SERPL-MCNC: 9.5 MG/DL
CHLORIDE BLD-SCNC: 115 MMOL/L
CO2: 19 MMOL/L
CREAT SERPL-MCNC: 0.7 MG/DL
GFR CALCULATED: NORMAL
GLUCOSE BLD-MCNC: 109 MG/DL
POTASSIUM SERPL-SCNC: 3.9 MMOL/L
SODIUM BLD-SCNC: 148 MMOL/L

## 2020-12-04 PROCEDURE — 99309 SBSQ NF CARE MODERATE MDM 30: CPT | Performed by: NURSE PRACTITIONER

## 2020-12-04 PROCEDURE — 1123F ACP DISCUSS/DSCN MKR DOCD: CPT | Performed by: NURSE PRACTITIONER

## 2020-12-04 PROCEDURE — G8484 FLU IMMUNIZE NO ADMIN: HCPCS | Performed by: NURSE PRACTITIONER

## 2020-12-06 ENCOUNTER — OFFICE VISIT (OUTPATIENT)
Dept: GERIATRIC MEDICINE | Age: 85
End: 2020-12-06
Payer: MEDICARE

## 2020-12-06 PROCEDURE — 1123F ACP DISCUSS/DSCN MKR DOCD: CPT | Performed by: NURSE PRACTITIONER

## 2020-12-06 PROCEDURE — 99309 SBSQ NF CARE MODERATE MDM 30: CPT | Performed by: NURSE PRACTITIONER

## 2020-12-06 PROCEDURE — G8484 FLU IMMUNIZE NO ADMIN: HCPCS | Performed by: NURSE PRACTITIONER

## 2020-12-07 ENCOUNTER — OFFICE VISIT (OUTPATIENT)
Dept: GERIATRIC MEDICINE | Age: 85
End: 2020-12-07
Payer: MEDICARE

## 2020-12-07 LAB — TROPONIN: 0.04 NG/ML (ref 0–0.01)

## 2020-12-07 PROCEDURE — 1123F ACP DISCUSS/DSCN MKR DOCD: CPT | Performed by: INTERNAL MEDICINE

## 2020-12-07 PROCEDURE — G8484 FLU IMMUNIZE NO ADMIN: HCPCS | Performed by: INTERNAL MEDICINE

## 2020-12-07 PROCEDURE — 99308 SBSQ NF CARE LOW MDM 20: CPT | Performed by: INTERNAL MEDICINE

## 2020-12-08 ENCOUNTER — OFFICE VISIT (OUTPATIENT)
Dept: GERIATRIC MEDICINE | Age: 85
End: 2020-12-08
Payer: MEDICARE

## 2020-12-08 PROCEDURE — 99309 SBSQ NF CARE MODERATE MDM 30: CPT | Performed by: NURSE PRACTITIONER

## 2020-12-08 PROCEDURE — G8484 FLU IMMUNIZE NO ADMIN: HCPCS | Performed by: NURSE PRACTITIONER

## 2020-12-08 PROCEDURE — 1123F ACP DISCUSS/DSCN MKR DOCD: CPT | Performed by: NURSE PRACTITIONER

## 2020-12-09 LAB
ANION GAP SERPL CALCULATED.3IONS-SCNC: 21 MEQ/L (ref 9–15)
BUN BLDV-MCNC: 55 MG/DL (ref 8–23)
CALCIUM SERPL-MCNC: 8.9 MG/DL (ref 8.5–9.9)
CHLORIDE BLD-SCNC: 134 MEQ/L (ref 95–107)
CO2: 16 MEQ/L (ref 20–31)
CREAT SERPL-MCNC: 2.67 MG/DL (ref 0.5–0.9)
D DIMER: 2.19 MG/L FEU (ref 0–0.5)
GFR AFRICAN AMERICAN: 20.1
GFR NON-AFRICAN AMERICAN: 16.6
GLUCOSE BLD-MCNC: 134 MG/DL (ref 70–99)
HCT VFR BLD CALC: 46.7 % (ref 37–47)
HEMOGLOBIN: 14.9 G/DL (ref 12–16)
MCH RBC QN AUTO: 32.8 PG (ref 27–31.3)
MCHC RBC AUTO-ENTMCNC: 32 % (ref 33–37)
MCV RBC AUTO: 102.4 FL (ref 82–100)
PDW BLD-RTO: 16.6 % (ref 11.5–14.5)
PLATELET # BLD: 158 K/UL (ref 130–400)
POTASSIUM SERPL-SCNC: 3.9 MEQ/L (ref 3.4–4.9)
RBC # BLD: 4.56 M/UL (ref 4.2–5.4)
SODIUM BLD-SCNC: 171 MEQ/L (ref 135–144)
WBC # BLD: 10.7 K/UL (ref 4.8–10.8)

## 2020-12-17 PROBLEM — K29.70 GASTRITIS: Status: RESOLVED | Noted: 2018-03-21 | Resolved: 2020-12-17

## 2020-12-17 PROBLEM — L03.115 CELLULITIS OF RIGHT LEG: Status: RESOLVED | Noted: 2018-01-07 | Resolved: 2020-12-17

## 2020-12-17 PROBLEM — R50.9 FEVER: Status: RESOLVED | Noted: 2018-03-18 | Resolved: 2020-12-17

## 2020-12-17 NOTE — PROGRESS NOTES
Overview:  - 80 year old female who presents with PMH of HTN,AFIB(declined AC/aspirin),HLP presented to OSH yesterday evening with C/O right eye vision loss around 5 pm yesterday. Painless. - Had CT head without contrast at OSH ED that shows: No acute intracranial abnormality. 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 - EKG shows sinus bradycardia with PAC(R-58) - Concern for retinal artery occlusion?> 12 hours have passed. - Afib related? Carotid bruit? - BP was 210/85 initially and then came down to 160/69 -Normal CRP - No other acute neuro deficit seen. -Spoke to Optho on IPBG([id number])- alon Villalta and he will arrange for pt to be seen today at [facility]. No acute intervention needed as pt well out of time frame(as per optho). Plan: - Admit to [facility] consult. Await optho input and consider further rx options after etiology is confirmed. - Fall precautions - Neuro checks - ECHO - Carotid doppler  Will monitor.  Vitamin D deficiency      Past Surgical History:   Procedure Laterality Date    CT EGD TRANSORAL BIOPSY SINGLE/MULTIPLE N/A 3/21/2018    EGD, LUIS TEST performed [facility] Mendoza Benites [facility] [address] Right     TONSILLECTOMY      TOTAL HIP ARTHROPLASTY Right 2009     Family History   Problem Relation Age of Onset    Cancer Daughter         lung, smoker      Social History     Socioeconomic History    Marital status:       Spouse name: Not on file    Number of children: Not on file    Years of education: Not on file    Highest education level: Not on file   Occupational History    Not on file   Social Needs    Financial resource strain: Not on file    Food insecurity     Worry: Not on file     Inability: Not on file    Transportation needs     Medical: Not on file     Non-medical: Not on file   Tobacco Use    Smoking status: Never Smoker    Smokeless tobacco: Never Used   Substance and Sexual Activity  Alcohol use: Yes     Comment: rarely    Drug use: No    Sexual activity: Not on file   Lifestyle    Physical activity     Days per week: Not on file     Minutes per session: Not on file    Stress: Not on file   Relationships    Social connections     Talks on phone: Not on file     Gets together: Not on file     Attends [employer] service: Not on file     Active member of club or organization: Not on file     Attends meetings of clubs or organizations: Not on file     Relationship status: Not on file    Intimate partner violence     Fear of current or ex partner: Not on file     Emotionally abused: Not on file     Physically abused: Not on file     Forced sexual activity: Not on file   Other Topics Concern    Not on file   Social History Narrative    Not on file       Allergies: Cephalexin, Cipro xr, Doxycycline, Meprobamate, Penicillins, Relafen [nabumetone], and Nsaids  NF MEDICATIONS REVIEWED    ROS:  See HPI. Uses walker for locomotion in facility. Constitutional: There are no reports of behavioral issues,  fever, or increased weakness. No covid   Respiratory: baseline SOB, no dyspnea, cough   Cardiovascular: denies CP, lightheadedness,   Extremities: No reports of pain issues, no edema    Physical exam:   125.8 pounds   Blood pressure 106/58 temperature 97 pulse 58 respirations 18 O2 saturation 96% room air   constitutional: Awake and alert conversational sitting in chair in room  Flat  Extremities: trace ankle edema, sellar joints and hands noted to have bone remodeling with nodules no redness or swelling no pain to palpation  ASSESSMENT:     Diagnosis Orders   1. Primary osteoarthritis involving multiple joints     2. Asymptomatic varicose veins of both lower extremities         PLAN:   1. Pain under good control no recent flares  2. No issue , stable     Return in about 1 month (around 12/17/2020). Pertinent POC, labs, have been reviewed, continue same. Encourage fluids and good nutrition. Stress fall prevention strategies.     RAKESH Lopez-CNP      Mayda Renae DNP, MSN, RN, GNP-BC, NP-C  Adult/Karri Nurse Practitioner  [facility]  Department of Geriatrics  8:30am-4:30pm   [phone]  After hours Answering service [phone]

## 2020-12-27 NOTE — PROGRESS NOTES
2020    [address], [address]      TELEHEALTH EVALUATION -- Audio/Visual (During BKBCU-76 public health emergency)    HPI:    Lorenzo Caldwell (:  10/9/1926) has requested an audio/video evaluation for the following concern(s):      visit for CC of COVID-19. Pt/Nurse noted  they had c/o  Severe diarrhea. Is worsened by covid infection, relieved by nothing, she is not eating well. Weak, non productive cough. Review of Systems  ROS: Nurse/pt reports   Had  decrease in appetite, and no  fever, now weaker. Had no runny nose, sore throat, and  But has moist cough. No c/o loss of taste or smell,  Respiratory: Nurse listened to lungs, no c/o SOB, dyspnea, dyspnea on exertion, no  hypoxia  Cardiovascular: Nurse listened to heart sounds, no observed PND, orthopnea  GI: No Vomiting. : no reports of dysuria  Extremities: No reports of pain issues, no edema     Prior to Visit Medications    Medication Sig Taking?  Authorizing Provider   PARoxetine (PAXIL) 10 MG tablet Take 1 tablet by mouth daily  RAKESH Bagley CNP   albuterol (PROVENTIL) (2.5 MG/3ML) 0.083% nebulizer solution Take 3 mLs by nebulization 3 times daily for 5 days  RAKESH Washington CNP   acetaminophen (TYLENOL) 650 MG suppository Place 650 mg rectally every 4 hours as needed for Fever  Historical Provider, MD   pantoprazole (PROTONIX) 40 MG tablet Take 1 tablet by mouth every morning (before breakfast)  John Dominguez MD   vitamin C (ASCORBIC ACID) 500 MG tablet Take 500 mg by mouth daily  Historical Provider, MD   magnesium hydroxide (MILK OF MAGNESIA) 400 MG/5ML suspension Take 30 mLs by mouth daily as needed for Constipation  Historical Provider, MD   aluminum-magnesium hydroxide 200-200 MG/5ML suspension Take 30 mLs by mouth every 4 hours as needed for Indigestion  Historical Provider, MD artificial tears 0.1-0.3 % SOLN ophthalmic solution Place 2 drops into both eyes 3 times daily  RAKESH Longo CNP   acetaminophen (TYLENOL) 325 MG tablet Take 650 mg by mouth every 4 hours as needed for Pain  Historical Provider, MD   fluticasone (FLONASE) 50 MCG/ACT nasal spray 1 spray by Nasal route daily  Pooja Flowers MD   diltiazem (CARDIZEM) 60 MG tablet Take 1 tablet by mouth 3 times daily  João Bazan MD   metoprolol succinate (TOPROL XL) 25 MG extended release tablet TAKE 1 TABLET BY MOUTH ONCE DAILY  João Bazan MD   docusate sodium (COLACE, DULCOLAX) 100 MG CAPS Take 100 mg by mouth 2 times daily  Navya Banuelos MD   atorvastatin (LIPITOR) 40 MG tablet Take 1 tablet by mouth daily  RAKESH De La Garza CNP   rivaroxaban (XARELTO) 15 MG TABS tablet Take 1 tablet by mouth daily (with breakfast)  João Bazan MD   AMITIZA 8 MCG CAPS capsule TAKE 1 CAPSULE BY MOUTH ONCE DAILY  Pooja Flowers MD   fluocinonide (LIDEX) 0.05 % cream Apply topically 2 times daily as needed (itching)  [place], MD   Lactobacillus (PROBIOTIC ACIDOPHILUS) TABS Take 1 tablet by mouth 2 times daily  RAKESH De La Garza CNP   diltiazem (CARDIZEM CD) 180 MG ER capsule Take 1 capsule by mouth daily. RAKESH De La Garza CNP   Multiple Vitamin (MULTIVITAMIN PO) Take  by mouth daily. Historical Provider, MD       Social History     Tobacco Use    Smoking status: Never Smoker    Smokeless tobacco: Never Used   Substance Use Topics    Alcohol use: Yes     Comment: rarely    Drug use: No        Allergies   Allergen Reactions    Cephalexin     Cipro Xr     Doxycycline     Meprobamate     Penicillins     Relafen [Nabumetone]     Nsaids      Upset stomach at times.    ,   Past Medical History:   Diagnosis Date    Anxiety     Atrial fibrillation ([address]     Bilateral carotid artery stenosis 10/27/2017    Central retinal artery occlusion, right eye 10/17/2017    Chronic constipation  Chronic venous insufficiency     Congest[facility] 9/29/2019    Dyslipidemia     Essential hypertension     Gastroesophageal reflux disease 9/29/2019    HTN (hypertension) 6/29/2013    Neurodermatitis     chronic, of bilateral lower extremities    Osteoarthritis 7/22/2019    Osteoporosis     Pap test, as part of routine gynecological examination     years ago   [facility] [place] [facility]     about 5 yrs ago    Seronegative rheumatoid arthritis ([facility]     Stage 3 chronic kidney disease ([facility] 12/29/2019    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 Painless. - Had CT head without contrast at OSH ED that shows: No acute intracranial abnormality. 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 - EKG shows sinus bradycardia with PAC(R-58) - Concern for retinal artery occlusion?> 12 hours have passed. - Afib related? Carotid bruit? - BP was 210/85 initially and then came down to 160/69 -Normal CRP - No other acute neuro deficit seen. -Spoke to Optho on GURVINDER([id number])- alon Villalta and he will arrange for pt to be [facility] at [facility]. No acute intervention needed as pt well out of time frame(as per optho). Plan: - Admit to [facility] consult. Await optho input and consider further rx options after etiology is confirmed. - Fall precautions - Neuro checks - ECHO - Carotid doppler  Will monitor.     Vitamin D deficiency    ,   Past Surgical History:   Procedure Laterality Date    [place] EGD TRANSORAL BIOPSY SINGLE/MULTIPLE N/A 3/21/2018    EGD, LUIS TEST performed by Jace Saavedra MD at [address] Right    [facility] HIP ARTHROPLASTY Right 2009   ,   Social History     Tobacco Use 2.  Foods assistance set up or feed she is on oral med Pass already increase the supplement to 4 times a day, may need speech therapy evaluation  3. She has a nonproductive moist cough with some rhonchi she may need to have pneumonia rule out or CHF rule out  Her CRP is 2.9  We will consider differentials of aspiration pneumonia because of the severe weakness  PT OT rehab consult    Standing orders for COVID -19 labs on diagnosis AND q week:  CBC  BMP  LFT  LDH  FERRITIN  PROCALCITONIN  CRP  D-DIMER  TROPONIN    CXR ON DIAGNOSIS    medications:  Vit D3 1000 units po bid  VIt C 500mg po bid  Zinc 50mg po daily  Pepcid 20mg po daily IF NOT on PPI    Continue same meds and POC    Return if symptoms worsen or fail to improve. Dom Ricci is a 80 y.o. female being evaluated by a Virtual Visit (video visit) encounter to address concerns as mentioned above. A caregiver was present when appropriate. Due to this being a TeleHealth encounter (During KJMEZ-14 public health emergency), evaluation of the following organ systems was limited: Vitals/Constitutional/EENT/Resp/CV/GI//MS/Neuro/Skin/Heme-Lymph-Imm. Pursuant to the emergency declaration under the [facility], [address] [place] [address] authority and the [employer] Act, this Virtual Visit was conducted with patient's (and/or legal guardian's) consent, to reduce the patient's risk of exposure to COVID-19 and provide necessary medical care. The patient (and/or legal guardian) has also been advised to contact this office for worsening conditions or problems, and seek emergency medical treatment and/or call 911 if deemed necessary.      Patient identification was verified at the start of the visit: Yes    Total time spent on this encounter: Not billed by time Services were provided through a video synchronous discussion virtually to substitute for in-person clinic visit, and they agree to continue with assistance of NF staff and via VIRTUAL platform Patient and provider were located at their individual homes. --RAKESH Silver CNP on 12/26/2020 at 10:22 PM    An electronic signature was used to authenticate this note.

## 2020-12-27 NOTE — PROGRESS NOTES
[address], [address]      2020    HPI:    Low Dior (:  10/9/1926) has requested an evaluation for the following concern(s):      visit for CC of COVID-19. Pt/Nurse noted  they had c/o  dehydration. Is worsened by covid infection, relieved by fluids, hypodrmaclysis. Review of Systems  ROS: Nurse/pt reports   Constitutional: There are  no reports of behavioral issues, but she is somewhat hypokinetic  Had  decrease in appetite, no low grade fever, is now weaker. Had  No runny nose, sore throat, and no  cough. no c/o loss of taste or smell, but continues to have poor p.o. intake is to be weak  Respiratory:no SOB, dyspnea, dyspnea on exertion  Cardiovascular: no observed PND, orthopnea,   GI: No N/V/D. : no reports of dysuria  Extremities: No  reports of pain issues, edema     Prior to Visit Medications    Medication Sig Taking?  Authorizing Provider   PARoxetine (PAXIL) 10 MG tablet Take 1 tablet by mouth daily  RAKESH Watts CNP   albuterol (PROVENTIL) (2.5 MG/3ML) 0.083% nebulizer solution Take 3 mLs by nebulization 3 times daily for 5 days  RAKESH Vazquez CNP   acetaminophen (TYLENOL) 650 MG suppository Place 650 mg rectally every 4 hours as needed for Fever  Historical Provider, MD   pantoprazole (PROTONIX) 40 MG tablet Take 1 tablet by mouth every morning (before breakfast)  Garima White MD   vitamin C (ASCORBIC ACID) 500 MG tablet Take 500 mg by mouth daily  Historical Provider, MD   magnesium hydroxide (MILK OF MAGNESIA) 400 MG/5ML suspension Take 30 mLs by mouth daily as needed for Constipation  Historical Provider, MD   aluminum-magnesium hydroxide 200-200 MG/5ML suspension Take 30 mLs by mouth every 4 hours as needed for Indigestion  Historical Provider, MD   artificial tears 0.1-0.3 % SOLN ophthalmic solution Place 2 drops into both eyes 3 times daily  RAKESH Vazquez CNP acetaminophen (TYLENOL) 325 MG tablet Take 650 mg by mouth every 4 hours as needed for Pain  Historical Provider, MD   fluticasone (FLONASE) 50 MCG/ACT nasal spray 1 spray by Nasal route daily  Pooja Flowers MD   diltiazem (CARDIZEM) 60 MG tablet Take 1 tablet by mouth 3 times daily  Chloé Castro MD   metoprolol succinate (TOPROL XL) 25 MG extended release tablet TAKE 1 TABLET BY MOUTH ONCE DAILY  Chloé Castro MD   docusate sodium (COLACE, DULCOLAX) 100 MG CAPS Take 100 mg by mouth 2 times daily  Jeanna Oppenheim, MD   atorvastatin (LIPITOR) 40 MG tablet Take 1 tablet by mouth daily  RAKESH Guzman CNP   rivaroxaban (XARELTO) 15 MG TABS tablet Take 1 tablet by mouth daily (with breakfast)  Chloé Castro MD   AMITIZA 8 MCG CAPS capsule TAKE 1 CAPSULE BY MOUTH ONCE DAILY  Pooja Flowers MD   fluocinonide (LIDEX) 0.05 % cream Apply topically 2 times daily as needed (itching)  [place], MD   Lactobacillus (PROBIOTIC ACIDOPHILUS) TABS Take 1 tablet by mouth 2 times daily  RAKESH Guzman CNP   diltiazem (CARDIZEM CD) 180 MG ER capsule Take 1 capsule by mouth daily. RAKESH Guzman CNP   Multiple Vitamin (MULTIVITAMIN PO) Take  by mouth daily. Historical Provider, MD       Social History     Tobacco Use    Smoking status: Never Smoker    Smokeless tobacco: Never Used   Substance Use Topics    Alcohol use: Yes     Comment: rarely    Drug use: No        Allergies   Allergen Reactions    Cephalexin     Cipro Xr     Doxycycline     Meprobamate     Penicillins     Relafen [Nabumetone]     Nsaids      Upset stomach at times.    ,   Past Medical History:   Diagnosis Date    Anxiety     [facility]     Bilateral carotid artery stenosis 10/27/2017    Central retinal artery occlusion, right eye 10/17/2017    Chronic constipation     Chronic venous insufficiency     [facility] 9/29/2019    Dyslipidemia     Essential hypertension  Gastroesophageal reflux disease 9/29/2019    HTN (hypertension) 6/29/2013    Neurodermatitis     chronic, of bilateral lower extremities    Osteoarthritis 7/22/2019    Osteoporosis     Pap test, as part of routine gynecological examination     years ago   [facility]     about 5 yrs ago    [facility]onegative rheumatoid arthritis [facility]     Stage 3 chronic kidney disease ([facility] 12/29/2019    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 Painless. - Had CT head without contrast at OSH ED that shows: No acute intracranial abnormality. 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 - EKG shows sinus bradycardia with PAC(R-58) - Concern for retinal artery occlusion?> 12 hours have passed. - Afib related? Carotid bruit? - BP was 210/85 initially and then came down to 160/69 -Normal CRP - No other acute neuro deficit seen. -Spoke to Optho on AYBD([id number])- alon Villalta and he will arrange for pt to be seen today at [facility]. No acute intervention needed as pt well out of time frame(as per optho). Plan: - Admit to [facility] consult. Await optho input and consider further rx options after etiology is confirmed. - Fall precautions - Neuro checks - ECHO - Carotid doppler  Will monitor.     Vitamin D deficiency    ,   Past Surgical History:   Procedure Laterality Date    [place] EGD TRANSORAL BIOPSY SINGLE/MULTIPLE N/A 3/21/2018    EGD, LUIS TEST performed by Tracee Brasher MD [address] Right     TONSILLECTOMY      TOTAL HIP ARTHROPLASTY Right 2009   ,   Social History     Tobacco Use    Smoking status: Never Smoker    Smokeless tobacco: Never Used   Substance Use Topics    Alcohol use: Yes     Comment: rarely  Drug use: No     BUN improved to 22  Creatinine improved at 0.7    PHYSICAL EXAMINATION:  Vs  Blood pressure 94/65 temp 96.5 pulse 86 respirations 18 weight 110 pounds O2 saturation 98%    Constitutional: appears tired  [x] No apparent distress      [x] Abnormal- THIN , FRAIL,   Mental status  [x] awake   [x]Able to follow commands     CONFUSED AT BASELINE  Mildly Lethargic   Speech is  clear, and appropriate   sparse,   Eyes:  EOM    [x]  Normal  [] Abnormal-  Sclera  [x]  Normal  [] Abnormal -         Discharge [x]  None visible  [] Abnormal -    HENT:   [x] Normocephalic, atraumatic. [] Abnormal   [x] Mouth/Throat: Mucous membranes are moist.     External Ears [x] Normal  [] Abnormal-     Neck: [x] No visualized mass     Pulmonary/Chest:   Heart sounds    RRR  Lungs  sounds    clear   [x] Respiratory effort normal.  [x] No visualized signs of difficulty breathing or respiratory distress           Musculoskeletal:       [x] Normal range of motion of neck        [x] Abnormal-   LIMITED ROM too weak to walk    Neurological:        [x] No Facial Asymmetry (Cranial nerve 7 motor function) (limited exam to video visit)          [x] No gaze palsy             Skin:        [x] No significant exanthematous lesions or discoloration noted on facial skin                 Psychiatric:           [x] Abnormal- flat       Other pertinent observable physical exam findings-     ASSESSMENT/PLAN:   Diagnosis Orders   1. Encephalopathy due to COVID-19 virus     2. Dehydration       1. Is more hypokinetic her blood pressure is little bit low she is receiving fluids to be getting weaker  2.   Dehydration is actually improved but we will continue fluids based on her symptoms not on her BUN    Standing orders for COVID -19 labs on diagnosis AND q week:  CBC  BMP  LFT  LDH  FERRITIN  PROCALCITONIN  CRP  D-DIMER  TROPONIN  CPK  PT/INR    CXR ON DIAGNOSIS    medications:  Vit D3 1000 units po bid  VIt C 500mg po bid  Zinc 50mg po daily Pepcid 20mg po daily IF NOT on PPI    Continue same meds and POC    Return if symptoms worsen or fail to improve. --Amado Hope, RAKESH - CNP on 12/27/2020 at 5:26 PM    An electronic signature was used to authenticate this note.

## 2020-12-28 NOTE — PROGRESS NOTES
Subjective:     CC: COVID-19    HPI:  Vinnie Jha is a 80 y.o. female was seen today for COVID 19 evaluation. Patient has been COVID 19 positive since 11/25. They were seen with full PPE and observing continued droplet precautions. Condition discussed with nursing staff and treatment reviewed. Recent bloodwork, chest x-ray and vital signs reviewed. Fever curve and oxygen demands reviewed. Nutritional status and intake reviewed. Patient is demonstrating increased respiratory complaints at this time. Patient has not been febrile in the last 24 hours. Patient is not able to feed themselves.     Past Medical History:   Diagnosis Date    Anxiety     Atrial fibrillation ([address]     Bilateral carotid artery stenosis 10/27/2017    Central retinal artery occlusion, right eye 10/17/2017    Chronic constipation     Chronic venous insufficiency     Congestive heart failure ([address] 9/29/2019    Dyslipidemia     Essential hypertension     Gastroesophageal reflux disease 9/29/2019    HTN (hypertension) 6/29/2013    Neurodermatitis     chronic, of bilateral lower extremities    Osteoarthritis 7/22/2019    Osteoporosis     Pap test, as part of routine gynecological examination     years ago    Screening mammogram     about 5 yrs ago    Seronegative rheumatoid arthritis ([address]     Stage 3 chronic kidney disease ([address] 12/29/2019    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 Painless. - Had CT head without contrast at OSH ED that shows: No acute intracranial abnormality. 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 - EKG shows sinus bradycardia with PAC(R-58) - Concern for retinal artery occlusion?> 12 hours have passed. - Afib related? Carotid bruit? - BP was 210/85 initially and then came down to 160/69 -Normal CRP - No other acute neuro deficit seen. -Spoke to Optho on YVLY([id number])- alon Villalta and he will arrange for pt to be [facility]. No acute intervention needed as pt well out of time frame(as per optho). Plan: - Admit to [facility] consult. Await optho input and consider further rx options after etiology is confirmed. - Fall precautions - Neuro checks - ECHO - Carotid doppler  Will monitor.  Vitamin D deficiency      Past Surgical History:   Procedure Laterality Date    [place] EGD TRANSORAL BIOPSY SINGLE/MULTIPLE N/A 3/21/2018    EGD, LUIS TEST performed by Grant Daily MD at [address] [place] [address] Right     TONSILLECTOMY      TOTAL HIP ARTHROPLASTY Right 2009     Family History   Problem Relation Age of Onset    Cancer Daughter         lung, smoker      Social History     Socioeconomic History    Marital status:       Spouse name: Not on file    Number of children: Not on file    Years of education: Not on file    Highest education level: Not on file   Occupational History    Not on file   Social Needs    Financial resource strain: Not on file    Food insecurity     Worry: Not on file     Inability: Not on file    Transportation needs     Medical: Not on file     Non-medical: Not on file   Tobacco Use    Smoking status: Never Smoker    Smokeless tobacco: Never Used   Substance and Sexual Activity  Alcohol use: Yes     Comment: rarely    Drug use: No    Sexual activity: Not on file   Lifestyle    Physical activity     Days per week: Not on file     Minutes per session: Not on file    Stress: Not on file   Relationships    Social connections     Talks on phone: Not on file     Gets together: Not on file     Attends Che[employer] service: Not on file     Active member of club or organization: Not on file     Attends meetings of clubs or organizations: Not on file     Relationship status: Not on file    Intimate partner violence     Fear of current or ex partner: Not on file     Emotionally abused: Not on file     Physically abused: Not on file     Forced sexual activity: Not on file   Other Topics Concern    Not on file   Social History Narrative    Not on file     Current Outpatient Medications on File Prior to Visit   Medication Sig Dispense Refill    PARoxetine (PAXIL) 10 MG tablet Take 1 tablet by mouth daily 30 tablet 3    albuterol (PROVENTIL) (2.5 MG/3ML) 0.083% nebulizer solution Take 3 mLs by nebulization 3 times daily for 5 days 120 each 3    acetaminophen (TYLENOL) 650 MG suppository Place 650 mg rectally every 4 hours as needed for Fever      pantoprazole (PROTONIX) 40 MG tablet Take 1 tablet by mouth every morning (before breakfast) 30 tablet 3    vitamin C (ASCORBIC ACID) 500 MG tablet Take 500 mg by mouth daily      magnesium hydroxide (MILK OF MAGNESIA) 400 MG/5ML suspension Take 30 mLs by mouth daily as needed for Constipation      aluminum-magnesium hydroxide 200-200 MG/5ML suspension Take 30 mLs by mouth every 4 hours as needed for Indigestion      artificial tears 0.1-0.3 % SOLN ophthalmic solution Place 2 drops into both eyes 3 times daily 1 Bottle 0    acetaminophen (TYLENOL) 325 MG tablet Take 650 mg by mouth every 4 hours as needed for Pain      fluticasone (FLONASE) 50 MCG/ACT nasal spray 1 spray by Nasal route daily 16 g 11 .4 (H) 12/09/2020     12/09/2020     Lab Results   Component Value Date     12/09/2020    K 3.9 12/09/2020    K 3.4 03/21/2018     12/09/2020    CO2 16 12/09/2020    BUN 55 12/09/2020    CREATININE 2.67 12/09/2020    GLUCOSE 134 12/09/2020    CALCIUM 8.9 12/09/2020      Lab Results   Component Value Date    CKTOTAL 144 04/30/2013    TROPONINI 0.045 (HH) 12/07/2020     Lab Results   Component Value Date    CRP 4.1 12/03/2020      Lab Results   Component Value Date    DDIMER 2.19 ([facility]) 12/09/2020      Lab Results   Component Value Date    CKTOTAL 144 04/30/2013       No results found. Esther cline      Assessment & Plan:     Continue with current regimen of  Vitamin C 500mg PO BID  Vitamin D 1000 IU PO QD  Zinc 50 mg PO QD  Lovenox 30mg SQ BID    Patient does require Dexamethasone 6mg PO QD  Patient does  require antibiotics for concomitant bacterial pneumonia. Continue to monitor blood work:  Weekly D-Dimer, LDH, CRP, CPK, Procalcitonin, Ferritin, Troponin, CBC, BMP. Patient's case discussed with nursing staff and Code Status reviewed.     Delio Joiner MD

## 2020-12-28 NOTE — PROGRESS NOTES
[address], [address]      2020    HPI:    Low Dior (:  10/9/1926) has requested an evaluation for the following concern(s):      visit for CC of COVID-19. Pt has recovered form COVID infection. Has moved off unit to her room  Nursing states she is doing much better she is eating and drinking she is making no complaints    Review of Systems  ROS: Nurse/pt reports   Constitutional: There are no reports of behavioral issues,   Reports good appetite eating good drinking good  No further fever low-grade temp  No complaints of loss of taste or smell had    Respiratory: no SOB, dyspnea, dyspnea on exertion  Cardiovascular: denies CP, lightheadedness, palpitations, PND, orthopnea. GI: No N/V/D. : no reports of dysuria  Extremities: No reports of pain issues, no edema   But patient is not gotten out of bed since she has been back and she had chronic lower extremity edema and now there is no edema    Prior to Visit Medications    Medication Sig Taking?  Authorizing Provider   PARoxetine (PAXIL) 10 MG tablet Take 1 tablet by mouth daily  RAKESH Watts CNP   albuterol (PROVENTIL) (2.5 MG/3ML) 0.083% nebulizer solution Take 3 mLs by nebulization 3 times daily for 5 days  RAKESH Vazquez CNP   acetaminophen (TYLENOL) 650 MG suppository Place 650 mg rectally every 4 hours as needed for Fever  Historical Provider, MD   pantoprazole (PROTONIX) 40 MG tablet Take 1 tablet by mouth every morning (before breakfast)  Garima White MD   vitamin C (ASCORBIC ACID) 500 MG tablet Take 500 mg by mouth daily  Historical Provider, MD   magnesium hydroxide (MILK OF MAGNESIA) 400 MG/5ML suspension Take 30 mLs by mouth daily as needed for Constipation  Historical Provider, MD   aluminum-magnesium hydroxide 200-200 MG/5ML suspension Take 30 mLs by mouth every 4 hours as needed for Indigestion  Historical Provider, MD  Chronic venous insufficiency     Congestive heart failure ([facility] [address] 9/29/2019    Dyslipidemia     Essential hypertension     Gastroesophageal reflux disease 9/29/2019    HTN (hypertension) 6/29/2013    Neurodermatitis     chronic, of bilateral lower extremities    Osteoarthritis 7/22/2019    Osteoporosis     Pap test, as part of routine gynecological examination     years ago   [de-identified] Screening mammogram     about 5 yrs ago    Seronegative rheumatoid arthritis ([facility] [address]     Stage 3 chronic kidney disease ([facility] [address] 12/29/2019    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 Painless. - Had CT head without contrast at OSH ED that shows: No acute intracranial abnormality. 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 - EKG shows sinus bradycardia with PAC(R-58) - Concern for retinal artery occlusion?> 12 hours have passed. - Afib related? Carotid bruit? - BP was 210/85 initially and then came down to 160/69 -Normal CRP - No other acute neuro deficit seen. -Spoke to Optho on KEOA([id number])- alon Villalta and he will arrange for pt to be seen today at [facility]. No acute intervention needed as pt well out of time frame(as per optho). [facility]: - [facility] Await optho input and consider further rx options after etiology is confirmed. - Fall precautions - Neuro checks - ECHO - Carotid doppler  Will monitor.     Vitamin D deficiency    ,   Past Surgical History:   Procedure Laterality Date    [place] EGD TRANSORAL BIOPSY SINGLE/MULTIPLE N/A 3/21/2018    EGD, LUIS TEST performed by Maikel Canales MD at [facility] [id number]    [facility] HIP ARTHROPLASTY Right 2009   ,   Social History     Tobacco Use  Smoking status: Never Smoker    Smokeless tobacco: Never Used   Substance Use Topics    Alcohol use: Yes     Comment: rarely    Drug use: No       PHYSICAL EXAMINATION:  Temp 98.1 pulse 80 respirations 18 blood pressure 135/88 weight is 110 pounds O2 sat 97% on room air    Constitutional: looks fatigued, hard to arouse  [x] Abnormal- THIN , FRAIL, CACHEXIA NOTED  Mental status  [x]   Awake but sleepy  [x]not Able to follow commands      Lethargic vs sleepy? Speech is sparse, and not fluent    Eyes:  EOM    [x]  Normal  [] Abnormal-  Sclera  [x]  Normal  [] Abnormal -         Discharge [x]  None visible  [] Abnormal -    HENT:   [x] Normocephalic, atraumatic. [] Abnormal   [x] Mouth/Throat: Mucous membranes are DRY     External Ears [x] Normal  [] Abnormal-     Neck: [x] No visualized mass     Pulmonary/Chest:   Heart sounds    reg  Lungs  sounds  diminished   [] Respiratory effort normal.  [x] No visualized signs of difficulty breathing or respiratory distress            Musculoskeletal:         [x] Normal range of motion of neck        [x] Abnormal-   LIMITED ROM below BASELINE ,generally weak    Neurological:        [x] No Facial Asymmetry (Cranial nerve 7 motor function) (limited exam to video visit)          [x] No gaze palsy           Skin:        [x] No significant exanthematous lesions or discoloration noted on facial skin                Psychiatric:           [x] Abnormal- very flat         Other pertinent observable physical exam findings-     ASSESSMENT/PLAN:   Diagnosis Orders   1. COVID-19     2. Dehydration     1.   Recovered from Covid she is on the regular floor now out of the unit however she does not look at  It appears to be weak despite reports that she is eating and drinking better less interactive is difficult to keep her awake to talk to which is very unlike her  Been in bed will do PT OT ST  2.  Start fluids BMP in morning and will follow BUN Standing orders for COVID -19 recovery  q week:  CBC  BMP  STOP  medications:  Vit D3 1000 units po bid  VIt C 500mg po bid  Zinc 50mg po daily  Pepcid 20mg po daily IF NOT on PPI    Return if symptoms worsen or fail to improve. --RAKESH Simmons - CNP on 12/27/2020 at 9:53 PM    An electronic signature was used to authenticate this note.

## 2020-12-31 VITALS
RESPIRATION RATE: 18 BRPM | DIASTOLIC BLOOD PRESSURE: 60 MMHG | OXYGEN SATURATION: 94 % | TEMPERATURE: 98 F | SYSTOLIC BLOOD PRESSURE: 98 MMHG | HEART RATE: 68 BPM

## 2020-12-31 NOTE — PROGRESS NOTES
[facility]  [address] [place] [address], [address]    12/8/2020    Lata Menchaca  is a 80 y.o. in the [facility] being seen for a f/u of   Chief Complaint   Patient presents with    Positive For Covid-19     f/u recovery       HPI recovering from covid and off unit BUT  Looks toxic, is lethargic not waking up  Cannot answer questions  Nurse states is eating and drinking but looks dry    They have had no recent falls with injuries. They are not complaining of any un addressed pain issues. Have had no recent choking or dysphagia issues. NO agitation or unusual mental behavioral issues.      Past Medical History:   Diagnosis Date    Anxiety     Atrial fibrillation ([address]     Bilateral carotid artery stenosis 10/27/2017    Central retinal artery occlusion, right eye 10/17/2017    Chronic constipation     Chronic venous insufficiency     Congestive heart failure ([address] 9/29/2019    Dyslipidemia     Essential hypertension     Gastroesophageal reflux disease 9/29/2019    HTN (hypertension) 6/29/2013    Neurodermatitis     chronic, of bilateral lower extremities    Osteoarthritis 7/22/2019    Osteoporosis     Pap test, as part of routine gynecological examination     years ago    Screening mammogram     about 5 yrs ago    Seronegative rheumatoid arthritis ([address]     Stage 3 chronic kidney disease ([address] 12/29/2019    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 Painless. - Had CT head without contrast at OSH ED that shows: No acute intracranial abnormality. 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 - EKG shows sinus bradycardia with PAC(R-58) - Concern for retinal artery occlusion?> 12 hours have passed. - Afib related? Carotid bruit? - BP was 210/85 initially and then came down to 160/69 -Normal CRP - No other acute neuro deficit seen. -Spoke to Optho on WASE([id number])- alon Villalta and he will arrange for pt to be seen today at [facility]. No acute intervention needed as pt well out of time frame(as per optho). Plan: - Admit to [facility] consult. Await optho input and consider further rx options after etiology is confirmed. - Fall precautions - Neuro checks - ECHO - Carotid doppler  Will monitor.  Vitamin D deficiency      Past Surgical History:   Procedure Laterality Date    [place] EGD TRANSORAL BIOPSY SINGLE/MULTIPLE N/A 3/21/2018    EGD, LUIS TEST performed by Wayne Jama MD at [facility] [address]     TONSILLECTOMY      TOTAL HIP ARTHROPLASTY Right 2009     Family History   Problem Relation Age of Onset    Cancer Daughter         lung, smoker      Social History     Socioeconomic History    Marital status:       Spouse name: Not on file    Number of children: Not on file    Years of education: Not on file    Highest education level: Not on file   Occupational History    Not on file   Social Needs    Financial resource strain: Not on file    Food insecurity     Worry: Not on file     Inability: Not on file    Transportation needs     Medical: Not on file     Non-medical: Not on file   Tobacco Use    Smoking status: Never Smoker    Smokeless tobacco: Never Used   Substance and Sexual Activity

## 2021-03-09 NOTE — PROGRESS NOTES
Hospitalist Progress Note      PCP: Joesph Dooley CNP    Date of Admission: 2/2/2018    Chief Complaint: swallow problem     Subjective: pt in bed, eating breakfast, complaining on food stuck in her throat     Medications:  Reviewed    Infusion Medications    Scheduled Medications    ciprofloxacin  500 mg Oral 2 times per day    rivaroxaban  15 mg Oral Daily    metoprolol succinate  25 mg Oral Daily    sodium chloride flush  10 mL Intravenous 2 times per day    atorvastatin  40 mg Oral Daily    cyclobenzaprine  5 mg Oral TID    diltiazem  60 mg Oral TID    docusate sodium  100 mg Oral BID    lidocaine  1 patch Transdermal Daily    pantoprazole  40 mg Oral QAM AC    traMADol  50 mg Oral 4 times per day     PRN Meds: acetaminophen, magnesium hydroxide, ondansetron, oxyCODONE **OR** oxyCODONE, sodium chloride flush      Intake/Output Summary (Last 24 hours) at 02/13/18 0850  Last data filed at 02/12/18 2210   Gross per 24 hour   Intake              740 ml   Output             1150 ml   Net             -410 ml       Exam:    BP (!) 131/48   Pulse 73   Temp 98.1 °F (36.7 °C) (Oral)   Resp 17   Ht 4' 10\" (1.473 m)   Wt 106 lb 14.8 oz (48.5 kg)   SpO2 98%   BMI 22.35 kg/m²     General appearance: No apparent distress, appears stated age and cooperative. HEENT: Pupils equal, round, and reactive to light. Conjunctivae/corneas clear. Neck: Supple, with full range of motion. No jugular venous distention. Trachea midline. Respiratory:  Normal respiratory effort. Clear to auscultation, bilaterally without Rales/Wheezes/Rhonchi. Cardiovascular: Regular rate and rhythm with normal S1/S2 without murmurs, rubs or gallops. Abdomen: Soft, non-tender, non-distended with normal bowel sounds. Musculoskeletal: R arm in splint  Skin: Skin color, texture, turgor normal.  No rashes or lesions. Neurologic:  Neurovascularly intact without any focal sensory/motor deficits.  Cranial nerves: II-XII intact, grossly 74.8

## (undated) DEVICE — GLOVE SURG SZ 85 STD WHT LTX SYN POLYMER BEAD REINF ANTI RL

## (undated) DEVICE — ENDO CARRY-ON PROCEDURE KIT: Brand: ENDO CARRY-ON PROCEDURE KIT

## (undated) DEVICE — FORCEPS BX L240CM JAW DIA2.4MM ORNG L CAP W/ NDL DISP RAD

## (undated) DEVICE — SONY PRINTER PAPER

## (undated) DEVICE — TUBE SET 96 MM 64 MM H2O PERISTALTIC STD AUX CHANNEL

## (undated) DEVICE — ADAPTER FLSH PMP FLD MGMT GI IRRIG OFP 2 DISPOSABLE

## (undated) DEVICE — Device: Brand: ENDO SMARTCAP

## (undated) DEVICE — LUBRICANT SURG JELLY ST BACTER TUBE 4.25OZ

## (undated) DEVICE — CONMED SCOPE SAVER BITE BLOCK, 20X27 MM: Brand: SCOPE SAVER